# Patient Record
Sex: FEMALE | Race: OTHER | Employment: FULL TIME | URBAN - METROPOLITAN AREA
[De-identification: names, ages, dates, MRNs, and addresses within clinical notes are randomized per-mention and may not be internally consistent; named-entity substitution may affect disease eponyms.]

---

## 2017-02-03 RX ORDER — PAROXETINE 10 MG/1
TABLET, FILM COATED ORAL
Qty: 90 TABLET | Refills: 1 | Status: SHIPPED | OUTPATIENT
Start: 2017-02-03 | End: 2017-02-06 | Stop reason: SDUPTHER

## 2017-02-03 RX ORDER — TRAZODONE HYDROCHLORIDE 50 MG/1
TABLET ORAL
Qty: 90 TABLET | Refills: 1 | Status: SHIPPED | OUTPATIENT
Start: 2017-02-03 | End: 2017-02-06 | Stop reason: SDUPTHER

## 2017-02-06 ENCOUNTER — TELEPHONE (OUTPATIENT)
Dept: FAMILY MEDICINE CLINIC | Age: 52
End: 2017-02-06

## 2017-02-06 RX ORDER — TRAZODONE HYDROCHLORIDE 50 MG/1
TABLET ORAL
Qty: 90 TABLET | Refills: 1 | Status: SHIPPED | OUTPATIENT
Start: 2017-02-06 | End: 2017-05-04 | Stop reason: SDUPTHER

## 2017-02-06 RX ORDER — PAROXETINE 10 MG/1
TABLET, FILM COATED ORAL
Qty: 90 TABLET | Refills: 1 | Status: SHIPPED | OUTPATIENT
Start: 2017-02-06 | End: 2017-04-25 | Stop reason: ALTCHOICE

## 2017-03-13 ENCOUNTER — OFFICE VISIT (OUTPATIENT)
Dept: FAMILY MEDICINE CLINIC | Age: 52
End: 2017-03-13

## 2017-03-13 VITALS
RESPIRATION RATE: 16 BRPM | HEIGHT: 59 IN | DIASTOLIC BLOOD PRESSURE: 64 MMHG | HEART RATE: 61 BPM | WEIGHT: 140.2 LBS | BODY MASS INDEX: 28.26 KG/M2 | TEMPERATURE: 97.5 F | OXYGEN SATURATION: 98 % | SYSTOLIC BLOOD PRESSURE: 112 MMHG

## 2017-03-13 DIAGNOSIS — E04.9 GOITER: ICD-10-CM

## 2017-03-13 DIAGNOSIS — R53.83 FATIGUE, UNSPECIFIED TYPE: ICD-10-CM

## 2017-03-13 DIAGNOSIS — R53.83 FATIGUE, UNSPECIFIED TYPE: Primary | ICD-10-CM

## 2017-03-13 DIAGNOSIS — R06.02 SOB (SHORTNESS OF BREATH) ON EXERTION: ICD-10-CM

## 2017-03-13 LAB
ALBUMIN SERPL-MCNC: 4.2 G/DL (ref 3.9–4.9)
ALP BLD-CCNC: 74 U/L (ref 40–130)
ALT SERPL-CCNC: 13 U/L (ref 0–33)
ANION GAP SERPL CALCULATED.3IONS-SCNC: 9 MEQ/L (ref 7–13)
AST SERPL-CCNC: 16 U/L (ref 0–35)
ATYPICAL LYMPHOCYTE RELATIVE PERCENT: 9 %
BANDED NEUTROPHILS RELATIVE PERCENT: 3 % (ref 5–11)
BASOPHILS ABSOLUTE: 0 K/UL (ref 0–0.2)
BASOPHILS RELATIVE PERCENT: 0 %
BILIRUB SERPL-MCNC: 0.2 MG/DL (ref 0–1.2)
BUN BLDV-MCNC: 11 MG/DL (ref 6–20)
CALCIUM SERPL-MCNC: 9.3 MG/DL (ref 8.6–10.2)
CHLORIDE BLD-SCNC: 102 MEQ/L (ref 98–107)
CO2: 27 MEQ/L (ref 22–29)
CREAT SERPL-MCNC: 0.47 MG/DL (ref 0.5–0.9)
EOSINOPHILS ABSOLUTE: 0.1 K/UL (ref 0–0.7)
EOSINOPHILS RELATIVE PERCENT: 1 %
FERRITIN: 76.8 NG/ML (ref 13–150)
FOLATE: >20 NG/ML (ref 7.3–26.1)
GFR AFRICAN AMERICAN: >60
GFR NON-AFRICAN AMERICAN: >60
GLOBULIN: 2.1 G/DL (ref 2.3–3.5)
GLUCOSE BLD-MCNC: 80 MG/DL (ref 74–109)
HCT VFR BLD CALC: 40.7 % (ref 37–47)
HEMOGLOBIN: 13.4 G/DL (ref 12–16)
IRON SATURATION: 41 % (ref 11–46)
IRON: 114 UG/DL (ref 37–145)
LYMPHOCYTES ABSOLUTE: 1.8 K/UL (ref 1–4.8)
LYMPHOCYTES RELATIVE PERCENT: 13 %
MCH RBC QN AUTO: 29.3 PG (ref 27–31.3)
MCHC RBC AUTO-ENTMCNC: 32.8 % (ref 33–37)
MCV RBC AUTO: 89.2 FL (ref 82–100)
MONOCYTES ABSOLUTE: 0.8 K/UL (ref 0.2–0.8)
MONOCYTES RELATIVE PERCENT: 9 %
NEUTROPHILS ABSOLUTE: 5.7 K/UL (ref 1.4–6.5)
NEUTROPHILS RELATIVE PERCENT: 65 %
PDW BLD-RTO: 13.3 % (ref 11.5–14.5)
PLATELET # BLD: 185 K/UL (ref 130–400)
PLATELET SLIDE REVIEW: NORMAL
POTASSIUM SERPL-SCNC: 3.9 MEQ/L (ref 3.5–5.1)
RBC # BLD: 4.56 M/UL (ref 4.2–5.4)
RBC # BLD: NORMAL 10*6/UL
SMUDGE CELLS: 2.8
SODIUM BLD-SCNC: 138 MEQ/L (ref 132–144)
T3 FREE: 2.6 PG/ML (ref 2–4.4)
T4 FREE: 1.31 NG/DL (ref 0.93–1.7)
TOTAL IRON BINDING CAPACITY: 280 UG/DL (ref 178–450)
TOTAL PROTEIN: 6.3 G/DL (ref 6.4–8.1)
TSH SERPL DL<=0.05 MIU/L-ACNC: 1.81 UIU/ML (ref 0.27–4.2)
VITAMIN B-12: 462 PG/ML (ref 211–946)
WBC # BLD: 8.4 K/UL (ref 4.8–10.8)

## 2017-03-13 PROCEDURE — 99213 OFFICE O/P EST LOW 20 MIN: CPT | Performed by: NURSE PRACTITIONER

## 2017-03-13 ASSESSMENT — ENCOUNTER SYMPTOMS
SWOLLEN GLANDS: 0
COUGH: 0
ABDOMINAL PAIN: 0
VISUAL CHANGE: 0
NAUSEA: 0
CHANGE IN BOWEL HABIT: 0
VOMITING: 0
SORE THROAT: 0

## 2017-03-20 ENCOUNTER — HOSPITAL ENCOUNTER (OUTPATIENT)
Dept: ULTRASOUND IMAGING | Age: 52
Discharge: HOME | End: 2017-03-20
Payer: COMMERCIAL

## 2017-03-20 DIAGNOSIS — E04.9 GOITER: ICD-10-CM

## 2017-03-20 PROCEDURE — 76536 US EXAM OF HEAD AND NECK: CPT

## 2017-03-22 ENCOUNTER — TELEPHONE (OUTPATIENT)
Dept: FAMILY MEDICINE CLINIC | Age: 52
End: 2017-03-22

## 2017-03-29 ENCOUNTER — OFFICE VISIT (OUTPATIENT)
Dept: FAMILY MEDICINE CLINIC | Age: 52
End: 2017-03-29

## 2017-03-29 VITALS
WEIGHT: 138 LBS | BODY MASS INDEX: 27.82 KG/M2 | HEIGHT: 59 IN | SYSTOLIC BLOOD PRESSURE: 122 MMHG | HEART RATE: 78 BPM | TEMPERATURE: 97.6 F | DIASTOLIC BLOOD PRESSURE: 70 MMHG | RESPIRATION RATE: 16 BRPM

## 2017-03-29 DIAGNOSIS — R59.0 LYMPHADENOPATHY OF HEAD AND NECK REGION: ICD-10-CM

## 2017-03-29 DIAGNOSIS — R06.02 SOB (SHORTNESS OF BREATH) ON EXERTION: Primary | ICD-10-CM

## 2017-03-29 DIAGNOSIS — R53.83 OTHER FATIGUE: ICD-10-CM

## 2017-03-29 LAB — HETEROPHILE ANTIBODIES: NORMAL

## 2017-03-29 PROCEDURE — 99213 OFFICE O/P EST LOW 20 MIN: CPT | Performed by: NURSE PRACTITIONER

## 2017-03-29 PROCEDURE — 86308 HETEROPHILE ANTIBODY SCREEN: CPT | Performed by: NURSE PRACTITIONER

## 2017-03-29 RX ORDER — AZITHROMYCIN 250 MG/1
TABLET, FILM COATED ORAL
Qty: 1 PACKET | Refills: 0 | Status: SHIPPED | OUTPATIENT
Start: 2017-03-29 | End: 2017-04-08

## 2017-03-29 RX ORDER — METHYLPREDNISOLONE 4 MG/1
TABLET ORAL
Qty: 1 KIT | Refills: 0 | Status: SHIPPED | OUTPATIENT
Start: 2017-03-29 | End: 2017-04-04

## 2017-03-29 ASSESSMENT — PATIENT HEALTH QUESTIONNAIRE - PHQ9
1. LITTLE INTEREST OR PLEASURE IN DOING THINGS: 1
SUM OF ALL RESPONSES TO PHQ9 QUESTIONS 1 & 2: 1
SUM OF ALL RESPONSES TO PHQ QUESTIONS 1-9: 1
2. FEELING DOWN, DEPRESSED OR HOPELESS: 0

## 2017-03-29 ASSESSMENT — ENCOUNTER SYMPTOMS
SWOLLEN GLANDS: 0
VISUAL CHANGE: 0
COUGH: 0
VOMITING: 0
SORE THROAT: 0
NAUSEA: 0
ABDOMINAL PAIN: 0
CHANGE IN BOWEL HABIT: 0

## 2017-03-31 ENCOUNTER — TELEPHONE (OUTPATIENT)
Dept: FAMILY MEDICINE CLINIC | Age: 52
End: 2017-03-31

## 2017-03-31 DIAGNOSIS — R06.02 SOB (SHORTNESS OF BREATH) ON EXERTION: Primary | ICD-10-CM

## 2017-04-06 ENCOUNTER — HOSPITAL ENCOUNTER (OUTPATIENT)
Dept: CT IMAGING | Age: 52
Discharge: HOME | End: 2017-04-06
Payer: COMMERCIAL

## 2017-04-06 VITALS
HEART RATE: 64 BPM | DIASTOLIC BLOOD PRESSURE: 76 MMHG | WEIGHT: 138 LBS | SYSTOLIC BLOOD PRESSURE: 117 MMHG | HEIGHT: 59 IN | RESPIRATION RATE: 16 BRPM | BODY MASS INDEX: 27.82 KG/M2

## 2017-04-06 DIAGNOSIS — R59.0 LYMPHADENOPATHY OF HEAD AND NECK REGION: ICD-10-CM

## 2017-04-06 PROCEDURE — 70491 CT SOFT TISSUE NECK W/DYE: CPT

## 2017-04-06 PROCEDURE — 70470 CT HEAD/BRAIN W/O & W/DYE: CPT

## 2017-04-06 PROCEDURE — 6360000004 HC RX CONTRAST MEDICATION: Performed by: RADIOLOGY

## 2017-04-06 RX ORDER — SODIUM CHLORIDE 0.9 % (FLUSH) 0.9 %
10 SYRINGE (ML) INJECTION
Status: DISPENSED | OUTPATIENT
Start: 2017-04-06 | End: 2017-04-06

## 2017-04-06 RX ADMIN — IOPAMIDOL 75 ML: 755 INJECTION, SOLUTION INTRAVENOUS at 09:15

## 2017-04-10 RX ORDER — LEVOTHYROXINE SODIUM 0.05 MG/1
TABLET ORAL
Qty: 90 TABLET | Refills: 1 | Status: SHIPPED | OUTPATIENT
Start: 2017-04-10 | End: 2017-06-30 | Stop reason: SDUPTHER

## 2017-04-24 PROBLEM — R06.02 SOB (SHORTNESS OF BREATH): Status: ACTIVE | Noted: 2017-04-24

## 2017-04-25 ENCOUNTER — OFFICE VISIT (OUTPATIENT)
Dept: CARDIOLOGY | Age: 52
End: 2017-04-25

## 2017-04-25 VITALS
BODY MASS INDEX: 27.27 KG/M2 | HEART RATE: 68 BPM | OXYGEN SATURATION: 97 % | RESPIRATION RATE: 14 BRPM | WEIGHT: 135 LBS | SYSTOLIC BLOOD PRESSURE: 124 MMHG | DIASTOLIC BLOOD PRESSURE: 76 MMHG

## 2017-04-25 DIAGNOSIS — R53.1 WEAKNESS: ICD-10-CM

## 2017-04-25 DIAGNOSIS — R06.02 SOB (SHORTNESS OF BREATH): Primary | ICD-10-CM

## 2017-04-25 DIAGNOSIS — R42 DIZZINESS: ICD-10-CM

## 2017-04-25 DIAGNOSIS — R53.82 CHRONIC FATIGUE: ICD-10-CM

## 2017-04-25 DIAGNOSIS — R42 LIGHTHEADEDNESS: ICD-10-CM

## 2017-04-25 PROCEDURE — 99204 OFFICE O/P NEW MOD 45 MIN: CPT | Performed by: INTERNAL MEDICINE

## 2017-04-25 ASSESSMENT — ENCOUNTER SYMPTOMS
SHORTNESS OF BREATH: 1
CHEST TIGHTNESS: 0

## 2017-04-28 ENCOUNTER — OFFICE VISIT (OUTPATIENT)
Dept: FAMILY MEDICINE CLINIC | Age: 52
End: 2017-04-28

## 2017-04-28 VITALS
DIASTOLIC BLOOD PRESSURE: 70 MMHG | SYSTOLIC BLOOD PRESSURE: 104 MMHG | BODY MASS INDEX: 27.83 KG/M2 | HEART RATE: 63 BPM | RESPIRATION RATE: 15 BRPM | OXYGEN SATURATION: 99 % | TEMPERATURE: 97.9 F | WEIGHT: 137.8 LBS

## 2017-04-28 DIAGNOSIS — R06.09 DOE (DYSPNEA ON EXERTION): ICD-10-CM

## 2017-04-28 DIAGNOSIS — G47.26 SHIFT WORK SLEEP DISORDER: ICD-10-CM

## 2017-04-28 DIAGNOSIS — R53.83 OTHER FATIGUE: Primary | ICD-10-CM

## 2017-04-28 DIAGNOSIS — M79.7 FIBROMYALGIA: ICD-10-CM

## 2017-04-28 PROCEDURE — 99213 OFFICE O/P EST LOW 20 MIN: CPT | Performed by: NURSE PRACTITIONER

## 2017-04-28 RX ORDER — DULOXETIN HYDROCHLORIDE 30 MG/1
30 CAPSULE, DELAYED RELEASE ORAL NIGHTLY
Qty: 30 CAPSULE | Refills: 3 | Status: SHIPPED | OUTPATIENT
Start: 2017-04-28 | End: 2017-06-08 | Stop reason: SDUPTHER

## 2017-04-28 RX ORDER — MODAFINIL 100 MG/1
100 TABLET ORAL DAILY
Qty: 30 TABLET | Refills: 2 | Status: SHIPPED | OUTPATIENT
Start: 2017-04-28 | End: 2017-07-10

## 2017-04-28 ASSESSMENT — ENCOUNTER SYMPTOMS
CHANGE IN BOWEL HABIT: 0
COUGH: 0
VISUAL CHANGE: 0
VOMITING: 0
SWOLLEN GLANDS: 0
ABDOMINAL PAIN: 0
SORE THROAT: 0
NAUSEA: 0

## 2017-05-05 RX ORDER — TRAZODONE HYDROCHLORIDE 50 MG/1
TABLET ORAL
Qty: 90 TABLET | Refills: 1 | Status: SHIPPED | OUTPATIENT
Start: 2017-05-05 | End: 2017-05-09 | Stop reason: SDUPTHER

## 2017-05-09 ENCOUNTER — TELEPHONE (OUTPATIENT)
Dept: FAMILY MEDICINE CLINIC | Age: 52
End: 2017-05-09

## 2017-05-09 RX ORDER — TRAZODONE HYDROCHLORIDE 150 MG/1
TABLET ORAL
Qty: 30 TABLET | Refills: 2 | Status: SHIPPED | OUTPATIENT
Start: 2017-05-09 | End: 2017-06-08

## 2017-05-23 ENCOUNTER — HOSPITAL ENCOUNTER (OUTPATIENT)
Dept: NON INVASIVE DIAGNOSTICS | Age: 52
Discharge: HOME | End: 2017-05-23
Payer: COMMERCIAL

## 2017-05-23 LAB
LV EF: 62 %
LVEF MODALITY: NORMAL

## 2017-05-23 PROCEDURE — 93017 CV STRESS TEST TRACING ONLY: CPT

## 2017-05-23 PROCEDURE — 93306 TTE W/DOPPLER COMPLETE: CPT

## 2017-05-30 ENCOUNTER — OFFICE VISIT (OUTPATIENT)
Dept: CARDIOLOGY | Age: 52
End: 2017-05-30

## 2017-05-30 VITALS
HEIGHT: 59 IN | OXYGEN SATURATION: 94 % | SYSTOLIC BLOOD PRESSURE: 124 MMHG | WEIGHT: 138 LBS | DIASTOLIC BLOOD PRESSURE: 82 MMHG | HEART RATE: 88 BPM | RESPIRATION RATE: 12 BRPM | BODY MASS INDEX: 27.82 KG/M2

## 2017-05-30 DIAGNOSIS — R53.82 CHRONIC FATIGUE: Primary | ICD-10-CM

## 2017-05-30 DIAGNOSIS — R42 DIZZINESS: ICD-10-CM

## 2017-05-30 DIAGNOSIS — R06.02 SOB (SHORTNESS OF BREATH): ICD-10-CM

## 2017-05-30 PROCEDURE — 99213 OFFICE O/P EST LOW 20 MIN: CPT | Performed by: INTERNAL MEDICINE

## 2017-05-30 ASSESSMENT — ENCOUNTER SYMPTOMS
SHORTNESS OF BREATH: 1
COLOR CHANGE: 0
CHEST TIGHTNESS: 0
COUGH: 0
APNEA: 0

## 2017-06-08 ENCOUNTER — OFFICE VISIT (OUTPATIENT)
Dept: FAMILY MEDICINE CLINIC | Age: 52
End: 2017-06-08

## 2017-06-08 VITALS
HEART RATE: 77 BPM | RESPIRATION RATE: 18 BRPM | SYSTOLIC BLOOD PRESSURE: 124 MMHG | BODY MASS INDEX: 27.62 KG/M2 | DIASTOLIC BLOOD PRESSURE: 78 MMHG | OXYGEN SATURATION: 98 % | WEIGHT: 137 LBS | HEIGHT: 59 IN | TEMPERATURE: 98 F

## 2017-06-08 DIAGNOSIS — R53.83 FATIGUE, UNSPECIFIED TYPE: Primary | ICD-10-CM

## 2017-06-08 DIAGNOSIS — M79.7 FIBROMYALGIA: ICD-10-CM

## 2017-06-08 DIAGNOSIS — R53.83 FATIGUE, UNSPECIFIED TYPE: ICD-10-CM

## 2017-06-08 LAB
BILIRUBIN, POC: NORMAL
BLOOD URINE, POC: NORMAL
CLARITY, POC: NORMAL
COLOR, POC: NORMAL
GLUCOSE URINE, POC: NORMAL
KETONES, POC: NORMAL
LEUKOCYTE EST, POC: NORMAL
NITRITE, POC: NORMAL
PH, POC: 7
PROTEIN, POC: NORMAL
SPECIFIC GRAVITY, POC: 1.01
UROBILINOGEN, POC: 3.5

## 2017-06-08 PROCEDURE — 81003 URINALYSIS AUTO W/O SCOPE: CPT | Performed by: NURSE PRACTITIONER

## 2017-06-08 PROCEDURE — 99213 OFFICE O/P EST LOW 20 MIN: CPT | Performed by: NURSE PRACTITIONER

## 2017-06-08 PROCEDURE — 96372 THER/PROPH/DIAG INJ SC/IM: CPT | Performed by: NURSE PRACTITIONER

## 2017-06-08 RX ORDER — ALPRAZOLAM 0.5 MG/1
0.5 TABLET ORAL NIGHTLY PRN
Qty: 30 TABLET | Refills: 1 | Status: SHIPPED | OUTPATIENT
Start: 2017-06-08 | End: 2017-07-02

## 2017-06-08 RX ORDER — CYANOCOBALAMIN 1000 UG/ML
1000 INJECTION INTRAMUSCULAR; SUBCUTANEOUS ONCE
Status: COMPLETED | OUTPATIENT
Start: 2017-06-08 | End: 2017-06-08

## 2017-06-08 RX ORDER — SERTRALINE HYDROCHLORIDE 100 MG/1
100 TABLET, FILM COATED ORAL DAILY
Qty: 30 TABLET | Refills: 1 | Status: SHIPPED | OUTPATIENT
Start: 2017-06-08 | End: 2017-07-10 | Stop reason: SDUPTHER

## 2017-06-08 RX ORDER — DULOXETIN HYDROCHLORIDE 60 MG/1
60 CAPSULE, DELAYED RELEASE ORAL NIGHTLY
Qty: 30 CAPSULE | Refills: 1 | Status: SHIPPED | OUTPATIENT
Start: 2017-06-08 | End: 2017-07-10 | Stop reason: SDUPTHER

## 2017-06-08 RX ADMIN — CYANOCOBALAMIN 1000 MCG: 1000 INJECTION INTRAMUSCULAR; SUBCUTANEOUS at 10:39

## 2017-06-10 LAB — URINE CULTURE, ROUTINE: NORMAL

## 2017-06-20 ASSESSMENT — ENCOUNTER SYMPTOMS
CHANGE IN BOWEL HABIT: 0
VISUAL CHANGE: 0
ABDOMINAL PAIN: 0
VOMITING: 0
SWOLLEN GLANDS: 0
SORE THROAT: 0
COUGH: 0
NAUSEA: 0

## 2017-06-21 ENCOUNTER — HOSPITAL ENCOUNTER (OUTPATIENT)
Dept: SLEEP CENTER | Age: 52
Discharge: HOME | End: 2017-06-21
Payer: COMMERCIAL

## 2017-06-21 PROCEDURE — 95810 POLYSOM 6/> YRS 4/> PARAM: CPT

## 2017-06-30 ENCOUNTER — TELEPHONE (OUTPATIENT)
Dept: FAMILY MEDICINE CLINIC | Age: 52
End: 2017-06-30

## 2017-07-02 RX ORDER — DOXEPIN HYDROCHLORIDE 10 MG/1
10-20 CAPSULE ORAL NIGHTLY
Qty: 60 CAPSULE | Refills: 3 | Status: SHIPPED | OUTPATIENT
Start: 2017-07-02 | End: 2017-07-10

## 2017-07-02 RX ORDER — LEVOTHYROXINE SODIUM 0.05 MG/1
TABLET ORAL
Qty: 90 TABLET | Refills: 1 | Status: SHIPPED | OUTPATIENT
Start: 2017-07-02 | End: 2017-07-06 | Stop reason: SDUPTHER

## 2017-07-07 RX ORDER — LEVOTHYROXINE SODIUM 0.05 MG/1
TABLET ORAL
Qty: 90 TABLET | Refills: 0 | Status: SHIPPED | OUTPATIENT
Start: 2017-07-07 | End: 2017-07-10 | Stop reason: SDUPTHER

## 2017-07-10 ENCOUNTER — OFFICE VISIT (OUTPATIENT)
Dept: FAMILY MEDICINE CLINIC | Age: 52
End: 2017-07-10

## 2017-07-10 VITALS
TEMPERATURE: 97.5 F | RESPIRATION RATE: 15 BRPM | DIASTOLIC BLOOD PRESSURE: 82 MMHG | HEART RATE: 97 BPM | BODY MASS INDEX: 26.86 KG/M2 | SYSTOLIC BLOOD PRESSURE: 110 MMHG | OXYGEN SATURATION: 98 % | WEIGHT: 133 LBS

## 2017-07-10 DIAGNOSIS — G47.26 SHIFT WORK SLEEP DISORDER: ICD-10-CM

## 2017-07-10 DIAGNOSIS — Z76.0 MEDICATION REFILL: ICD-10-CM

## 2017-07-10 DIAGNOSIS — R53.83 FATIGUE, UNSPECIFIED TYPE: Primary | ICD-10-CM

## 2017-07-10 DIAGNOSIS — M79.7 FIBROMYALGIA: ICD-10-CM

## 2017-07-10 DIAGNOSIS — E03.9 ACQUIRED HYPOTHYROIDISM: ICD-10-CM

## 2017-07-10 PROCEDURE — 99213 OFFICE O/P EST LOW 20 MIN: CPT | Performed by: NURSE PRACTITIONER

## 2017-07-10 RX ORDER — LEVOTHYROXINE SODIUM 0.05 MG/1
TABLET ORAL
Qty: 90 TABLET | Refills: 2 | Status: SHIPPED | OUTPATIENT
Start: 2017-07-10 | End: 2017-10-04 | Stop reason: SDUPTHER

## 2017-07-10 RX ORDER — SERTRALINE HYDROCHLORIDE 100 MG/1
100 TABLET, FILM COATED ORAL DAILY
Qty: 90 TABLET | Refills: 1 | Status: SHIPPED | OUTPATIENT
Start: 2017-07-10 | End: 2017-08-17 | Stop reason: SDUPTHER

## 2017-07-10 RX ORDER — CLONIDINE HYDROCHLORIDE 0.1 MG/1
0.1 TABLET ORAL NIGHTLY
Qty: 90 TABLET | Refills: 1 | Status: SHIPPED | OUTPATIENT
Start: 2017-07-10 | End: 2017-09-11

## 2017-07-10 RX ORDER — DULOXETIN HYDROCHLORIDE 60 MG/1
60 CAPSULE, DELAYED RELEASE ORAL NIGHTLY
Qty: 90 CAPSULE | Refills: 1 | Status: SHIPPED | OUTPATIENT
Start: 2017-07-10 | End: 2017-08-17 | Stop reason: SDUPTHER

## 2017-07-10 RX ORDER — OMEPRAZOLE 20 MG/1
CAPSULE, DELAYED RELEASE ORAL
Qty: 90 CAPSULE | Refills: 1 | Status: SHIPPED | OUTPATIENT
Start: 2017-07-10 | End: 2018-03-12

## 2017-07-10 RX ORDER — LIOTHYRONINE SODIUM 5 UG/1
5 TABLET ORAL DAILY
Qty: 90 TABLET | Refills: 1 | Status: SHIPPED | OUTPATIENT
Start: 2017-07-10 | End: 2017-09-11 | Stop reason: SDUPTHER

## 2017-07-10 ASSESSMENT — ENCOUNTER SYMPTOMS
VOMITING: 0
NAUSEA: 0
ABDOMINAL PAIN: 0
COUGH: 0
SWOLLEN GLANDS: 0
SORE THROAT: 0
CHANGE IN BOWEL HABIT: 0
VISUAL CHANGE: 0

## 2017-08-03 RX ORDER — ALPRAZOLAM 0.5 MG/1
TABLET ORAL
Qty: 30 TABLET | Refills: 0 | Status: SHIPPED | OUTPATIENT
Start: 2017-08-03 | End: 2017-08-07 | Stop reason: SDUPTHER

## 2017-08-07 RX ORDER — ALPRAZOLAM 1 MG/1
TABLET ORAL
Qty: 30 TABLET | Refills: 1 | Status: SHIPPED | OUTPATIENT
Start: 2017-08-07 | End: 2017-09-06 | Stop reason: SDUPTHER

## 2017-08-17 DIAGNOSIS — Z76.0 MEDICATION REFILL: ICD-10-CM

## 2017-08-17 DIAGNOSIS — M79.7 FIBROMYALGIA: ICD-10-CM

## 2017-08-17 RX ORDER — DULOXETIN HYDROCHLORIDE 60 MG/1
60 CAPSULE, DELAYED RELEASE ORAL NIGHTLY
Qty: 30 CAPSULE | Refills: 0 | Status: SHIPPED | OUTPATIENT
Start: 2017-08-17 | End: 2018-03-12

## 2017-08-17 RX ORDER — SERTRALINE HYDROCHLORIDE 100 MG/1
100 TABLET, FILM COATED ORAL DAILY
Qty: 30 TABLET | Refills: 0 | Status: SHIPPED | OUTPATIENT
Start: 2017-08-17 | End: 2018-03-12

## 2017-08-22 DIAGNOSIS — E03.9 ACQUIRED HYPOTHYROIDISM: ICD-10-CM

## 2017-08-22 LAB
T3 TOTAL: 0.97 NG/ML (ref 0.8–2)
T4 TOTAL: 6 UG/DL (ref 4.5–11.7)
TSH SERPL DL<=0.05 MIU/L-ACNC: 0.92 UIU/ML (ref 0.27–4.2)

## 2017-09-07 RX ORDER — ALPRAZOLAM 1 MG/1
TABLET ORAL
Qty: 30 TABLET | Refills: 1 | Status: SHIPPED | OUTPATIENT
Start: 2017-09-07 | End: 2017-10-04 | Stop reason: SDUPTHER

## 2017-09-11 ENCOUNTER — OFFICE VISIT (OUTPATIENT)
Dept: FAMILY MEDICINE CLINIC | Age: 52
End: 2017-09-11

## 2017-09-11 VITALS
HEIGHT: 59 IN | OXYGEN SATURATION: 98 % | HEART RATE: 87 BPM | SYSTOLIC BLOOD PRESSURE: 108 MMHG | RESPIRATION RATE: 16 BRPM | DIASTOLIC BLOOD PRESSURE: 80 MMHG | WEIGHT: 125 LBS | TEMPERATURE: 97.8 F | BODY MASS INDEX: 25.2 KG/M2

## 2017-09-11 DIAGNOSIS — E03.9 ACQUIRED HYPOTHYROIDISM: ICD-10-CM

## 2017-09-11 DIAGNOSIS — G47.9 SLEEP DIFFICULTIES: ICD-10-CM

## 2017-09-11 DIAGNOSIS — R53.83 FATIGUE, UNSPECIFIED TYPE: ICD-10-CM

## 2017-09-11 DIAGNOSIS — M79.7 FIBROMYALGIA: Primary | ICD-10-CM

## 2017-09-11 PROCEDURE — 99214 OFFICE O/P EST MOD 30 MIN: CPT | Performed by: NURSE PRACTITIONER

## 2017-09-11 RX ORDER — LIOTHYRONINE SODIUM 5 UG/1
5 TABLET ORAL DAILY
Qty: 30 TABLET | Refills: 2 | Status: SHIPPED | OUTPATIENT
Start: 2017-09-11 | End: 2018-01-02 | Stop reason: SDUPTHER

## 2017-09-11 RX ORDER — MIRTAZAPINE 15 MG/1
15 TABLET, FILM COATED ORAL NIGHTLY
Qty: 30 TABLET | Refills: 3 | Status: SHIPPED | OUTPATIENT
Start: 2017-09-11 | End: 2018-03-12

## 2017-09-11 ASSESSMENT — ENCOUNTER SYMPTOMS
NAUSEA: 0
COUGH: 0
SWOLLEN GLANDS: 0
VOMITING: 0
ABDOMINAL PAIN: 0
SORE THROAT: 0
VISUAL CHANGE: 0
CHANGE IN BOWEL HABIT: 0

## 2017-10-04 DIAGNOSIS — E03.9 ACQUIRED HYPOTHYROIDISM: ICD-10-CM

## 2017-10-04 DIAGNOSIS — M79.7 FIBROMYALGIA: ICD-10-CM

## 2017-10-04 RX ORDER — LEVOTHYROXINE SODIUM 0.05 MG/1
TABLET ORAL
Qty: 90 TABLET | Refills: 2 | Status: SHIPPED | OUTPATIENT
Start: 2017-10-04 | End: 2018-01-05

## 2017-10-04 RX ORDER — ALPRAZOLAM 1 MG/1
TABLET ORAL
Qty: 30 TABLET | Refills: 1 | Status: SHIPPED | OUTPATIENT
Start: 2017-10-04 | End: 2017-12-11 | Stop reason: SDUPTHER

## 2017-10-27 LAB
ALBUMIN SERPL-MCNC: 4.1 G/DL (ref 3.9–4.9)
ALP BLD-CCNC: 91 U/L (ref 40–130)
ALT SERPL-CCNC: 16 U/L (ref 0–33)
ANION GAP SERPL CALCULATED.3IONS-SCNC: 11 MEQ/L (ref 7–13)
AST SERPL-CCNC: 18 U/L (ref 0–35)
BILIRUB SERPL-MCNC: 0.2 MG/DL (ref 0–1.2)
BUN BLDV-MCNC: 17 MG/DL (ref 6–20)
CALCIUM SERPL-MCNC: 9.4 MG/DL (ref 8.6–10.2)
CHLORIDE BLD-SCNC: 102 MEQ/L (ref 98–107)
CO2: 28 MEQ/L (ref 22–29)
CREAT SERPL-MCNC: 0.51 MG/DL (ref 0.5–0.9)
FOLATE: >20 NG/ML (ref 7.3–26.1)
GFR AFRICAN AMERICAN: >60
GFR NON-AFRICAN AMERICAN: >60
GLOBULIN: 2.7 G/DL (ref 2.3–3.5)
GLUCOSE BLD-MCNC: 86 MG/DL (ref 74–109)
HBA1C MFR BLD: 5.3 % (ref 4.8–5.9)
HCT VFR BLD CALC: 39.8 % (ref 37–47)
HEMOGLOBIN: 12.9 G/DL (ref 12–16)
MCH RBC QN AUTO: 28.3 PG (ref 27–31.3)
MCHC RBC AUTO-ENTMCNC: 32.3 % (ref 33–37)
MCV RBC AUTO: 87.7 FL (ref 82–100)
PDW BLD-RTO: 13.8 % (ref 11.5–14.5)
PLATELET # BLD: 226 K/UL (ref 130–400)
POTASSIUM SERPL-SCNC: 4.9 MEQ/L (ref 3.5–5.1)
RBC # BLD: 4.54 M/UL (ref 4.2–5.4)
SODIUM BLD-SCNC: 141 MEQ/L (ref 132–144)
TOTAL CK: 61 U/L (ref 0–170)
TOTAL PROTEIN: 6.8 G/DL (ref 6.4–8.1)
TSH SERPL DL<=0.05 MIU/L-ACNC: 1.11 UIU/ML (ref 0.27–4.2)
VITAMIN B-12: 454 PG/ML (ref 211–946)
VITAMIN D 25-HYDROXY: 38.6 NG/ML (ref 30–100)
WBC # BLD: 8 K/UL (ref 4.8–10.8)

## 2017-10-30 LAB
ANA INTERPRETATION: ABNORMAL
ANA PATTERN: ABNORMAL
ANA TITER: ABNORMAL
ANTI-NUCLEAR ANTIBODY (ANA): POSITIVE

## 2017-12-11 ENCOUNTER — OFFICE VISIT (OUTPATIENT)
Dept: FAMILY MEDICINE CLINIC | Age: 52
End: 2017-12-11

## 2017-12-11 VITALS
DIASTOLIC BLOOD PRESSURE: 78 MMHG | OXYGEN SATURATION: 99 % | HEIGHT: 59 IN | TEMPERATURE: 97.7 F | WEIGHT: 135 LBS | RESPIRATION RATE: 16 BRPM | SYSTOLIC BLOOD PRESSURE: 120 MMHG | HEART RATE: 74 BPM | BODY MASS INDEX: 27.21 KG/M2

## 2017-12-11 DIAGNOSIS — R53.83 FATIGUE, UNSPECIFIED TYPE: ICD-10-CM

## 2017-12-11 DIAGNOSIS — E03.9 ACQUIRED HYPOTHYROIDISM: ICD-10-CM

## 2017-12-11 DIAGNOSIS — G25.81 RESTLESS LEGS SYNDROME (RLS): ICD-10-CM

## 2017-12-11 DIAGNOSIS — M79.7 FIBROMYALGIA: Primary | ICD-10-CM

## 2017-12-11 DIAGNOSIS — G47.9 SLEEP DIFFICULTIES: ICD-10-CM

## 2017-12-11 LAB
ALBUMIN SERPL-MCNC: 4.1 G/DL (ref 3.9–4.9)
ALP BLD-CCNC: 98 U/L (ref 40–130)
ALT SERPL-CCNC: 16 U/L (ref 0–33)
ANION GAP SERPL CALCULATED.3IONS-SCNC: 14 MEQ/L (ref 9–17)
AST SERPL-CCNC: 18 U/L (ref 0–35)
BILIRUB SERPL-MCNC: 0.2 MG/DL (ref 0–1.2)
BUN BLDV-MCNC: 13 MG/DL (ref 6–20)
CALCIUM SERPL-MCNC: 9.7 MG/DL (ref 8.6–10.2)
CHLORIDE BLD-SCNC: 101 MEQ/L (ref 97–107)
CHOLESTEROL, TOTAL: 224 MG/DL (ref 0–199)
CO2: 26 MEQ/L (ref 17–24)
CREAT SERPL-MCNC: 0.5 MG/DL (ref 0.5–0.9)
GFR AFRICAN AMERICAN: >60
GFR NON-AFRICAN AMERICAN: >60
GLOBULIN: 2.5 G/DL (ref 2.3–3.5)
GLUCOSE BLD-MCNC: 83 MG/DL (ref 74–109)
HDLC SERPL-MCNC: 63 MG/DL (ref 40–59)
LDL CHOLESTEROL CALCULATED: 134 MG/DL (ref 0–129)
POTASSIUM SERPL-SCNC: 4.8 MEQ/L (ref 3.2–4.4)
SODIUM BLD-SCNC: 141 MEQ/L (ref 132–138)
T3 TOTAL: 1.43 NG/ML (ref 0.8–2)
T4 TOTAL: 7.7 UG/DL (ref 4.5–11.7)
TOTAL PROTEIN: 6.6 G/DL (ref 6.4–8.1)
TRIGL SERPL-MCNC: 136 MG/DL (ref 0–200)
TSH SERPL DL<=0.05 MIU/L-ACNC: 0.32 UIU/ML (ref 0.27–4.2)

## 2017-12-11 PROCEDURE — 99214 OFFICE O/P EST MOD 30 MIN: CPT | Performed by: NURSE PRACTITIONER

## 2017-12-11 RX ORDER — ALPRAZOLAM 1 MG/1
TABLET ORAL
Qty: 30 TABLET | Refills: 2 | Status: SHIPPED | OUTPATIENT
Start: 2017-12-11 | End: 2018-03-12 | Stop reason: SDUPTHER

## 2017-12-11 RX ORDER — DOXEPIN HYDROCHLORIDE 10 MG/1
CAPSULE ORAL
Refills: 2 | COMMUNITY
Start: 2017-11-20 | End: 2018-03-12

## 2017-12-11 RX ORDER — MELOXICAM 15 MG/1
TABLET ORAL
Refills: 5 | COMMUNITY
Start: 2017-11-22 | End: 2018-03-12

## 2017-12-15 LAB — RHEUMATOID FACTOR: <10 IU/ML (ref 0–14)

## 2017-12-23 ASSESSMENT — ENCOUNTER SYMPTOMS
GLOBUS SENSATION: 0
WATER BRASH: 0
WHEEZING: 0
COUGH: 0
CHANGE IN BOWEL HABIT: 0
STRIDOR: 0
BELCHING: 1
ABDOMINAL PAIN: 0
VISUAL CHANGE: 0
CHOKING: 0
NAUSEA: 0
HEARTBURN: 1
VOMITING: 0
SWOLLEN GLANDS: 0
SORE THROAT: 0
HOARSE VOICE: 0

## 2017-12-23 NOTE — PROGRESS NOTES
Subjective  Rina Santo, 50 y.o. female presents today with:  Chief Complaint   Patient presents with    3 Month Follow-Up     CSM xanax pt also needs a lipis bw done for a paper for work        RLS:  Treating RLS with Gabapentin. Started 4 weeks ago, has had significant improvement with some side effects of tiredness, but describes it as minimal.  Would like to consider current med maybe increase a little fore effect. Hypothyroidism: Patient presents for evaluation of thyroid function. Symptoms consist of denies fatigue, weight changes, heat/cold intolerance, bowel/skin changes or CVS symptoms. The symptoms are none. The problem has been controlled. Previous thyroid studies include TSH. The hypothyroidism is due to hypothyroidism and Hashimoto's disease      Fatigue   This is a new problem. The current episode started more than 1 month ago (2 months). The problem occurs daily. The problem has been gradually improving. Associated symptoms include fatigue. Pertinent negatives include no abdominal pain, anorexia, arthralgias, change in bowel habit, chest pain, chills, congestion, coughing, diaphoresis, fever, headaches, joint swelling, myalgias, nausea, neck pain, numbness, rash, sore throat, swollen glands, urinary symptoms, vertigo, visual change, vomiting or weakness. Associated symptoms comments: Shift worker. . Nothing aggravates the symptoms. She has tried nothing for the symptoms. The treatment provided no relief. Gastroesophageal Reflux   She complains of belching and heartburn. She reports no abdominal pain, no chest pain, no choking, no coughing, no dysphagia, no early satiety, no globus sensation, no hoarse voice, no nausea, no sore throat, no stridor, no tooth decay, no water brash or no wheezing. This is a new (over the past 3 months) problem. The current episode started more than 1 month ago. The problem occurs constantly (worse at night). The problem has been unchanged.  The heartburn duration is several minutes. The heartburn is located in the abdomen and substernum. The heartburn is of moderate intensity. The heartburn wakes her from sleep. The heartburn does not limit her activity. The heartburn changes (worse with laying laying flat ) with position. The symptoms are aggravated by lying down, caffeine and certain foods. Associated symptoms include fatigue. Pertinent negatives include no anemia, melena, muscle weakness, orthopnea or weight loss. There are no known risk factors. She has tried nothing for the symptoms. Past procedures do not include an abdominal ultrasound, an EGD, esophageal manometry, esophageal pH monitoring, H. pylori antibody titer or a UGI. Past invasive treatments do not include gastroplasty, gastroplication or reflux surgery. Review of Systems   Constitutional: Positive for fatigue. Negative for chills, diaphoresis, fever and weight loss. HENT: Negative for congestion, hoarse voice and sore throat. Respiratory: Negative for cough, choking and wheezing. Cardiovascular: Negative for chest pain. Gastrointestinal: Positive for heartburn. Negative for abdominal pain, anorexia, change in bowel habit, dysphagia, melena, nausea and vomiting. Musculoskeletal: Negative for arthralgias, joint swelling, myalgias, muscle weakness and neck pain. Skin: Negative for rash. Neurological: Negative for vertigo, weakness, numbness and headaches. Past Medical History:   Diagnosis Date    Chronic fatigue 4/25/2017    Dizziness 4/25/2017    HTN (hypertension)     Hypothyroidism     Lightheadedness 4/25/2017    SOB (shortness of breath) 4/24/2017    Weakness 4/25/2017     Past Surgical History:   Procedure Laterality Date    PARTIAL HYSTERECTOMY  2009     Social History     Social History    Marital status: Single     Spouse name: N/A    Number of children: N/A    Years of education: N/A     Occupational History    Not on file.      Social History Main nerve deficit or sensory deficit. She exhibits normal muscle tone. She displays a negative Romberg sign. She displays no seizure activity. Coordination and gait normal. GCS eye subscore is 4. GCS verbal subscore is 5. GCS motor subscore is 6. Skin: Skin is warm, dry and intact. She is not diaphoretic. No pallor. Psychiatric: She has a normal mood and affect. Her speech is normal.     Assessment & Plan   Lydia Almanza was seen today for 3 month follow-up. Diagnoses and all orders for this visit:    Fibromyalgia    Acquired hypothyroidism  -     Lipid Panel; Future  -     Comprehensive Metabolic Panel; Future  -     TSH without Reflex; Future  -     T3; Future  -     T4; Future    Fatigue, unspecified type    Restless legs syndrome (RLS)    Sleep difficulties  -     ALPRAZolam (XANAX) 1 MG tablet; TAKE 1 TABLET BY MOUTH EVERY NIGHT AT BEDTIME AS NEEDED FOR SLEEP OR ANXIETY. Orders Placed This Encounter   Procedures    Lipid Panel     Standing Status:   Future     Number of Occurrences:   1     Standing Expiration Date:   12/11/2018     Order Specific Question:   Is Patient Fasting?/# of Hours     Answer:   y    Comprehensive Metabolic Panel     Standing Status:   Future     Number of Occurrences:   1     Standing Expiration Date:   12/11/2018    TSH without Reflex     Standing Status:   Future     Number of Occurrences:   1     Standing Expiration Date:   12/11/2018    T3     Standing Status:   Future     Number of Occurrences:   1     Standing Expiration Date:   12/11/2018    T4     Standing Status:   Future     Number of Occurrences:   1     Standing Expiration Date:   12/11/2018     Orders Placed This Encounter   Medications    ALPRAZolam (XANAX) 1 MG tablet     Sig: TAKE 1 TABLET BY MOUTH EVERY NIGHT AT BEDTIME AS NEEDED FOR SLEEP OR ANXIETY.      Dispense:  30 tablet     Refill:  2     Medications Discontinued During This Encounter   Medication Reason    ALPRAZolam (XANAX) 1 MG tablet Reorder

## 2018-01-02 DIAGNOSIS — E03.9 ACQUIRED HYPOTHYROIDISM: ICD-10-CM

## 2018-01-02 RX ORDER — LIOTHYRONINE SODIUM 5 UG/1
5 TABLET ORAL DAILY
Qty: 90 TABLET | Refills: 0 | Status: SHIPPED | OUTPATIENT
Start: 2018-01-02 | End: 2018-03-12

## 2018-01-05 ENCOUNTER — OFFICE VISIT (OUTPATIENT)
Dept: FAMILY MEDICINE CLINIC | Age: 53
End: 2018-01-05

## 2018-01-05 VITALS
OXYGEN SATURATION: 98 % | WEIGHT: 135 LBS | SYSTOLIC BLOOD PRESSURE: 116 MMHG | HEART RATE: 72 BPM | TEMPERATURE: 98.4 F | DIASTOLIC BLOOD PRESSURE: 78 MMHG | HEIGHT: 59 IN | BODY MASS INDEX: 27.21 KG/M2 | RESPIRATION RATE: 14 BRPM

## 2018-01-05 DIAGNOSIS — R09.89 CHEST CONGESTION: ICD-10-CM

## 2018-01-05 DIAGNOSIS — J10.1 INFLUENZA A: Primary | ICD-10-CM

## 2018-01-05 LAB
INFLUENZA A ANTIBODY: ABNORMAL
INFLUENZA B ANTIBODY: ABNORMAL

## 2018-01-05 PROCEDURE — 87804 INFLUENZA ASSAY W/OPTIC: CPT | Performed by: NURSE PRACTITIONER

## 2018-01-05 PROCEDURE — 99213 OFFICE O/P EST LOW 20 MIN: CPT | Performed by: NURSE PRACTITIONER

## 2018-01-05 RX ORDER — BENZONATATE 200 MG/1
CAPSULE ORAL
COMMUNITY
Start: 2018-01-04 | End: 2018-03-12

## 2018-01-05 RX ORDER — ALBUTEROL SULFATE 90 UG/1
2 AEROSOL, METERED RESPIRATORY (INHALATION)
COMMUNITY
Start: 2018-01-04 | End: 2018-02-04

## 2018-01-05 RX ORDER — LEVOTHYROXINE SODIUM 0.05 MG/1
50 TABLET ORAL
COMMUNITY
End: 2018-01-26 | Stop reason: SDUPTHER

## 2018-01-05 RX ORDER — BENZONATATE 200 MG/1
200 CAPSULE ORAL
COMMUNITY
Start: 2018-01-04 | End: 2018-01-05 | Stop reason: DRUGHIGH

## 2018-01-05 RX ORDER — OSELTAMIVIR PHOSPHATE 75 MG/1
75 CAPSULE ORAL 2 TIMES DAILY
Qty: 10 CAPSULE | Refills: 0 | Status: SHIPPED | OUTPATIENT
Start: 2018-01-05 | End: 2018-01-10

## 2018-01-05 RX ORDER — ALPRAZOLAM 1 MG/1
1 TABLET ORAL
COMMUNITY
End: 2018-03-12 | Stop reason: SDUPTHER

## 2018-01-05 RX ORDER — DOXYCYCLINE HYCLATE 100 MG
100 TABLET ORAL
COMMUNITY
Start: 2018-01-04 | End: 2018-01-05 | Stop reason: DRUGHIGH

## 2018-01-05 RX ORDER — DOXYCYCLINE HYCLATE 100 MG
TABLET ORAL
COMMUNITY
Start: 2018-01-04 | End: 2018-01-05

## 2018-01-05 RX ORDER — DULOXETIN HYDROCHLORIDE 60 MG/1
60 CAPSULE, DELAYED RELEASE ORAL
COMMUNITY
End: 2018-01-05

## 2018-01-11 ASSESSMENT — ENCOUNTER SYMPTOMS
RHINORRHEA: 0
WHEEZING: 0
HEMOPTYSIS: 0
SHORTNESS OF BREATH: 0
SORE THROAT: 1
HEARTBURN: 0
COUGH: 1

## 2018-01-11 NOTE — PROGRESS NOTES
well-developed and well-nourished. She appears ill. No distress. HENT:   Head: Normocephalic and atraumatic. Right Ear: External ear normal.   Left Ear: External ear normal.   Nose: Nose normal.   Mouth/Throat: Oropharynx is clear and moist.   Eyes: Conjunctivae are normal. Pupils are equal, round, and reactive to light. Neck: Neck supple. No JVD present. Cardiovascular: Normal rate, regular rhythm, normal heart sounds and intact distal pulses. No murmur heard. Pulmonary/Chest: Effort normal and breath sounds normal. No respiratory distress. She has no wheezes. She has no rales. Abdominal: Soft. Bowel sounds are normal. She exhibits no distension and no mass. There is no tenderness. Neurological: She is alert and oriented to person, place, and time. Skin: Skin is warm and dry. Results for POC orders placed in visit on 01/05/18   POCT Influenza A/B   Result Value Ref Range    Influenza A Ab pos     Influenza B Ab neg          Assessment & Plan   Nilesh Calderon was seen today for pharyngitis. Diagnoses and all orders for this visit:    Influenza A  -     oseltamivir (TAMIFLU) 75 MG capsule; Take 1 capsule by mouth 2 times daily for 5 days    Chest congestion  -     POCT Influenza A/B      Orders Placed This Encounter   Procedures    POCT Influenza A/B     Orders Placed This Encounter   Medications    oseltamivir (TAMIFLU) 75 MG capsule     Sig: Take 1 capsule by mouth 2 times daily for 5 days     Dispense:  10 capsule     Refill:  0     Medications Discontinued During This Encounter   Medication Reason    PROAIR  (90 Base) MCG/ACT inhaler Dose adjustment    benzonatate (TESSALON) 200 MG capsule Dose adjustment    doxycycline hyclate (VIBRA-TABS) 100 MG tablet Dose adjustment    DULoxetine (CYMBALTA) 60 MG extended release capsule     levothyroxine (SYNTHROID) 50 MCG tablet     doxycycline hyclate (VIBRA-TABS) 100 MG tablet      No Follow-up on file.       Reviewed with the patient: current clinical status, medications, activities and diet. Side effects, adverse effects of the medication prescribed today, as well as treatment plan/ rationale and result expectations have been discussed with the patient who expresses understanding and desires to proceed. Close follow up to evaluate treatment results and for coordination of care. I have reviewed the patient's medical history in detail and updated the computerized patient record.     Bonny Salmon, NP

## 2018-01-29 RX ORDER — LEVOTHYROXINE SODIUM 0.05 MG/1
50 TABLET ORAL DAILY
Qty: 90 TABLET | Refills: 1 | Status: SHIPPED | OUTPATIENT
Start: 2018-01-29 | End: 2018-02-01 | Stop reason: SDUPTHER

## 2018-02-02 RX ORDER — LEVOTHYROXINE SODIUM 0.05 MG/1
50 TABLET ORAL DAILY
Qty: 90 TABLET | Refills: 1 | Status: SHIPPED | OUTPATIENT
Start: 2018-02-02 | End: 2018-07-09

## 2018-02-02 RX ORDER — LEVOTHYROXINE SODIUM 0.05 MG/1
50 TABLET ORAL DAILY
Qty: 90 TABLET | Refills: 1 | Status: SHIPPED | OUTPATIENT
Start: 2018-02-02 | End: 2018-02-02 | Stop reason: SDUPTHER

## 2018-03-12 ENCOUNTER — OFFICE VISIT (OUTPATIENT)
Dept: FAMILY MEDICINE CLINIC | Age: 53
End: 2018-03-12
Payer: COMMERCIAL

## 2018-03-12 VITALS
HEART RATE: 80 BPM | RESPIRATION RATE: 14 BRPM | OXYGEN SATURATION: 99 % | TEMPERATURE: 98.4 F | SYSTOLIC BLOOD PRESSURE: 104 MMHG | BODY MASS INDEX: 26.41 KG/M2 | HEIGHT: 59 IN | WEIGHT: 131 LBS | DIASTOLIC BLOOD PRESSURE: 76 MMHG

## 2018-03-12 DIAGNOSIS — G25.81 RESTLESS LEGS SYNDROME (RLS): ICD-10-CM

## 2018-03-12 DIAGNOSIS — M79.7 FIBROMYALGIA: ICD-10-CM

## 2018-03-12 DIAGNOSIS — K21.9 GASTROESOPHAGEAL REFLUX DISEASE, ESOPHAGITIS PRESENCE NOT SPECIFIED: ICD-10-CM

## 2018-03-12 DIAGNOSIS — E03.9 ACQUIRED HYPOTHYROIDISM: Primary | ICD-10-CM

## 2018-03-12 DIAGNOSIS — F41.9 ANXIETY: ICD-10-CM

## 2018-03-12 DIAGNOSIS — G47.9 SLEEP DIFFICULTIES: ICD-10-CM

## 2018-03-12 DIAGNOSIS — M25.551 ACUTE RIGHT HIP PAIN: ICD-10-CM

## 2018-03-12 DIAGNOSIS — E03.9 ACQUIRED HYPOTHYROIDISM: ICD-10-CM

## 2018-03-12 PROBLEM — Z76.0 MEDICATION REFILL: Status: ACTIVE | Noted: 2018-03-12

## 2018-03-12 LAB
T3 FREE: 2.8 PG/ML (ref 2–4.4)
T4 FREE: 0.94 NG/DL (ref 0.93–1.7)
TSH SERPL DL<=0.05 MIU/L-ACNC: 4.72 UIU/ML (ref 0.27–4.2)

## 2018-03-12 PROCEDURE — 99215 OFFICE O/P EST HI 40 MIN: CPT | Performed by: NURSE PRACTITIONER

## 2018-03-12 RX ORDER — MELOXICAM 15 MG/1
TABLET ORAL
Qty: 90 TABLET | Refills: 1 | Status: SHIPPED | OUTPATIENT
Start: 2018-03-12 | End: 2018-03-12 | Stop reason: SDUPTHER

## 2018-03-12 RX ORDER — OMEPRAZOLE 20 MG/1
CAPSULE, DELAYED RELEASE ORAL
Qty: 90 CAPSULE | Refills: 1 | Status: SHIPPED | OUTPATIENT
Start: 2018-03-12 | End: 2018-12-03 | Stop reason: SDUPTHER

## 2018-03-12 RX ORDER — ALBUTEROL SULFATE 90 UG/1
2 AEROSOL, METERED RESPIRATORY (INHALATION)
COMMUNITY
Start: 2018-01-04 | End: 2019-10-31 | Stop reason: CLARIF

## 2018-03-12 RX ORDER — OMEPRAZOLE 20 MG/1
CAPSULE, DELAYED RELEASE ORAL
Qty: 90 CAPSULE | Refills: 1 | Status: SHIPPED | OUTPATIENT
Start: 2018-03-12 | End: 2018-03-12 | Stop reason: SDUPTHER

## 2018-03-12 RX ORDER — MELOXICAM 15 MG/1
TABLET ORAL
Qty: 90 TABLET | Refills: 1 | Status: SHIPPED | OUTPATIENT
Start: 2018-03-12 | End: 2019-04-06

## 2018-03-12 RX ORDER — ALPRAZOLAM 1 MG/1
TABLET ORAL
Qty: 90 TABLET | Refills: 0 | Status: SHIPPED | OUTPATIENT
Start: 2018-03-12 | End: 2018-03-24 | Stop reason: SDUPTHER

## 2018-03-12 RX ORDER — ALPRAZOLAM 1 MG/1
TABLET ORAL
Qty: 90 TABLET | Refills: 0 | Status: SHIPPED | OUTPATIENT
Start: 2018-03-12 | End: 2018-03-12 | Stop reason: SDUPTHER

## 2018-03-12 ASSESSMENT — ENCOUNTER SYMPTOMS
NAUSEA: 0
CHANGE IN BOWEL HABIT: 0
SORE THROAT: 0
HEARTBURN: 1
CHOKING: 0
BELCHING: 1
WHEEZING: 0
HOARSE VOICE: 0
WATER BRASH: 0
ABDOMINAL PAIN: 0
GLOBUS SENSATION: 0
SWOLLEN GLANDS: 0
COUGH: 0
VOMITING: 0
STRIDOR: 0
VISUAL CHANGE: 0

## 2018-03-12 NOTE — PROGRESS NOTES
Subjective  Brant Kayser, 48 y.o. female presents today with:  Chief Complaint   Patient presents with    Medication Check     CSM xanax    Check-Up     Fibromyalgia, thyroid       RLS:  controlled    Hypothyroidism: Patient presents for evaluation of thyroid function. Symptoms consist of denies fatigue, weight changes, heat/cold intolerance, bowel/skin changes or CVS symptoms. The symptoms are none. The problem has been controlled. Previous thyroid studies include TSH. The hypothyroidism is due to hypothyroidism and Hashimoto's disease      Gastroesophageal Reflux   She complains of belching and heartburn. She reports no abdominal pain, no chest pain, no choking, no coughing, no dysphagia, no early satiety, no globus sensation, no hoarse voice, no nausea, no sore throat, no stridor, no tooth decay, no water brash or no wheezing. This is a new (over the past 3 months) problem. The current episode started more than 1 month ago. The problem occurs constantly (worse at night). The problem has been unchanged. The heartburn duration is several minutes. The heartburn is located in the abdomen and substernum. The heartburn is of moderate intensity. The heartburn wakes her from sleep. The heartburn does not limit her activity. The heartburn changes (worse with laying laying flat ) with position. The symptoms are aggravated by lying down, caffeine and certain foods. Associated symptoms include fatigue. Pertinent negatives include no anemia, melena, muscle weakness, orthopnea or weight loss. There are no known risk factors. She has tried nothing for the symptoms. Past procedures do not include an abdominal ultrasound, an EGD, esophageal manometry, esophageal pH monitoring, H. pylori antibody titer or a UGI. Past invasive treatments do not include gastroplasty, gastroplication or reflux surgery. Hip Pain    The incident occurred more than 1 week ago. There was no injury mechanism. The pain is present in the right hip. Onset    Hypertension Mother     Thyroid Disease Mother     Diabetes Maternal Grandmother      Allergies   Allergen Reactions    Iv Contrast [Iodides] Hives     After injection of isovue 370 75 ml patient developed hives. Two noted total on forehead and right temporal area.  Pcn [Penicillins] Other (See Comments)     syncope  Syncope     Menthol-Methyl Salicylate Rash     Current Outpatient Prescriptions   Medication Sig Dispense Refill    albuterol sulfate  (90 Base) MCG/ACT inhaler Inhale 2 puffs into the lungs      omeprazole (PRILOSEC) 20 MG delayed release capsule TAKE 1 CAPSULE BY MOUTH DAILY 90 capsule 1    meloxicam (MOBIC) 15 MG tablet TK 1 T PO QD PC TILL RELIEF 90 tablet 1    ALPRAZolam (XANAX) 1 MG tablet TAKE 1 TABLET BY MOUTH EVERY NIGHT AT BEDTIME AS NEEDED FOR SLEEP OR ANXIETY. 90 tablet 0    levothyroxine (SYNTHROID) 50 MCG tablet Take 1 tablet by mouth daily 90 tablet 1     No current facility-administered medications for this visit. PMH, Surgical Hx, Family Hx, and Social Hx reviewed and updated. Health Maintenance reviewed. Objective    Vitals:    03/12/18 1013   BP: 104/76   Pulse: 80   Resp: 14   Temp: 98.4 °F (36.9 °C)   TempSrc: Temporal   SpO2: 99%   Weight: 131 lb (59.4 kg)   Height: 4' 11\" (1.499 m)       Physical Exam   Constitutional: She is oriented to person, place, and time. Vital signs are normal. She appears well-developed and well-nourished. She is cooperative. Non-toxic appearance. She does not have a sickly appearance. She does not appear ill. No distress. HENT:   Head: Normocephalic and atraumatic. Right Ear: Tympanic membrane, external ear and ear canal normal.   Left Ear: Tympanic membrane, external ear and ear canal normal.   Nose: Nose normal.   Mouth/Throat: Oropharynx is clear and moist.   Eyes: Conjunctivae, EOM and lids are normal. Pupils are equal, round, and reactive to light. Right eye exhibits no nystagmus.  Left eye exhibits no nystagmus. Neck: Trachea normal and normal range of motion. Neck supple. No JVD present. Carotid bruit is not present. No tracheal deviation present. No thyroid mass and no thyromegaly present. Cardiovascular: Normal rate, regular rhythm, S1 normal, S2 normal, normal heart sounds and intact distal pulses. Exam reveals no gallop. No murmur heard. Pulmonary/Chest: Effort normal and breath sounds normal. No accessory muscle usage. No respiratory distress. She has no decreased breath sounds. She has no wheezes. She has no rhonchi. She has no rales. Abdominal: Soft. Bowel sounds are normal. She exhibits no distension, no ascites and no mass. There is no splenomegaly or hepatomegaly. There is no tenderness. There is no CVA tenderness. No hernia. Musculoskeletal: She exhibits no edema. Lymphadenopathy:        Head (right side): No submandibular, no tonsillar, no preauricular and no posterior auricular adenopathy present. Head (left side): No submandibular, no tonsillar, no preauricular and no posterior auricular adenopathy present. She has no cervical adenopathy. She has no axillary adenopathy. Neurological: She is alert and oriented to person, place, and time. She has normal strength and normal reflexes. She displays no atrophy and no tremor. No cranial nerve deficit or sensory deficit. She exhibits normal muscle tone. She displays a negative Romberg sign. She displays no seizure activity. Coordination and gait normal. GCS eye subscore is 4. GCS verbal subscore is 5. GCS motor subscore is 6. Skin: Skin is warm, dry and intact. She is not diaphoretic. No pallor. Psychiatric: She has a normal mood and affect. Her speech is normal.     Assessment & Plan   Juan Pablo Ruth was seen today for medication check and check-up. Diagnoses and all orders for this visit:    Acquired hypothyroidism  -     T3, Free; Future  -     T4, Free; Future  -     TSH without Reflex;  Future    Sleep difficulties  -

## 2018-03-24 DIAGNOSIS — F41.9 ANXIETY: ICD-10-CM

## 2018-03-24 DIAGNOSIS — G47.9 SLEEP DIFFICULTIES: ICD-10-CM

## 2018-03-27 RX ORDER — ALPRAZOLAM 1 MG/1
TABLET ORAL
Qty: 30 TABLET | Refills: 0 | Status: SHIPPED | OUTPATIENT
Start: 2018-03-27 | End: 2018-04-26

## 2018-05-03 RX ORDER — DULOXETIN HYDROCHLORIDE 60 MG/1
60 CAPSULE, DELAYED RELEASE ORAL DAILY
Qty: 90 CAPSULE | Refills: 1 | Status: SHIPPED | OUTPATIENT
Start: 2018-05-03 | End: 2018-10-01 | Stop reason: SDUPTHER

## 2018-07-09 ENCOUNTER — OFFICE VISIT (OUTPATIENT)
Dept: FAMILY MEDICINE CLINIC | Age: 53
End: 2018-07-09
Payer: COMMERCIAL

## 2018-07-09 VITALS
RESPIRATION RATE: 16 BRPM | HEIGHT: 59 IN | BODY MASS INDEX: 25.6 KG/M2 | WEIGHT: 127 LBS | TEMPERATURE: 97.2 F | DIASTOLIC BLOOD PRESSURE: 70 MMHG | SYSTOLIC BLOOD PRESSURE: 116 MMHG | OXYGEN SATURATION: 99 % | HEART RATE: 73 BPM

## 2018-07-09 DIAGNOSIS — G25.81 RESTLESS LEGS SYNDROME (RLS): ICD-10-CM

## 2018-07-09 DIAGNOSIS — E03.9 ACQUIRED HYPOTHYROIDISM: ICD-10-CM

## 2018-07-09 DIAGNOSIS — Z12.11 SCREEN FOR COLON CANCER: ICD-10-CM

## 2018-07-09 DIAGNOSIS — K21.9 GASTROESOPHAGEAL REFLUX DISEASE, ESOPHAGITIS PRESENCE NOT SPECIFIED: ICD-10-CM

## 2018-07-09 DIAGNOSIS — M79.7 FIBROMYALGIA: ICD-10-CM

## 2018-07-09 DIAGNOSIS — F41.9 ANXIETY: Primary | ICD-10-CM

## 2018-07-09 LAB
T4 TOTAL: 6.6 UG/DL (ref 4.5–11.7)
TSH SERPL DL<=0.05 MIU/L-ACNC: 4.54 UIU/ML (ref 0.27–4.2)

## 2018-07-09 PROCEDURE — G8427 DOCREV CUR MEDS BY ELIG CLIN: HCPCS | Performed by: NURSE PRACTITIONER

## 2018-07-09 PROCEDURE — 99214 OFFICE O/P EST MOD 30 MIN: CPT | Performed by: NURSE PRACTITIONER

## 2018-07-09 PROCEDURE — 3017F COLORECTAL CA SCREEN DOC REV: CPT | Performed by: NURSE PRACTITIONER

## 2018-07-09 PROCEDURE — 1036F TOBACCO NON-USER: CPT | Performed by: NURSE PRACTITIONER

## 2018-07-09 PROCEDURE — G8417 CALC BMI ABV UP PARAM F/U: HCPCS | Performed by: NURSE PRACTITIONER

## 2018-07-09 RX ORDER — ALPRAZOLAM 1 MG/1
TABLET ORAL
Qty: 30 TABLET | Refills: 2 | Status: SHIPPED | OUTPATIENT
Start: 2018-07-09 | End: 2018-10-01 | Stop reason: SDUPTHER

## 2018-07-09 RX ORDER — ALPRAZOLAM 1 MG/1
TABLET ORAL
Refills: 0 | COMMUNITY
Start: 2018-05-24 | End: 2018-07-09 | Stop reason: SDUPTHER

## 2018-07-09 ASSESSMENT — PATIENT HEALTH QUESTIONNAIRE - PHQ9
SUM OF ALL RESPONSES TO PHQ QUESTIONS 1-9: 0
1. LITTLE INTEREST OR PLEASURE IN DOING THINGS: 0
2. FEELING DOWN, DEPRESSED OR HOPELESS: 0
SUM OF ALL RESPONSES TO PHQ9 QUESTIONS 1 & 2: 0

## 2018-07-09 ASSESSMENT — ENCOUNTER SYMPTOMS
WATER BRASH: 0
SORE THROAT: 0
ABDOMINAL PAIN: 0
BELCHING: 0
WHEEZING: 0
CHOKING: 0
HOARSE VOICE: 0
NAUSEA: 0
GLOBUS SENSATION: 0
VOMITING: 0
HEARTBURN: 0
COUGH: 0
STRIDOR: 0

## 2018-07-09 NOTE — PROGRESS NOTES
capsule 1    albuterol sulfate  (90 Base) MCG/ACT inhaler Inhale 2 puffs into the lungs      omeprazole (PRILOSEC) 20 MG delayed release capsule TAKE 1 CAPSULE BY MOUTH DAILY 90 capsule 1    meloxicam (MOBIC) 15 MG tablet TK 1 T PO QD PC TILL RELIEF 90 tablet 1     No current facility-administered medications for this visit. PMH, Surgical Hx, Family Hx, and Social Hx reviewed and updated. Health Maintenance reviewed. Objective    Vitals:    07/09/18 1056   BP: 116/70   Pulse: 73   Resp: 16   Temp: 97.2 °F (36.2 °C)   SpO2: 99%   Weight: 127 lb (57.6 kg)   Height: 4' 11\" (1.499 m)       Physical Exam   Constitutional: She is oriented to person, place, and time. Vital signs are normal. She appears well-developed and well-nourished. She is cooperative. Non-toxic appearance. She does not have a sickly appearance. She does not appear ill. No distress. HENT:   Head: Normocephalic and atraumatic. Right Ear: Tympanic membrane, external ear and ear canal normal.   Left Ear: Tympanic membrane, external ear and ear canal normal.   Nose: Nose normal.   Mouth/Throat: Oropharynx is clear and moist.   Eyes: Conjunctivae, EOM and lids are normal. Pupils are equal, round, and reactive to light. Right eye exhibits no nystagmus. Left eye exhibits no nystagmus. Neck: Trachea normal and normal range of motion. Neck supple. No JVD present. Carotid bruit is not present. No tracheal deviation present. No thyroid mass and no thyromegaly present. Cardiovascular: Normal rate, regular rhythm, S1 normal, S2 normal, normal heart sounds and intact distal pulses. Exam reveals no gallop. No murmur heard. Pulmonary/Chest: Effort normal and breath sounds normal. No accessory muscle usage. No respiratory distress. She has no decreased breath sounds. She has no wheezes. She has no rhonchi. She has no rales. Abdominal: Soft. Bowel sounds are normal. She exhibits no distension, no ascites and no mass.  There is no

## 2018-07-11 ENCOUNTER — TELEPHONE (OUTPATIENT)
Dept: FAMILY MEDICINE CLINIC | Age: 53
End: 2018-07-11

## 2018-08-08 ENCOUNTER — OFFICE VISIT (OUTPATIENT)
Dept: FAMILY MEDICINE CLINIC | Age: 53
End: 2018-08-08
Payer: COMMERCIAL

## 2018-08-08 ENCOUNTER — TELEPHONE (OUTPATIENT)
Dept: FAMILY MEDICINE CLINIC | Age: 53
End: 2018-08-08

## 2018-08-08 VITALS
RESPIRATION RATE: 14 BRPM | WEIGHT: 129.4 LBS | HEART RATE: 75 BPM | OXYGEN SATURATION: 99 % | TEMPERATURE: 97.4 F | HEIGHT: 59 IN | DIASTOLIC BLOOD PRESSURE: 74 MMHG | SYSTOLIC BLOOD PRESSURE: 120 MMHG | BODY MASS INDEX: 26.08 KG/M2

## 2018-08-08 DIAGNOSIS — Z12.39 SCREENING FOR BREAST CANCER: ICD-10-CM

## 2018-08-08 DIAGNOSIS — Z01.419 PAP SMEAR, AS PART OF ROUTINE GYNECOLOGICAL EXAMINATION: ICD-10-CM

## 2018-08-08 DIAGNOSIS — Z01.419 PAP SMEAR, AS PART OF ROUTINE GYNECOLOGICAL EXAMINATION: Primary | ICD-10-CM

## 2018-08-08 PROCEDURE — 99396 PREV VISIT EST AGE 40-64: CPT | Performed by: NURSE PRACTITIONER

## 2018-08-08 ASSESSMENT — PATIENT HEALTH QUESTIONNAIRE - PHQ9
SUM OF ALL RESPONSES TO PHQ9 QUESTIONS 1 & 2: 0
1. LITTLE INTEREST OR PLEASURE IN DOING THINGS: 0
SUM OF ALL RESPONSES TO PHQ QUESTIONS 1-9: 0
SUM OF ALL RESPONSES TO PHQ QUESTIONS 1-9: 0
2. FEELING DOWN, DEPRESSED OR HOPELESS: 0

## 2018-08-08 NOTE — TELEPHONE ENCOUNTER
Everett Goltz called states she was suppose to have a medication called to pharmacy this a.m.  But I never reached pharmacy please advise she's been there twice

## 2018-08-09 RX ORDER — CYCLOBENZAPRINE HCL 5 MG
5 TABLET ORAL 3 TIMES DAILY PRN
Qty: 90 TABLET | Refills: 2 | Status: SHIPPED | OUTPATIENT
Start: 2018-08-09 | End: 2018-09-08

## 2018-08-09 NOTE — TELEPHONE ENCOUNTER
She doent need any med based on her pap unless something come back later. . Unless she means a refill on xanax? ??

## 2018-08-24 ENCOUNTER — HOSPITAL ENCOUNTER (OUTPATIENT)
Dept: WOMENS IMAGING | Age: 53
Discharge: HOME | End: 2018-08-26
Payer: COMMERCIAL

## 2018-08-24 DIAGNOSIS — Z12.39 SCREENING FOR BREAST CANCER: ICD-10-CM

## 2018-08-24 PROCEDURE — 77067 SCR MAMMO BI INCL CAD: CPT

## 2018-08-24 NOTE — LETTER
[facility]  Shellie Gallardo [facility] [place] [address]  Phone: [phone]  Fax: [phone]        August 29, 2018      Gabriela Garcia  [address] [facility] [address]  [address]      Dear Gabriela Garcia    According to the radiologists interpretation, your mammogram did not show any significant findings. Please remember that some cancers (about 10-15%) cannot be found by mammography alone, and that early detection requires a combination of monthly self-examination, yearly physical examination, and periodic mammography. The American Cancer Society Guidelines recommend screening mammograms and physical breast examinations every year beginning at the age of 36. Please continue regular breast self-examinations and report any changes that concern you, even before your next appointment. If you have any further questions, please don't hesitate to call the office.     Sincerely,     Feli Gandara

## 2018-08-28 NOTE — PROGRESS NOTES
tenderness, discharge or bleeding. No labial fusion. There is no rash, tenderness, lesion or injury on the right labia. There is no rash, tenderness, lesion or injury on the left labia. Uterus is not deviated, not enlarged, not fixed and not tender. Cervix exhibits no motion tenderness, no discharge and no friability. Right adnexum displays no mass, no tenderness and no fullness. Left adnexum displays no mass, no tenderness and no fullness. No erythema, tenderness or bleeding in the vagina. No foreign body in the vagina. No signs of injury around the vagina. No vaginal discharge found. Lymphadenopathy:        Right: No inguinal adenopathy present. Left: No inguinal adenopathy present. Neurological: She is alert and oriented to person, place, and time. Skin: Skin is warm and dry. Psychiatric: She has a normal mood and affect. Thought content normal.       No flowsheet data found.     Lab Results   Component Value Date    CHOL 224 12/11/2017    CHOL 185 11/30/2016    CHOL 169 02/19/2016    TRIG 136 12/11/2017    TRIG 103 11/30/2016    TRIG 136 02/19/2016    HDL 63 12/11/2017    HDL 59 11/30/2016    HDL 49 02/19/2016    LDLCALC 134 12/11/2017    LDLCALC 105 11/30/2016    LDLCALC 93 02/19/2016    GLUCOSE 83 12/11/2017    LABA1C 5.3 10/27/2017       The ASCVD Risk score (Lidia Edmonds, et al., 2013) failed to calculate for the following reasons:    Unable to determine if patient is Non-     Immunization History   Administered Date(s) Administered    Influenza Vaccine, unspecified formulation 09/13/2016    Influenza Virus Vaccine 10/01/2017       Health Maintenance   Topic Date Due    HIV screen  03/03/1982    DTaP/Tdap/Td vaccine (1 - Tdap) 03/03/1986    Shingles Vaccine (1 of 2 - 2 Dose Series) 03/12/2019 (Originally 3/3/2017)    Colon cancer screen colonoscopy  08/08/2019 (Originally 3/3/2017)    Flu vaccine (1) 09/01/2018    Breast cancer screen  08/24/2020    Cervical

## 2018-10-01 ENCOUNTER — OFFICE VISIT (OUTPATIENT)
Dept: FAMILY MEDICINE CLINIC | Age: 53
End: 2018-10-01
Payer: COMMERCIAL

## 2018-10-01 VITALS
HEART RATE: 78 BPM | HEIGHT: 59 IN | TEMPERATURE: 97.6 F | SYSTOLIC BLOOD PRESSURE: 124 MMHG | WEIGHT: 129 LBS | DIASTOLIC BLOOD PRESSURE: 70 MMHG | RESPIRATION RATE: 15 BRPM | BODY MASS INDEX: 26 KG/M2

## 2018-10-01 DIAGNOSIS — G25.81 RESTLESS LEGS SYNDROME (RLS): ICD-10-CM

## 2018-10-01 DIAGNOSIS — M25.522 LEFT ELBOW PAIN: ICD-10-CM

## 2018-10-01 DIAGNOSIS — E03.9 ACQUIRED HYPOTHYROIDISM: ICD-10-CM

## 2018-10-01 DIAGNOSIS — K21.9 GASTROESOPHAGEAL REFLUX DISEASE, ESOPHAGITIS PRESENCE NOT SPECIFIED: ICD-10-CM

## 2018-10-01 DIAGNOSIS — F41.9 ANXIETY: Primary | ICD-10-CM

## 2018-10-01 LAB
ALBUMIN SERPL-MCNC: 4 G/DL (ref 3.9–4.9)
ALP BLD-CCNC: 122 U/L (ref 40–130)
ALT SERPL-CCNC: 48 U/L (ref 0–33)
ANION GAP SERPL CALCULATED.3IONS-SCNC: 10 MEQ/L (ref 7–13)
AST SERPL-CCNC: 42 U/L (ref 0–35)
BASOPHILS ABSOLUTE: 0 K/UL (ref 0–0.2)
BASOPHILS RELATIVE PERCENT: 0.6 %
BILIRUB SERPL-MCNC: <0.2 MG/DL (ref 0–1.2)
BUN BLDV-MCNC: 13 MG/DL (ref 6–20)
CALCIUM SERPL-MCNC: 9.5 MG/DL (ref 8.6–10.2)
CHLORIDE BLD-SCNC: 102 MEQ/L (ref 98–107)
CHOLESTEROL, TOTAL: 197 MG/DL (ref 0–199)
CO2: 29 MEQ/L (ref 22–29)
CREAT SERPL-MCNC: 0.6 MG/DL (ref 0.5–0.9)
EOSINOPHILS ABSOLUTE: 0.5 K/UL (ref 0–0.7)
EOSINOPHILS RELATIVE PERCENT: 7.5 %
GFR AFRICAN AMERICAN: >60
GFR NON-AFRICAN AMERICAN: >60
GLOBULIN: 2.7 G/DL (ref 2.3–3.5)
GLUCOSE BLD-MCNC: 82 MG/DL (ref 74–109)
HCT VFR BLD CALC: 40.8 % (ref 37–47)
HDLC SERPL-MCNC: 60 MG/DL (ref 40–59)
HEMOGLOBIN: 13.7 G/DL (ref 12–16)
LDL CHOLESTEROL CALCULATED: 116 MG/DL (ref 0–129)
LYMPHOCYTES ABSOLUTE: 1.4 K/UL (ref 1–4.8)
LYMPHOCYTES RELATIVE PERCENT: 18.7 %
MCH RBC QN AUTO: 30.1 PG (ref 27–31.3)
MCHC RBC AUTO-ENTMCNC: 33.7 % (ref 33–37)
MCV RBC AUTO: 89.5 FL (ref 82–100)
MONOCYTES ABSOLUTE: 0.8 K/UL (ref 0.2–0.8)
MONOCYTES RELATIVE PERCENT: 11.4 %
NEUTROPHILS ABSOLUTE: 4.5 K/UL (ref 1.4–6.5)
NEUTROPHILS RELATIVE PERCENT: 61.8 %
PDW BLD-RTO: 13.6 % (ref 11.5–14.5)
PLATELET # BLD: 230 K/UL (ref 130–400)
POTASSIUM SERPL-SCNC: 4.5 MEQ/L (ref 3.5–5.1)
RBC # BLD: 4.56 M/UL (ref 4.2–5.4)
SODIUM BLD-SCNC: 141 MEQ/L (ref 132–144)
T4 FREE: 1.03 NG/DL (ref 0.93–1.7)
TOTAL PROTEIN: 6.7 G/DL (ref 6.4–8.1)
TRIGL SERPL-MCNC: 105 MG/DL (ref 0–200)
TSH SERPL DL<=0.05 MIU/L-ACNC: 2.48 UIU/ML (ref 0.27–4.2)
WBC # BLD: 7.2 K/UL (ref 4.8–10.8)

## 2018-10-01 PROCEDURE — 99213 OFFICE O/P EST LOW 20 MIN: CPT | Performed by: NURSE PRACTITIONER

## 2018-10-01 RX ORDER — ALPRAZOLAM 1 MG/1
TABLET ORAL
Qty: 30 TABLET | Refills: 2 | Status: SHIPPED | OUTPATIENT
Start: 2018-10-01 | End: 2018-12-17 | Stop reason: SDUPTHER

## 2018-10-01 RX ORDER — DULOXETIN HYDROCHLORIDE 60 MG/1
60 CAPSULE, DELAYED RELEASE ORAL DAILY
Qty: 90 CAPSULE | Refills: 1 | Status: SHIPPED | OUTPATIENT
Start: 2018-10-01 | End: 2019-03-11 | Stop reason: SDUPTHER

## 2018-10-01 ASSESSMENT — ENCOUNTER SYMPTOMS
STRIDOR: 0
HOARSE VOICE: 0
WHEEZING: 0
BELCHING: 0
SORE THROAT: 0
NAUSEA: 0
COUGH: 0
GLOBUS SENSATION: 0
WATER BRASH: 0
ABDOMINAL PAIN: 0
HEARTBURN: 0
CHOKING: 0

## 2018-10-01 NOTE — PROGRESS NOTES
wheezing. Cardiovascular: Negative for chest pain. Gastrointestinal: Negative for abdominal pain, dysphagia, heartburn, melena and nausea. Musculoskeletal: Negative for muscle weakness. Prior to Visit Medications    Medication Sig Taking? Authorizing Provider   DULoxetine (CYMBALTA) 60 MG extended release capsule Take 1 capsule by mouth daily Yes VALENTIN Cantor CNP   ALPRAZolam (XANAX) 1 MG tablet TK 1 T PO HS PRA OR SLP. Yes VALENTIN Cantor CNP   albuterol sulfate  (90 Base) MCG/ACT inhaler Inhale 2 puffs into the lungs  Historical Provider, MD   omeprazole (PRILOSEC) 20 MG delayed release capsule TAKE 1 CAPSULE BY MOUTH DAILY  VALENTIN Cantor CNP   meloxicam (MOBIC) 15 MG tablet TK 1 T PO QD PC TILL RELIEF  VALENTIN Cantor CNP        Social History   Substance Use Topics    Smoking status: Former Smoker     Packs/day: 0.10     Years: 13.00     Types: Cigarettes     Quit date: 10/14/2013    Smokeless tobacco: Never Used    Alcohol use No        Vitals:    10/01/18 1024   BP: 124/70   Pulse: 78   Resp: 15   Temp: 97.6 °F (36.4 °C)   TempSrc: Tympanic   Weight: 129 lb (58.5 kg)   Height: 4' 11\" (1.499 m)     Estimated body mass index is 26.05 kg/m² as calculated from the following:    Height as of this encounter: 4' 11\" (1.499 m). Weight as of this encounter: 129 lb (58.5 kg). Physical Exam   Constitutional: She is oriented to person, place, and time. Vital signs are normal. She appears well-developed and well-nourished. She is cooperative. Non-toxic appearance. She does not have a sickly appearance. She does not appear ill. No distress. HENT:   Head: Normocephalic and atraumatic. Right Ear: Tympanic membrane, external ear and ear canal normal.   Left Ear: Tympanic membrane, external ear and ear canal normal.   Nose: Nose normal.   Mouth/Throat: Oropharynx is clear and moist.   Eyes: Pupils are equal, round, and reactive to light.

## 2018-10-18 ENCOUNTER — HOSPITAL ENCOUNTER (OUTPATIENT)
Dept: ORTHOPEDIC SURGERY | Age: 53
Discharge: HOME | End: 2018-10-20
Payer: COMMERCIAL

## 2018-10-18 DIAGNOSIS — M25.529 ARTHRALGIA OF UPPER ARM, UNSPECIFIED LATERALITY: ICD-10-CM

## 2018-10-18 PROCEDURE — 73080 X-RAY EXAM OF ELBOW: CPT

## 2018-12-03 DIAGNOSIS — K21.9 GASTROESOPHAGEAL REFLUX DISEASE, ESOPHAGITIS PRESENCE NOT SPECIFIED: ICD-10-CM

## 2018-12-04 RX ORDER — OMEPRAZOLE 20 MG/1
CAPSULE, DELAYED RELEASE ORAL
Qty: 30 CAPSULE | Refills: 3 | Status: SHIPPED | OUTPATIENT
Start: 2018-12-04 | End: 2019-01-11 | Stop reason: SDUPTHER

## 2018-12-17 DIAGNOSIS — F41.9 ANXIETY: ICD-10-CM

## 2018-12-20 RX ORDER — ALPRAZOLAM 1 MG/1
TABLET ORAL
Qty: 30 TABLET | Refills: 0 | Status: SHIPPED | OUTPATIENT
Start: 2018-12-20 | End: 2019-01-11 | Stop reason: SDUPTHER

## 2018-12-26 ENCOUNTER — OFFICE VISIT (OUTPATIENT)
Dept: FAMILY MEDICINE CLINIC | Age: 53
End: 2018-12-26
Payer: COMMERCIAL

## 2018-12-26 VITALS
HEIGHT: 59 IN | SYSTOLIC BLOOD PRESSURE: 130 MMHG | HEART RATE: 68 BPM | DIASTOLIC BLOOD PRESSURE: 82 MMHG | TEMPERATURE: 97.3 F | BODY MASS INDEX: 26.21 KG/M2 | OXYGEN SATURATION: 99 % | WEIGHT: 130 LBS

## 2018-12-26 DIAGNOSIS — J02.9 SORE THROAT: ICD-10-CM

## 2018-12-26 DIAGNOSIS — J06.9 ACUTE UPPER RESPIRATORY INFECTION: Primary | ICD-10-CM

## 2018-12-26 DIAGNOSIS — R52 BODY ACHES: ICD-10-CM

## 2018-12-26 LAB
INFLUENZA A ANTIBODY: NEGATIVE
INFLUENZA B ANTIBODY: NEGATIVE
S PYO AG THROAT QL: NORMAL

## 2018-12-26 PROCEDURE — 87804 INFLUENZA ASSAY W/OPTIC: CPT | Performed by: NURSE PRACTITIONER

## 2018-12-26 PROCEDURE — 87880 STREP A ASSAY W/OPTIC: CPT | Performed by: NURSE PRACTITIONER

## 2018-12-26 PROCEDURE — 99213 OFFICE O/P EST LOW 20 MIN: CPT | Performed by: NURSE PRACTITIONER

## 2018-12-26 ASSESSMENT — ENCOUNTER SYMPTOMS
VOMITING: 0
WHEEZING: 0
ABDOMINAL PAIN: 0
RHINORRHEA: 1
SWOLLEN GLANDS: 1
COUGH: 1
SHORTNESS OF BREATH: 1
SORE THROAT: 1
SINUS PAIN: 1

## 2018-12-26 NOTE — PROGRESS NOTES
CAPSULE BY MOUTH DAILY 30 capsule 3    DULoxetine (CYMBALTA) 60 MG extended release capsule Take 1 capsule by mouth daily 90 capsule 1    meloxicam (MOBIC) 15 MG tablet TK 1 T PO QD PC TILL RELIEF 90 tablet 1    albuterol sulfate  (90 Base) MCG/ACT inhaler Inhale 2 puffs into the lungs       No current facility-administered medications on file prior to visit. Allergies: Iv contrast [iodides]; Pcn [penicillins]; and Menthol-methyl salicylate    Review of Systems   Constitutional: Negative for chills and fever. HENT: Positive for congestion, ear pain, rhinorrhea, sinus pain, sneezing and sore throat. Respiratory: Positive for cough and shortness of breath. Negative for wheezing. Cardiovascular: Negative for chest pain. Gastrointestinal: Negative for abdominal pain and vomiting. Neurological: Positive for headaches. Objective:   /82   Pulse 68   Temp 97.3 °F (36.3 °C) (Tympanic)   Ht 4' 11\" (1.499 m)   Wt 130 lb (59 kg)   SpO2 99%   BMI 26.26 kg/m²     Physical Exam   Constitutional: She appears well-developed and well-nourished. No distress. HENT:   Head: Normocephalic and atraumatic. Right Ear: Tympanic membrane is not erythematous and not bulging. Left Ear: Tympanic membrane is not erythematous and not bulging. Mouth/Throat: No oropharyngeal exudate. Eyes: Conjunctivae are normal. Right eye exhibits no discharge. Left eye exhibits no discharge. No scleral icterus. Cardiovascular: Normal rate, regular rhythm and normal heart sounds. Exam reveals no gallop and no friction rub. No murmur heard. Pulmonary/Chest: Effort normal and breath sounds normal. No stridor. No respiratory distress. She has no wheezes. She has no rales. She exhibits no tenderness. Lymphadenopathy:     She has no cervical adenopathy. Skin: Skin is warm and dry. No rash noted. She is not diaphoretic. No erythema. Vitals reviewed. Assessment:       Diagnosis Orders   1.  Acute

## 2018-12-29 LAB — THROAT CULTURE: NORMAL

## 2019-01-11 ENCOUNTER — OFFICE VISIT (OUTPATIENT)
Dept: FAMILY MEDICINE CLINIC | Age: 54
End: 2019-01-11
Payer: COMMERCIAL

## 2019-01-11 VITALS
BODY MASS INDEX: 26.61 KG/M2 | HEIGHT: 59 IN | SYSTOLIC BLOOD PRESSURE: 114 MMHG | TEMPERATURE: 97.3 F | RESPIRATION RATE: 14 BRPM | HEART RATE: 77 BPM | DIASTOLIC BLOOD PRESSURE: 70 MMHG | WEIGHT: 132 LBS

## 2019-01-11 DIAGNOSIS — G47.9 SLEEP DIFFICULTIES: ICD-10-CM

## 2019-01-11 DIAGNOSIS — E03.9 ACQUIRED HYPOTHYROIDISM: ICD-10-CM

## 2019-01-11 DIAGNOSIS — J01.90 ACUTE BACTERIAL SINUSITIS: Primary | ICD-10-CM

## 2019-01-11 DIAGNOSIS — B96.89 ACUTE BACTERIAL SINUSITIS: Primary | ICD-10-CM

## 2019-01-11 DIAGNOSIS — F41.9 ANXIETY: ICD-10-CM

## 2019-01-11 DIAGNOSIS — K21.9 GASTROESOPHAGEAL REFLUX DISEASE, ESOPHAGITIS PRESENCE NOT SPECIFIED: ICD-10-CM

## 2019-01-11 LAB
ALBUMIN SERPL-MCNC: 3.9 G/DL (ref 3.9–4.9)
ALP BLD-CCNC: 108 U/L (ref 40–130)
ALT SERPL-CCNC: 26 U/L (ref 0–33)
ANION GAP SERPL CALCULATED.3IONS-SCNC: 13 MEQ/L (ref 7–13)
AST SERPL-CCNC: 27 U/L (ref 0–35)
BASOPHILS ABSOLUTE: 0.1 K/UL (ref 0–0.2)
BASOPHILS RELATIVE PERCENT: 0.8 %
BILIRUB SERPL-MCNC: 0.3 MG/DL (ref 0–1.2)
BUN BLDV-MCNC: 17 MG/DL (ref 6–20)
CALCIUM SERPL-MCNC: 9.2 MG/DL (ref 8.6–10.2)
CHLORIDE BLD-SCNC: 105 MEQ/L (ref 98–107)
CHOLESTEROL, TOTAL: 194 MG/DL (ref 0–199)
CO2: 27 MEQ/L (ref 22–29)
CREAT SERPL-MCNC: 0.6 MG/DL (ref 0.5–0.9)
EOSINOPHILS ABSOLUTE: 0.5 K/UL (ref 0–0.7)
EOSINOPHILS RELATIVE PERCENT: 6.2 %
GFR AFRICAN AMERICAN: >60
GFR NON-AFRICAN AMERICAN: >60
GLOBULIN: 2.9 G/DL (ref 2.3–3.5)
GLUCOSE BLD-MCNC: 81 MG/DL (ref 74–109)
HCT VFR BLD CALC: 40.4 % (ref 37–47)
HDLC SERPL-MCNC: 59 MG/DL (ref 40–59)
HEMOGLOBIN: 13.6 G/DL (ref 12–16)
LDL CHOLESTEROL CALCULATED: 112 MG/DL (ref 0–129)
LYMPHOCYTES ABSOLUTE: 1.8 K/UL (ref 1–4.8)
LYMPHOCYTES RELATIVE PERCENT: 23.3 %
MCH RBC QN AUTO: 30.1 PG (ref 27–31.3)
MCHC RBC AUTO-ENTMCNC: 33.6 % (ref 33–37)
MCV RBC AUTO: 89.6 FL (ref 82–100)
MONOCYTES ABSOLUTE: 0.9 K/UL (ref 0.2–0.8)
MONOCYTES RELATIVE PERCENT: 10.9 %
NEUTROPHILS ABSOLUTE: 4.6 K/UL (ref 1.4–6.5)
NEUTROPHILS RELATIVE PERCENT: 58.8 %
PDW BLD-RTO: 13.7 % (ref 11.5–14.5)
PLATELET # BLD: 203 K/UL (ref 130–400)
POTASSIUM SERPL-SCNC: 4.7 MEQ/L (ref 3.5–5.1)
RBC # BLD: 4.51 M/UL (ref 4.2–5.4)
SODIUM BLD-SCNC: 145 MEQ/L (ref 132–144)
T4 FREE: 0.93 NG/DL (ref 0.93–1.7)
TOTAL PROTEIN: 6.8 G/DL (ref 6.4–8.1)
TRIGL SERPL-MCNC: 117 MG/DL (ref 0–200)
TSH SERPL DL<=0.05 MIU/L-ACNC: 2.84 UIU/ML (ref 0.27–4.2)
WBC # BLD: 7.8 K/UL (ref 4.8–10.8)

## 2019-01-11 PROCEDURE — G8427 DOCREV CUR MEDS BY ELIG CLIN: HCPCS | Performed by: NURSE PRACTITIONER

## 2019-01-11 PROCEDURE — 3017F COLORECTAL CA SCREEN DOC REV: CPT | Performed by: NURSE PRACTITIONER

## 2019-01-11 PROCEDURE — 99214 OFFICE O/P EST MOD 30 MIN: CPT | Performed by: NURSE PRACTITIONER

## 2019-01-11 PROCEDURE — G8417 CALC BMI ABV UP PARAM F/U: HCPCS | Performed by: NURSE PRACTITIONER

## 2019-01-11 PROCEDURE — 1036F TOBACCO NON-USER: CPT | Performed by: NURSE PRACTITIONER

## 2019-01-11 PROCEDURE — G8484 FLU IMMUNIZE NO ADMIN: HCPCS | Performed by: NURSE PRACTITIONER

## 2019-01-11 RX ORDER — CLARITHROMYCIN 500 MG/1
500 TABLET, COATED ORAL 2 TIMES DAILY
Qty: 20 TABLET | Refills: 0 | Status: SHIPPED | OUTPATIENT
Start: 2019-01-11 | End: 2019-01-21

## 2019-01-11 RX ORDER — ALPRAZOLAM 1 MG/1
TABLET ORAL
Qty: 30 TABLET | Refills: 2 | Status: SHIPPED | OUTPATIENT
Start: 2019-01-11 | End: 2019-01-25

## 2019-01-11 RX ORDER — OMEPRAZOLE 20 MG/1
CAPSULE, DELAYED RELEASE ORAL
Qty: 30 CAPSULE | Refills: 3 | Status: SHIPPED | OUTPATIENT
Start: 2019-01-11 | End: 2019-07-20 | Stop reason: SDUPTHER

## 2019-01-20 ASSESSMENT — ENCOUNTER SYMPTOMS
RHINORRHEA: 1
SWOLLEN GLANDS: 1
COUGH: 1
HOARSE VOICE: 0
VOMITING: 0
GLOBUS SENSATION: 0
NAUSEA: 0
SINUS PAIN: 1
SORE THROAT: 1
STRIDOR: 0
HEARTBURN: 0
ABDOMINAL PAIN: 0
WATER BRASH: 0
CHOKING: 0
BELCHING: 0
WHEEZING: 0

## 2019-01-24 ENCOUNTER — TELEPHONE (OUTPATIENT)
Dept: FAMILY MEDICINE CLINIC | Age: 54
End: 2019-01-24

## 2019-01-24 DIAGNOSIS — F41.9 ANXIETY: Primary | ICD-10-CM

## 2019-01-24 DIAGNOSIS — G47.9 SLEEP DIFFICULTIES: ICD-10-CM

## 2019-01-25 RX ORDER — ALPRAZOLAM 0.5 MG/1
1 TABLET ORAL NIGHTLY PRN
Qty: 60 TABLET | Refills: 1 | Status: SHIPPED | OUTPATIENT
Start: 2019-01-25 | End: 2019-02-26 | Stop reason: SDUPTHER

## 2019-01-28 ENCOUNTER — TELEPHONE (OUTPATIENT)
Dept: FAMILY MEDICINE CLINIC | Age: 54
End: 2019-01-28

## 2019-01-28 DIAGNOSIS — G47.9 SLEEP DIFFICULTIES: ICD-10-CM

## 2019-01-28 DIAGNOSIS — F41.9 ANXIETY: ICD-10-CM

## 2019-02-05 ENCOUNTER — OFFICE VISIT (OUTPATIENT)
Dept: FAMILY MEDICINE CLINIC | Age: 54
End: 2019-02-05
Payer: COMMERCIAL

## 2019-02-05 VITALS
BODY MASS INDEX: 26 KG/M2 | RESPIRATION RATE: 14 BRPM | SYSTOLIC BLOOD PRESSURE: 122 MMHG | HEIGHT: 59 IN | HEART RATE: 80 BPM | DIASTOLIC BLOOD PRESSURE: 70 MMHG | WEIGHT: 129 LBS | TEMPERATURE: 96.9 F

## 2019-02-05 DIAGNOSIS — R50.9 FEVER, UNSPECIFIED FEVER CAUSE: ICD-10-CM

## 2019-02-05 DIAGNOSIS — J02.9 SORE THROAT: ICD-10-CM

## 2019-02-05 DIAGNOSIS — J02.0 STREP THROAT: Primary | ICD-10-CM

## 2019-02-05 LAB
INFLUENZA A ANTIBODY: NORMAL
INFLUENZA B ANTIBODY: NORMAL
S PYO AG THROAT QL: NORMAL

## 2019-02-05 PROCEDURE — 99213 OFFICE O/P EST LOW 20 MIN: CPT | Performed by: NURSE PRACTITIONER

## 2019-02-05 PROCEDURE — 87804 INFLUENZA ASSAY W/OPTIC: CPT | Performed by: NURSE PRACTITIONER

## 2019-02-05 PROCEDURE — 87880 STREP A ASSAY W/OPTIC: CPT | Performed by: NURSE PRACTITIONER

## 2019-02-05 RX ORDER — CLINDAMYCIN HYDROCHLORIDE 300 MG/1
300 CAPSULE ORAL 3 TIMES DAILY
Qty: 30 CAPSULE | Refills: 0 | Status: SHIPPED | OUTPATIENT
Start: 2019-02-05 | End: 2019-02-15

## 2019-02-08 LAB — THROAT CULTURE: NORMAL

## 2019-02-18 ASSESSMENT — ENCOUNTER SYMPTOMS
NAUSEA: 1
ABDOMINAL PAIN: 0
WHEEZING: 0
VOMITING: 0
COUGH: 0
DIARRHEA: 0
SORE THROAT: 1

## 2019-02-26 DIAGNOSIS — F41.9 ANXIETY: ICD-10-CM

## 2019-02-26 DIAGNOSIS — G47.9 SLEEP DIFFICULTIES: ICD-10-CM

## 2019-03-01 RX ORDER — ALPRAZOLAM 0.5 MG/1
1 TABLET ORAL NIGHTLY PRN
Qty: 60 TABLET | Refills: 1 | Status: SHIPPED | OUTPATIENT
Start: 2019-03-01 | End: 2019-04-15 | Stop reason: SDUPTHER

## 2019-03-08 DIAGNOSIS — F41.9 ANXIETY: ICD-10-CM

## 2019-03-11 RX ORDER — DULOXETIN HYDROCHLORIDE 60 MG/1
60 CAPSULE, DELAYED RELEASE ORAL DAILY
Qty: 90 CAPSULE | Refills: 1 | Status: SHIPPED | OUTPATIENT
Start: 2019-03-11 | End: 2019-03-13 | Stop reason: SDUPTHER

## 2019-03-13 DIAGNOSIS — F41.9 ANXIETY: ICD-10-CM

## 2019-03-19 RX ORDER — DULOXETIN HYDROCHLORIDE 60 MG/1
CAPSULE, DELAYED RELEASE ORAL
Qty: 90 CAPSULE | Refills: 0 | Status: SHIPPED | OUTPATIENT
Start: 2019-03-19 | End: 2019-08-30 | Stop reason: SDUPTHER

## 2019-04-06 RX ORDER — MELOXICAM 15 MG/1
TABLET ORAL
Qty: 90 TABLET | Refills: 0 | Status: SHIPPED | OUTPATIENT
Start: 2019-04-06 | End: 2019-10-31 | Stop reason: CLARIF

## 2019-04-15 ENCOUNTER — OFFICE VISIT (OUTPATIENT)
Dept: FAMILY MEDICINE CLINIC | Age: 54
End: 2019-04-15
Payer: COMMERCIAL

## 2019-04-15 VITALS
WEIGHT: 131 LBS | TEMPERATURE: 96.4 F | HEART RATE: 70 BPM | DIASTOLIC BLOOD PRESSURE: 70 MMHG | HEIGHT: 59 IN | RESPIRATION RATE: 14 BRPM | BODY MASS INDEX: 26.41 KG/M2 | SYSTOLIC BLOOD PRESSURE: 110 MMHG

## 2019-04-15 DIAGNOSIS — G47.9 SLEEP DIFFICULTIES: ICD-10-CM

## 2019-04-15 DIAGNOSIS — R52 BODY ACHES: ICD-10-CM

## 2019-04-15 DIAGNOSIS — E03.9 ACQUIRED HYPOTHYROIDISM: Primary | ICD-10-CM

## 2019-04-15 DIAGNOSIS — F41.9 ANXIETY: ICD-10-CM

## 2019-04-15 DIAGNOSIS — E28.39 OVARIAN FAILURE: ICD-10-CM

## 2019-04-15 DIAGNOSIS — N95.1 MENOPAUSAL VASOMOTOR SYNDROME: ICD-10-CM

## 2019-04-15 PROCEDURE — G8427 DOCREV CUR MEDS BY ELIG CLIN: HCPCS | Performed by: NURSE PRACTITIONER

## 2019-04-15 PROCEDURE — G8417 CALC BMI ABV UP PARAM F/U: HCPCS | Performed by: NURSE PRACTITIONER

## 2019-04-15 PROCEDURE — 3017F COLORECTAL CA SCREEN DOC REV: CPT | Performed by: NURSE PRACTITIONER

## 2019-04-15 PROCEDURE — 99214 OFFICE O/P EST MOD 30 MIN: CPT | Performed by: NURSE PRACTITIONER

## 2019-04-15 PROCEDURE — 1036F TOBACCO NON-USER: CPT | Performed by: NURSE PRACTITIONER

## 2019-04-15 RX ORDER — VENLAFAXINE HYDROCHLORIDE 37.5 MG/1
37.5 CAPSULE, EXTENDED RELEASE ORAL DAILY
Qty: 30 CAPSULE | Refills: 3 | Status: SHIPPED | OUTPATIENT
Start: 2019-04-15 | End: 2019-10-31 | Stop reason: CLARIF

## 2019-04-15 RX ORDER — ALPRAZOLAM 1 MG/1
1 TABLET ORAL NIGHTLY PRN
Qty: 30 TABLET | Refills: 1 | Status: SHIPPED | OUTPATIENT
Start: 2019-04-15 | End: 2019-07-05 | Stop reason: SDUPTHER

## 2019-04-15 ASSESSMENT — PATIENT HEALTH QUESTIONNAIRE - PHQ9
1. LITTLE INTEREST OR PLEASURE IN DOING THINGS: 0
SUM OF ALL RESPONSES TO PHQ9 QUESTIONS 1 & 2: 0
2. FEELING DOWN, DEPRESSED OR HOPELESS: 0
SUM OF ALL RESPONSES TO PHQ QUESTIONS 1-9: 0
SUM OF ALL RESPONSES TO PHQ QUESTIONS 1-9: 0

## 2019-04-15 ASSESSMENT — ENCOUNTER SYMPTOMS
CHOKING: 0
SORE THROAT: 0
WHEEZING: 0
HEARTBURN: 0
WATER BRASH: 0
HOARSE VOICE: 0
NAUSEA: 0
COUGH: 0
GLOBUS SENSATION: 0
ABDOMINAL PAIN: 0
STRIDOR: 0
BELCHING: 0

## 2019-04-16 DIAGNOSIS — E03.9 ACQUIRED HYPOTHYROIDISM: ICD-10-CM

## 2019-04-16 LAB
ALBUMIN SERPL-MCNC: 3.8 G/DL (ref 3.5–4.6)
ALP BLD-CCNC: 86 U/L (ref 40–130)
ALT SERPL-CCNC: 18 U/L (ref 0–33)
ANION GAP SERPL CALCULATED.3IONS-SCNC: 11 MEQ/L (ref 9–15)
AST SERPL-CCNC: 20 U/L (ref 0–35)
BILIRUB SERPL-MCNC: <0.2 MG/DL (ref 0.2–0.7)
BUN BLDV-MCNC: 15 MG/DL (ref 6–20)
CALCIUM SERPL-MCNC: 9.1 MG/DL (ref 8.5–9.9)
CHLORIDE BLD-SCNC: 100 MEQ/L (ref 95–107)
CO2: 29 MEQ/L (ref 20–31)
CREAT SERPL-MCNC: 0.63 MG/DL (ref 0.5–0.9)
GFR AFRICAN AMERICAN: >60
GFR NON-AFRICAN AMERICAN: >60
GLOBULIN: 2.7 G/DL (ref 2.3–3.5)
GLUCOSE BLD-MCNC: 85 MG/DL (ref 70–99)
HCT VFR BLD CALC: 37.6 % (ref 37–47)
HEMOGLOBIN: 12.6 G/DL (ref 12–16)
MCH RBC QN AUTO: 30 PG (ref 27–31.3)
MCHC RBC AUTO-ENTMCNC: 33.5 % (ref 33–37)
MCV RBC AUTO: 89.7 FL (ref 82–100)
PDW BLD-RTO: 14 % (ref 11.5–14.5)
PLATELET # BLD: 213 K/UL (ref 130–400)
POTASSIUM SERPL-SCNC: 4.4 MEQ/L (ref 3.4–4.9)
RBC # BLD: 4.19 M/UL (ref 4.2–5.4)
SODIUM BLD-SCNC: 140 MEQ/L (ref 135–144)
T4 FREE: 0.98 NG/DL (ref 0.84–1.68)
TOTAL PROTEIN: 6.5 G/DL (ref 6.3–8)
TSH SERPL DL<=0.05 MIU/L-ACNC: 3.33 UIU/ML (ref 0.44–3.86)
WBC # BLD: 7.5 K/UL (ref 4.8–10.8)

## 2019-04-16 NOTE — PROGRESS NOTES
4/15/2019     Marcie Winters (:  1965) is a 48 y.o. female, here for evaluation of the following medical concerns:    RLS:  controlled    Hypothyroidism: Patient presents for evaluation of thyroid function. Symptoms consist of denies fatigue, weight changes, heat/cold intolerance, bowel/skin changes or CVS symptoms. The symptoms are none. The problem has been controlled. Previous thyroid studies include TSH. The hypothyroidism is due to hypothyroidism and Hashimoto's disease    Gastroesophageal Reflux   She reports no abdominal pain, no belching, no chest pain, no choking, no coughing, no dysphagia, no early satiety, no globus sensation, no heartburn, no hoarse voice, no nausea, no sore throat, no stridor, no tooth decay, no water brash or no wheezing. This is a new (over the past 3 months) problem. The current episode started more than 1 month ago. The problem occurs constantly (worse at night). The problem has been unchanged. The heartburn duration is several minutes. The heartburn is located in the abdomen and substernum. The heartburn is of moderate intensity. The heartburn wakes her from sleep. The heartburn does not limit her activity. The heartburn changes (worse with laying laying flat ) with position. The symptoms are aggravated by lying down, caffeine and certain foods. Associated symptoms include fatigue. Pertinent negatives include no anemia, melena, muscle weakness, orthopnea or weight loss. There are no known risk factors. She has tried nothing for the symptoms. Past procedures do not include an abdominal ultrasound, an EGD, esophageal manometry, esophageal pH monitoring, H. pylori antibody titer or a UGI. Past invasive treatments do not include gastroplasty, gastroplication or reflux surgery. Review of Systems   Constitutional: Positive for fatigue. Negative for weight loss. HENT: Negative for hoarse voice and sore throat.     Respiratory: Negative for cough, choking and Normocephalic and atraumatic. Right Ear: Tympanic membrane, external ear and ear canal normal.   Left Ear: Tympanic membrane, external ear and ear canal normal.   Nose: Nose normal.   Mouth/Throat: Oropharynx is clear and moist.   Eyes: Pupils are equal, round, and reactive to light. Conjunctivae, EOM and lids are normal. Right eye exhibits no nystagmus. Left eye exhibits no nystagmus. Neck: Trachea normal and normal range of motion. Neck supple. No JVD present. Carotid bruit is not present. No tracheal deviation present. No thyroid mass and no thyromegaly present. Cardiovascular: Normal rate, regular rhythm, S1 normal, S2 normal, normal heart sounds and intact distal pulses. Exam reveals no gallop. No murmur heard. Pulmonary/Chest: Effort normal and breath sounds normal. No accessory muscle usage. No respiratory distress. She has no decreased breath sounds. She has no wheezes. She has no rhonchi. She has no rales. Abdominal: Soft. Bowel sounds are normal. She exhibits no distension, no ascites and no mass. There is no splenomegaly or hepatomegaly. There is no tenderness. There is no CVA tenderness. No hernia. Musculoskeletal: She exhibits no edema. Lymphadenopathy:        Head (right side): No submandibular, no tonsillar, no preauricular and no posterior auricular adenopathy present. Head (left side): No submandibular, no tonsillar, no preauricular and no posterior auricular adenopathy present. She has no cervical adenopathy. She has no axillary adenopathy. Neurological: She is alert and oriented to person, place, and time. She has normal strength and normal reflexes. She displays no atrophy and no tremor. No cranial nerve deficit or sensory deficit. She exhibits normal muscle tone. She displays a negative Romberg sign. She displays no seizure activity. Coordination and gait normal. GCS eye subscore is 4. GCS verbal subscore is 5. GCS motor subscore is 6.    Skin: Skin is warm, dry effects of the medication prescribed today, as well as treatment plan/ rationale and result expectations have been discussed with the patient who expresses understanding and desires to proceed. Close follow up to evaluate treatment results and for coordination of care. I have reviewed the patient's medical history in detail and updated the computerized patient record. Return in about 3 months (around 7/15/2019). An electronic signature was used to authenticate this note.     --Chasity Beard, VALENTIN - CNP on 4/15/2019 at 9:23 PM

## 2019-04-27 ENCOUNTER — EMPLOYEE WELLNESS (OUTPATIENT)
Dept: OTHER | Age: 54
End: 2019-04-27

## 2019-04-27 LAB
CHOLESTEROL, TOTAL: 176 MG/DL (ref 0–199)
GLUCOSE BLD-MCNC: 89 MG/DL (ref 70–99)
HDLC SERPL-MCNC: 55 MG/DL (ref 40–59)
LDL CHOLESTEROL CALCULATED: 106 MG/DL (ref 0–129)
TRIGL SERPL-MCNC: 73 MG/DL (ref 0–150)

## 2019-07-15 ENCOUNTER — OFFICE VISIT (OUTPATIENT)
Dept: FAMILY MEDICINE CLINIC | Age: 54
End: 2019-07-15
Payer: COMMERCIAL

## 2019-07-15 VITALS
DIASTOLIC BLOOD PRESSURE: 72 MMHG | TEMPERATURE: 97.2 F | BODY MASS INDEX: 27.29 KG/M2 | HEART RATE: 70 BPM | WEIGHT: 130 LBS | SYSTOLIC BLOOD PRESSURE: 124 MMHG | RESPIRATION RATE: 15 BRPM | HEIGHT: 58 IN

## 2019-07-15 DIAGNOSIS — Z12.11 COLON CANCER SCREENING: Primary | ICD-10-CM

## 2019-07-15 DIAGNOSIS — F41.9 ANXIETY: ICD-10-CM

## 2019-07-15 DIAGNOSIS — G50.0 TRIGEMINAL NEURALGIA OF RIGHT SIDE OF FACE: ICD-10-CM

## 2019-07-15 DIAGNOSIS — E03.9 ACQUIRED HYPOTHYROIDISM: ICD-10-CM

## 2019-07-15 LAB
C-REACTIVE PROTEIN: 1.3 MG/L (ref 0–5)
HCT VFR BLD CALC: 39.4 % (ref 37–47)
HEMOGLOBIN: 13.3 G/DL (ref 12–16)
MCH RBC QN AUTO: 30.6 PG (ref 27–31.3)
MCHC RBC AUTO-ENTMCNC: 33.8 % (ref 33–37)
MCV RBC AUTO: 90.7 FL (ref 82–100)
PDW BLD-RTO: 14 % (ref 11.5–14.5)
PLATELET # BLD: 236 K/UL (ref 130–400)
RBC # BLD: 4.34 M/UL (ref 4.2–5.4)
SEDIMENTATION RATE, ERYTHROCYTE: 8 MM (ref 0–30)
URIC ACID, SERUM: 2.4 MG/DL (ref 2.4–5.7)
WBC # BLD: 8.4 K/UL (ref 4.8–10.8)

## 2019-07-15 PROCEDURE — 99214 OFFICE O/P EST MOD 30 MIN: CPT | Performed by: NURSE PRACTITIONER

## 2019-07-15 RX ORDER — CARBAMAZEPINE 100 MG/1
100 TABLET, EXTENDED RELEASE ORAL 2 TIMES DAILY
Qty: 60 TABLET | Refills: 3 | Status: SHIPPED | OUTPATIENT
Start: 2019-07-15 | End: 2019-10-31 | Stop reason: CLARIF

## 2019-07-20 DIAGNOSIS — K21.9 GASTROESOPHAGEAL REFLUX DISEASE, ESOPHAGITIS PRESENCE NOT SPECIFIED: ICD-10-CM

## 2019-07-22 DIAGNOSIS — K21.9 GASTROESOPHAGEAL REFLUX DISEASE, ESOPHAGITIS PRESENCE NOT SPECIFIED: ICD-10-CM

## 2019-07-22 RX ORDER — OMEPRAZOLE 20 MG/1
CAPSULE, DELAYED RELEASE ORAL
Qty: 30 CAPSULE | Refills: 2 | Status: SHIPPED | OUTPATIENT
Start: 2019-07-22 | End: 2020-01-24

## 2019-07-22 RX ORDER — OMEPRAZOLE 20 MG/1
CAPSULE, DELAYED RELEASE ORAL
Qty: 30 CAPSULE | Refills: 2 | Status: SHIPPED | OUTPATIENT
Start: 2019-07-22 | End: 2019-07-22 | Stop reason: SDUPTHER

## 2019-07-24 ENCOUNTER — TELEPHONE (OUTPATIENT)
Dept: FAMILY MEDICINE CLINIC | Age: 54
End: 2019-07-24

## 2019-07-24 DIAGNOSIS — G50.0 TRIGEMINAL NEURALGIA: Primary | ICD-10-CM

## 2019-07-26 ENCOUNTER — HOSPITAL ENCOUNTER (EMERGENCY)
Age: 54
Discharge: HOME | End: 2019-07-26
Attending: EMERGENCY MEDICINE
Payer: COMMERCIAL

## 2019-07-26 VITALS
HEIGHT: 59 IN | BODY MASS INDEX: 26.21 KG/M2 | WEIGHT: 130 LBS | TEMPERATURE: 98.1 F | SYSTOLIC BLOOD PRESSURE: 146 MMHG | OXYGEN SATURATION: 99 % | HEART RATE: 76 BPM | DIASTOLIC BLOOD PRESSURE: 93 MMHG | RESPIRATION RATE: 14 BRPM

## 2019-07-26 DIAGNOSIS — M54.2 NECK PAIN: Primary | ICD-10-CM

## 2019-07-26 PROCEDURE — 96372 THER/PROPH/DIAG INJ SC/IM: CPT

## 2019-07-26 PROCEDURE — 6360000002 HC RX W HCPCS: Performed by: NURSE PRACTITIONER

## 2019-07-26 PROCEDURE — 99282 EMERGENCY DEPT VISIT SF MDM: CPT

## 2019-07-26 RX ORDER — KETOROLAC TROMETHAMINE 30 MG/ML
30 INJECTION, SOLUTION INTRAMUSCULAR; INTRAVENOUS ONCE
Status: COMPLETED | OUTPATIENT
Start: 2019-07-26 | End: 2019-07-26

## 2019-07-26 RX ORDER — METHYLPREDNISOLONE 4 MG/1
TABLET ORAL
Qty: 1 KIT | Refills: 0 | Status: SHIPPED | OUTPATIENT
Start: 2019-07-26 | End: 2019-10-31 | Stop reason: CLARIF

## 2019-07-26 RX ADMIN — KETOROLAC TROMETHAMINE 30 MG: 30 INJECTION, SOLUTION INTRAMUSCULAR at 11:18

## 2019-07-26 ASSESSMENT — ENCOUNTER SYMPTOMS
COUGH: 0
SHORTNESS OF BREATH: 0
ABDOMINAL PAIN: 0
SINUS PAIN: 0
TROUBLE SWALLOWING: 0
EYE REDNESS: 0
EYE PAIN: 0
NAUSEA: 0
SORE THROAT: 0
VOMITING: 0
BACK PAIN: 0

## 2019-07-26 ASSESSMENT — PAIN DESCRIPTION - LOCATION: LOCATION: NECK

## 2019-07-26 ASSESSMENT — PAIN DESCRIPTION - PAIN TYPE: TYPE: ACUTE PAIN

## 2019-07-26 ASSESSMENT — PAIN DESCRIPTION - ONSET: ONSET: ON-GOING

## 2019-07-26 ASSESSMENT — PAIN DESCRIPTION - DIRECTION: RADIATING_TOWARDS: RIGHT HAND

## 2019-07-26 ASSESSMENT — PAIN DESCRIPTION - PROGRESSION: CLINICAL_PROGRESSION: GRADUALLY WORSENING

## 2019-07-26 ASSESSMENT — PAIN SCALES - GENERAL
PAINLEVEL_OUTOF10: 10
PAINLEVEL_OUTOF10: 10

## 2019-07-26 ASSESSMENT — PAIN DESCRIPTION - FREQUENCY: FREQUENCY: CONTINUOUS

## 2019-07-26 ASSESSMENT — PAIN DESCRIPTION - DESCRIPTORS: DESCRIPTORS: ACHING;BURNING;SHARP

## 2019-07-26 ASSESSMENT — PAIN DESCRIPTION - ORIENTATION: ORIENTATION: RIGHT

## 2019-07-26 NOTE — PROGRESS NOTES
CLINICAL PHARMACY NOTE: MEDS TO [address] Select Patient?: Yes  Total # of Prescriptions Filled: 1   The following medications were delivered to the patient:  · Medrol dosepak tablet  Total # of Interventions Completed: 0  Time Spent (min): 30    Additional Documentation:

## 2019-07-28 ASSESSMENT — ENCOUNTER SYMPTOMS
WATER BRASH: 0
BELCHING: 0
WHEEZING: 0
STRIDOR: 0
GLOBUS SENSATION: 0
HEARTBURN: 0
COUGH: 0
SORE THROAT: 0
HOARSE VOICE: 0
ABDOMINAL PAIN: 0
NAUSEA: 0
CHOKING: 0

## 2019-07-29 NOTE — PROGRESS NOTES
and sore throat. Respiratory: Negative for cough, choking and wheezing. Cardiovascular: Negative for chest pain. Gastrointestinal: Negative for abdominal pain, dysphagia, heartburn, melena and nausea. Musculoskeletal: Positive for neck pain. Negative for muscle weakness. Prior to Visit Medications    Medication Sig Taking? Authorizing Provider   carBAMazepine (TEGRETOL-XR) 100 MG extended release tablet Take 1 tablet by mouth 2 times daily Yes VALENTIN Cantor CNP   methylPREDNISolone (MEDROL, CAMERON,) 4 MG tablet Take by mouth. VALENTIN Mullins CNP   omeprazole (PRILOSEC) 20 MG delayed release capsule TAKE 1 CAPSULE BY MOUTH ONE TIME A DAY  VALENTIN Cantor CNP   ALPRAZolam (XANAX) 1 MG tablet TAKE 1 TABLET BY MOUTH EVERY NIGHT AT BEDTIME AS NEEDED FOR SLEEP  VALENTIN Cantor CNP   venlafaxine (EFFEXOR XR) 37.5 MG extended release capsule Take 1 capsule by mouth daily  VALENTIN Cantor CNP   meloxicam (MOBIC) 15 MG tablet TAKE 1 TABLET BY MOUTH EVERY DAY AFTER A MEAL UNTIL RELIEF  VALENTIN Cantor CNP   DULoxetine (CYMBALTA) 60 MG extended release capsule TAKE ONE CAPSULE BY MOUTH ONE TIME DAILY  VALENTIN Cantor CNP   albuterol sulfate  (90 Base) MCG/ACT inhaler Inhale 2 puffs into the lungs  Historical Provider, MD        Social History     Tobacco Use    Smoking status: Former Smoker     Packs/day: 0.10     Years: 13.00     Pack years: 1.30     Types: Cigarettes     Last attempt to quit: 10/14/2013     Years since quittin.7    Smokeless tobacco: Never Used   Substance Use Topics    Alcohol use: No        Vitals:    07/15/19 1304   BP: 124/72   Pulse: 70   Resp: 15   Temp: 97.2 °F (36.2 °C)   TempSrc: Oral   Weight: 130 lb (59 kg)   Height: 4' 10\" (1.473 m)     Estimated body mass index is 27.17 kg/m² as calculated from the following:    Height as of this encounter: 4' 10\" (1.473 m).     Weight as of this encounter: 130 lb (59 are no discontinued medications. No follow-ups on file. Reviewed with the patient: current clinical status, medications, activities and diet. Side effects, adverse effects of the medication prescribed today, as well as treatment plan/ rationale and result expectations have been discussed with the patient who expresses understanding and desires to proceed. Close follow up to evaluate treatment results and for coordination of care. I have reviewed the patient's medical history in detail and updated the computerized patient record. Return in about 3 months (around 7/15/2019). An electronic signature was used to authenticate this note.     --VALENTIN Petit - CNP on 7/28/2019 at 11:15 PM

## 2019-08-30 DIAGNOSIS — F41.9 ANXIETY: ICD-10-CM

## 2019-09-02 RX ORDER — DULOXETIN HYDROCHLORIDE 60 MG/1
CAPSULE, DELAYED RELEASE ORAL
Qty: 90 CAPSULE | Refills: 0 | Status: SHIPPED | OUTPATIENT
Start: 2019-09-02 | End: 2019-12-27

## 2019-09-25 ENCOUNTER — HOSPITAL ENCOUNTER (OUTPATIENT)
Dept: GENERAL RADIOLOGY | Age: 54
Discharge: HOME | End: 2019-09-27
Payer: COMMERCIAL

## 2019-09-25 DIAGNOSIS — M54.2 CERVICALGIA: ICD-10-CM

## 2019-09-25 PROCEDURE — 72050 X-RAY EXAM NECK SPINE 4/5VWS: CPT

## 2019-10-01 ENCOUNTER — HOSPITAL ENCOUNTER (OUTPATIENT)
Dept: GENERAL RADIOLOGY | Age: 54
Discharge: HOME | End: 2019-10-03
Payer: COMMERCIAL

## 2019-10-01 ENCOUNTER — OFFICE VISIT (OUTPATIENT)
Dept: PULMONOLOGY | Age: 54
End: 2019-10-01
Payer: COMMERCIAL

## 2019-10-01 VITALS
RESPIRATION RATE: 16 BRPM | HEART RATE: 78 BPM | SYSTOLIC BLOOD PRESSURE: 132 MMHG | HEIGHT: 59 IN | OXYGEN SATURATION: 98 % | BODY MASS INDEX: 26.29 KG/M2 | WEIGHT: 130.4 LBS | TEMPERATURE: 96.8 F | DIASTOLIC BLOOD PRESSURE: 74 MMHG

## 2019-10-01 DIAGNOSIS — R06.09 DOE (DYSPNEA ON EXERTION): ICD-10-CM

## 2019-10-01 DIAGNOSIS — R06.09 DOE (DYSPNEA ON EXERTION): Primary | ICD-10-CM

## 2019-10-01 PROCEDURE — 71046 X-RAY EXAM CHEST 2 VIEWS: CPT

## 2019-10-01 PROCEDURE — 99204 OFFICE O/P NEW MOD 45 MIN: CPT | Performed by: INTERNAL MEDICINE

## 2019-10-01 RX ORDER — ALPRAZOLAM 1 MG/1
TABLET ORAL
Refills: 2 | COMMUNITY
Start: 2019-09-06 | End: 2019-10-08

## 2019-10-01 RX ORDER — FLUTICASONE FUROATE AND VILANTEROL 200; 25 UG/1; UG/1
1 POWDER RESPIRATORY (INHALATION) DAILY
Qty: 1 EACH | Refills: 3 | COMMUNITY
Start: 2019-10-01 | End: 2019-11-12 | Stop reason: ALTCHOICE

## 2019-10-04 LAB
ALLERGEN ASPERGILLUS FUMIGATUS IGE: <0.1 KU/L
ALLERGEN BERMUDA GRASS IGE: <0.1 KU/L
ALLERGEN BIRCH IGE: <0.1 KU/L
ALLERGEN CAT DANDER IGE: <0.1 KU/L
ALLERGEN COMMON SHORT RAGWEED IGE: <0.1 KU/L
ALLERGEN COTTONWOOD: <0.1 KU/L
ALLERGEN DOG DANDER IGE: <0.1 KU/L
ALLERGEN ELM IGE: <0.1 KU/L
ALLERGEN FUNGI/MOLD M.RACEMOSUS IGE: <0.1 KU/L
ALLERGEN GERMAN COCKROACH IGE: <0.1 KU/L
ALLERGEN HORMODENDRUM HORDEI IGE: <0.1 KU/L
ALLERGEN MAPLE/BOX ELDER IGE: <0.1 KU/L
ALLERGEN MITE DUST FARINAE IGE: <0.1 KU/L
ALLERGEN MITE DUST PTERONYSSINUS IGE: <0.1 KU/L
ALLERGEN MOUNTAIN CEDAR: <0.1 KU/L
ALLERGEN MOUSE EPITHELIA IGE: <0.1 KU/L
ALLERGEN OAK TREE IGE: <0.1 KU/L
ALLERGEN PECAN TREE IGE: <0.1 KU/L
ALLERGEN PENICILLIUM NOTATUM: <0.1 KU/L
ALLERGEN ROUGH PIGWEED (W14) IGE: <0.1 KU/L
ALLERGEN RUSSIAN THISTLE IGE: <0.1 KU/L
ALLERGEN SEE NOTE: NORMAL
ALLERGEN SHEEP SORREL (W18) IGE: <0.1 KU/L
ALLERGEN TIMOTHY GRASS: <0.1 KU/L
ALLERGEN TREE SYCAMORE: <0.1 KU/L
ALLERGEN WALNUT TREE IGE: <0.1 KU/L
ALLERGEN WHITE MULBERRY TREE, IGE: <0.1 KU/L
ALLERGEN, TREE, WHITE ASH IGE: <0.1 KU/L
IGE: 74 KU/L

## 2019-10-04 RX ORDER — ALPRAZOLAM 1 MG/1
TABLET ORAL
Qty: 30 TABLET | Refills: 2 | OUTPATIENT
Start: 2019-10-04

## 2019-10-06 DIAGNOSIS — F41.9 ANXIETY: ICD-10-CM

## 2019-10-06 DIAGNOSIS — G47.9 SLEEP DIFFICULTIES: ICD-10-CM

## 2019-10-08 DIAGNOSIS — F41.9 ANXIETY: Primary | ICD-10-CM

## 2019-10-08 RX ORDER — ALPRAZOLAM 1 MG/1
1 TABLET ORAL NIGHTLY PRN
Qty: 90 TABLET | Refills: 0 | Status: SHIPPED | OUTPATIENT
Start: 2019-10-08 | End: 2019-10-31

## 2019-10-08 RX ORDER — ALPRAZOLAM 1 MG/1
TABLET ORAL
Qty: 30 TABLET | Refills: 0 | Status: SHIPPED | OUTPATIENT
Start: 2019-10-08 | End: 2019-10-31 | Stop reason: CLARIF

## 2019-10-24 ENCOUNTER — OFFICE VISIT (OUTPATIENT)
Dept: PULMONOLOGY | Age: 54
End: 2019-10-24
Payer: COMMERCIAL

## 2019-10-24 VITALS
OXYGEN SATURATION: 98 % | RESPIRATION RATE: 16 BRPM | BODY MASS INDEX: 26.45 KG/M2 | DIASTOLIC BLOOD PRESSURE: 80 MMHG | WEIGHT: 131.2 LBS | TEMPERATURE: 96.8 F | HEART RATE: 88 BPM | SYSTOLIC BLOOD PRESSURE: 128 MMHG | HEIGHT: 59 IN

## 2019-10-24 DIAGNOSIS — R06.09 DOE (DYSPNEA ON EXERTION): Primary | ICD-10-CM

## 2019-10-24 PROCEDURE — 99214 OFFICE O/P EST MOD 30 MIN: CPT | Performed by: INTERNAL MEDICINE

## 2019-10-31 ENCOUNTER — OFFICE VISIT (OUTPATIENT)
Dept: FAMILY MEDICINE CLINIC | Age: 54
End: 2019-10-31
Payer: COMMERCIAL

## 2019-10-31 VITALS
SYSTOLIC BLOOD PRESSURE: 130 MMHG | WEIGHT: 131 LBS | RESPIRATION RATE: 12 BRPM | TEMPERATURE: 98.4 F | OXYGEN SATURATION: 98 % | HEIGHT: 59 IN | HEART RATE: 86 BPM | DIASTOLIC BLOOD PRESSURE: 84 MMHG | BODY MASS INDEX: 26.41 KG/M2

## 2019-10-31 DIAGNOSIS — Z12.11 COLON CANCER SCREENING: ICD-10-CM

## 2019-10-31 DIAGNOSIS — R53.82 CHRONIC FATIGUE: Primary | ICD-10-CM

## 2019-10-31 DIAGNOSIS — E03.9 ACQUIRED HYPOTHYROIDISM: ICD-10-CM

## 2019-10-31 DIAGNOSIS — Z12.31 ENCOUNTER FOR SCREENING MAMMOGRAM FOR MALIGNANT NEOPLASM OF BREAST: ICD-10-CM

## 2019-10-31 DIAGNOSIS — G47.9 SLEEP DIFFICULTIES: ICD-10-CM

## 2019-10-31 PROCEDURE — 99214 OFFICE O/P EST MOD 30 MIN: CPT | Performed by: NURSE PRACTITIONER

## 2019-10-31 RX ORDER — ALPRAZOLAM 1 MG/1
1 TABLET ORAL NIGHTLY PRN
Qty: 90 TABLET | Refills: 0 | Status: SHIPPED | OUTPATIENT
Start: 2019-10-31 | End: 2020-01-24

## 2019-10-31 RX ORDER — LEVOTHYROXINE AND LIOTHYRONINE 19; 4.5 UG/1; UG/1
30 TABLET ORAL DAILY
Qty: 90 TABLET | Refills: 1 | Status: SHIPPED | OUTPATIENT
Start: 2019-10-31 | End: 2020-02-10

## 2019-11-05 ENCOUNTER — HOSPITAL ENCOUNTER (OUTPATIENT)
Dept: PULMONOLOGY | Age: 54
Discharge: HOME | End: 2019-11-05
Payer: COMMERCIAL

## 2019-11-05 DIAGNOSIS — R06.09 DOE (DYSPNEA ON EXERTION): ICD-10-CM

## 2019-11-05 PROCEDURE — 94010 BREATHING CAPACITY TEST: CPT

## 2019-11-05 PROCEDURE — 94729 DIFFUSING CAPACITY: CPT | Performed by: INTERNAL MEDICINE

## 2019-11-05 PROCEDURE — 94726 PLETHYSMOGRAPHY LUNG VOLUMES: CPT | Performed by: INTERNAL MEDICINE

## 2019-11-05 PROCEDURE — 94010 BREATHING CAPACITY TEST: CPT | Performed by: INTERNAL MEDICINE

## 2019-11-05 PROCEDURE — 94729 DIFFUSING CAPACITY: CPT

## 2019-11-05 PROCEDURE — 94726 PLETHYSMOGRAPHY LUNG VOLUMES: CPT

## 2019-11-11 ENCOUNTER — HOSPITAL ENCOUNTER (OUTPATIENT)
Dept: WOMENS IMAGING | Age: 54
Discharge: HOME | End: 2019-11-13
Payer: COMMERCIAL

## 2019-11-11 DIAGNOSIS — Z12.31 ENCOUNTER FOR SCREENING MAMMOGRAM FOR MALIGNANT NEOPLASM OF BREAST: ICD-10-CM

## 2019-11-11 PROCEDURE — 77063 BREAST TOMOSYNTHESIS BI: CPT

## 2019-11-12 ENCOUNTER — OFFICE VISIT (OUTPATIENT)
Dept: PULMONOLOGY | Age: 54
End: 2019-11-12
Payer: COMMERCIAL

## 2019-11-12 VITALS
RESPIRATION RATE: 16 BRPM | SYSTOLIC BLOOD PRESSURE: 128 MMHG | HEIGHT: 59 IN | TEMPERATURE: 97 F | DIASTOLIC BLOOD PRESSURE: 76 MMHG | WEIGHT: 133.2 LBS | BODY MASS INDEX: 26.85 KG/M2 | OXYGEN SATURATION: 98 % | HEART RATE: 73 BPM

## 2019-11-12 DIAGNOSIS — J45.50 SEVERE PERSISTENT ASTHMA WITHOUT COMPLICATION: ICD-10-CM

## 2019-11-12 DIAGNOSIS — R09.81 NASAL CONGESTION: ICD-10-CM

## 2019-11-12 DIAGNOSIS — R06.09 DOE (DYSPNEA ON EXERTION): Primary | ICD-10-CM

## 2019-11-12 PROCEDURE — 99213 OFFICE O/P EST LOW 20 MIN: CPT | Performed by: INTERNAL MEDICINE

## 2019-11-12 RX ORDER — FLUTICASONE PROPIONATE 50 MCG
1 SPRAY, SUSPENSION (ML) NASAL DAILY
Qty: 1 BOTTLE | Refills: 3 | Status: SHIPPED | OUTPATIENT
Start: 2019-11-12 | End: 2021-04-13 | Stop reason: CLARIF

## 2019-11-12 RX ORDER — MONTELUKAST SODIUM 10 MG/1
10 TABLET ORAL DAILY
Qty: 30 TABLET | Refills: 3 | Status: SHIPPED | OUTPATIENT
Start: 2019-11-12 | End: 2021-01-13 | Stop reason: SDUPTHER

## 2019-11-12 ASSESSMENT — ENCOUNTER SYMPTOMS
BELCHING: 0
STRIDOR: 0
WHEEZING: 0
HEARTBURN: 0
ABDOMINAL PAIN: 0
HOARSE VOICE: 0
NAUSEA: 0
SORE THROAT: 0
GLOBUS SENSATION: 0
WATER BRASH: 0
CHOKING: 0
COUGH: 0

## 2019-11-25 ENCOUNTER — OFFICE VISIT (OUTPATIENT)
Dept: FAMILY MEDICINE CLINIC | Age: 54
End: 2019-11-25
Payer: COMMERCIAL

## 2019-11-25 VITALS
HEART RATE: 69 BPM | TEMPERATURE: 98.7 F | WEIGHT: 131 LBS | SYSTOLIC BLOOD PRESSURE: 124 MMHG | HEIGHT: 59 IN | DIASTOLIC BLOOD PRESSURE: 76 MMHG | BODY MASS INDEX: 26.41 KG/M2 | OXYGEN SATURATION: 99 %

## 2019-11-25 DIAGNOSIS — N89.8 VAGINAL ODOR: ICD-10-CM

## 2019-11-25 DIAGNOSIS — N76.0 ACUTE VAGINITIS: Primary | ICD-10-CM

## 2019-11-25 DIAGNOSIS — N76.0 ACUTE VAGINITIS: ICD-10-CM

## 2019-11-25 PROCEDURE — 99213 OFFICE O/P EST LOW 20 MIN: CPT | Performed by: NURSE PRACTITIONER

## 2019-11-25 RX ORDER — METRONIDAZOLE 500 MG/1
500 TABLET ORAL 2 TIMES DAILY
Qty: 14 TABLET | Refills: 0 | Status: SHIPPED | OUTPATIENT
Start: 2019-11-25 | End: 2019-12-02

## 2019-11-25 ASSESSMENT — ENCOUNTER SYMPTOMS
BACK PAIN: 0
CONSTIPATION: 0
SORE THROAT: 0

## 2019-11-26 LAB
CLUE CELLS: NORMAL
TRICHOMONAS PREP: NORMAL
TRICHOMONAS VAGINALIS SCREEN: NEGATIVE
YEAST WET PREP: NORMAL

## 2019-12-06 ENCOUNTER — HOSPITAL ENCOUNTER (OUTPATIENT)
Dept: CT IMAGING | Age: 54
Discharge: HOME | End: 2019-12-08
Payer: COMMERCIAL

## 2019-12-06 VITALS
BODY MASS INDEX: 26.41 KG/M2 | WEIGHT: 131 LBS | HEIGHT: 59 IN | HEART RATE: 71 BPM | RESPIRATION RATE: 16 BRPM | SYSTOLIC BLOOD PRESSURE: 108 MMHG | DIASTOLIC BLOOD PRESSURE: 87 MMHG

## 2019-12-06 DIAGNOSIS — R06.09 DOE (DYSPNEA ON EXERTION): ICD-10-CM

## 2019-12-06 PROCEDURE — 71250 CT THORAX DX C-: CPT

## 2019-12-16 ENCOUNTER — OFFICE VISIT (OUTPATIENT)
Dept: FAMILY MEDICINE CLINIC | Age: 54
End: 2019-12-16
Payer: COMMERCIAL

## 2019-12-16 VITALS
OXYGEN SATURATION: 98 % | DIASTOLIC BLOOD PRESSURE: 84 MMHG | WEIGHT: 131.2 LBS | BODY MASS INDEX: 26.5 KG/M2 | HEART RATE: 90 BPM | TEMPERATURE: 97.9 F | SYSTOLIC BLOOD PRESSURE: 130 MMHG

## 2019-12-16 DIAGNOSIS — J40 BRONCHITIS: ICD-10-CM

## 2019-12-16 DIAGNOSIS — B96.89 ACUTE BACTERIAL SINUSITIS: ICD-10-CM

## 2019-12-16 DIAGNOSIS — R05.9 COUGH: Primary | ICD-10-CM

## 2019-12-16 DIAGNOSIS — J01.90 ACUTE BACTERIAL SINUSITIS: ICD-10-CM

## 2019-12-16 LAB
INFLUENZA A ANTIBODY: NORMAL
INFLUENZA B ANTIBODY: NORMAL

## 2019-12-16 PROCEDURE — 87804 INFLUENZA ASSAY W/OPTIC: CPT | Performed by: NURSE PRACTITIONER

## 2019-12-16 PROCEDURE — 99213 OFFICE O/P EST LOW 20 MIN: CPT | Performed by: NURSE PRACTITIONER

## 2019-12-16 RX ORDER — PREDNISONE 10 MG/1
TABLET ORAL
Qty: 18 TABLET | Refills: 0 | Status: SHIPPED | OUTPATIENT
Start: 2019-12-16 | End: 2019-12-25

## 2019-12-16 RX ORDER — AZITHROMYCIN 250 MG/1
TABLET, FILM COATED ORAL
Qty: 1 PACKET | Refills: 0 | Status: SHIPPED | OUTPATIENT
Start: 2019-12-16 | End: 2020-01-20 | Stop reason: ALTCHOICE

## 2019-12-16 ASSESSMENT — ENCOUNTER SYMPTOMS
SORE THROAT: 0
SINUS PRESSURE: 1
COUGH: 1
SHORTNESS OF BREATH: 1
WHEEZING: 1
RHINORRHEA: 1

## 2019-12-27 DIAGNOSIS — F41.9 ANXIETY: ICD-10-CM

## 2019-12-31 RX ORDER — DULOXETIN HYDROCHLORIDE 60 MG/1
CAPSULE, DELAYED RELEASE ORAL
Qty: 90 CAPSULE | Refills: 0 | Status: SHIPPED | OUTPATIENT
Start: 2019-12-31 | End: 2019-12-31

## 2020-01-04 ENCOUNTER — HOSPITAL ENCOUNTER (OUTPATIENT)
Dept: MRI IMAGING | Age: 55
Discharge: HOME | End: 2020-01-06
Payer: COMMERCIAL

## 2020-01-04 PROCEDURE — 72141 MRI NECK SPINE W/O DYE: CPT

## 2020-01-20 ENCOUNTER — OFFICE VISIT (OUTPATIENT)
Dept: PULMONOLOGY | Age: 55
End: 2020-01-20
Payer: COMMERCIAL

## 2020-01-20 VITALS
OXYGEN SATURATION: 97 % | HEART RATE: 73 BPM | WEIGHT: 135.4 LBS | HEIGHT: 59 IN | TEMPERATURE: 97.4 F | DIASTOLIC BLOOD PRESSURE: 72 MMHG | RESPIRATION RATE: 15 BRPM | SYSTOLIC BLOOD PRESSURE: 110 MMHG | BODY MASS INDEX: 27.3 KG/M2

## 2020-01-20 PROCEDURE — 99214 OFFICE O/P EST MOD 30 MIN: CPT | Performed by: INTERNAL MEDICINE

## 2020-01-20 RX ORDER — VENLAFAXINE HYDROCHLORIDE 37.5 MG/1
CAPSULE, EXTENDED RELEASE ORAL
COMMUNITY
Start: 2019-12-23 | End: 2020-02-05

## 2020-01-20 NOTE — PROGRESS NOTES
Subjective:     Primitivo Kilpatrick is a 48 y.o. female who complains today of:     Chief Complaint   Patient presents with    Follow-up     BAIRD       HPI  Patient presents for asthma Follow-up,     she still has dyspnea with exertion, limiting her daily activities at work, she has cough nonproductive through the day and she still gets episodes of coughing at night on average once a week, no chest pain, no fever, no nasal congestion or postnasal drip, no nasal polyps, she has history of heartburn controlled on Prilosec, no lower extremity edema, weight is stable. Allergies:   Iv contrast [iodides]; Pcn [penicillins]; and Menthol-methyl salicylate  Past Medical History:   Diagnosis Date    Chronic fatigue 2017    Dizziness 2017    HTN (hypertension)     Hypothyroidism     Lightheadedness 2017    SOB (shortness of breath) 2017    Weakness 2017     Past Surgical History:   Procedure Laterality Date    PARTIAL HYSTERECTOMY       Family History   Problem Relation Age of Onset    Hypertension Mother     Thyroid Disease Mother     Diabetes Maternal Grandmother      Social History     Socioeconomic History    Marital status: Single     Spouse name: Not on file    Number of children: Not on file    Years of education: Not on file    Highest education level: Not on file   Occupational History    Not on file   Social Needs    Financial resource strain: Not on file    Food insecurity:     Worry: Not on file     Inability: Not on file    Transportation needs:     Medical: Not on file     Non-medical: Not on file   Tobacco Use    Smoking status: Former Smoker     Packs/day: 0.10     Years: 13.00     Pack years: 1.30     Types: Cigarettes     Last attempt to quit: 10/14/2013     Years since quittin.2    Smokeless tobacco: Never Used   Substance and Sexual Activity    Alcohol use: No    Drug use: No    Sexual activity: Yes   Lifestyle    Physical activity:     Days per week: Not on file     Minutes per session: Not on file    Stress: Not on file   Relationships    Social connections:     Talks on phone: Not on file     Gets together: Not on file     Attends Jain service: Not on file     Active member of club or organization: Not on file     Attends meetings of clubs or organizations: Not on file     Relationship status: Not on file    Intimate partner violence:     Fear of current or ex partner: Not on file     Emotionally abused: Not on file     Physically abused: Not on file     Forced sexual activity: Not on file   Other Topics Concern    Not on file   Social History Narrative    Not on file         Review of Systems      ROS: 10 organs review of system is done including general, psychological, ENT, hematological, endocrine, respiratory, cardiovascular, gastrointestinal,musculoskeletal, neurological,  allergy and Immunology is done and is otherwise negative. Current Outpatient Medications   Medication Sig Dispense Refill    venlafaxine (EFFEXOR XR) 37.5 MG extended release capsule       mepolizumab (NUCALA) 100 MG SOLR injection Inject 1 mL into the skin once for 1 dose 1 mL 5    DULoxetine (CYMBALTA) 60 MG extended release capsule Take 1 capsule by mouth daily 90 capsule 3    fluticasone-umeclidin-vilant (TRELEGY ELLIPTA) 100-62.5-25 MCG/INH AEPB Inhale 1 puff into the lungs daily 1 each 3    montelukast (SINGULAIR) 10 MG tablet Take 1 tablet by mouth daily 30 tablet 3    fluticasone (FLONASE) 50 MCG/ACT nasal spray 1 spray by Each Nostril route daily 1 Bottle 3    ALPRAZolam (XANAX) 1 MG tablet Take 1 tablet by mouth nightly as needed for Sleep for up to 90 days.  90 tablet 0    thyroid (ARMOUR) 30 MG tablet Take 1 tablet by mouth daily 90 tablet 1    fluticasone (ARNUITY ELLIPTA) 100 MCG/ACT AEPB Inhale 1 puff into the lungs daily 1 each 3    omeprazole (PRILOSEC) 20 MG delayed release capsule TAKE 1 CAPSULE BY MOUTH ONE TIME A DAY 30 capsule 2 No current facility-administered medications for this visit. Objective:     Vitals:    01/20/20 1404   BP: 110/72   Site: Right Upper Arm   Position: Sitting   Pulse: 73   Resp: 15   Temp: 97.4 °F (36.3 °C)   TempSrc: Tympanic   SpO2: 97%   Weight: 135 lb 6.4 oz (61.4 kg)   Height: 4' 11\" (1.499 m)         Physical Exam  Constitutional:       General: She is not in acute distress. Appearance: She is well-developed. She is not diaphoretic. HENT:      Head: Normocephalic and atraumatic. Eyes:      Conjunctiva/sclera: Conjunctivae normal.      Pupils: Pupils are equal, round, and reactive to light. Neck:      Musculoskeletal: Normal range of motion and neck supple. Cardiovascular:      Rate and Rhythm: Normal rate and regular rhythm. Heart sounds: No murmur. No friction rub. No gallop. Pulmonary:      Effort: Pulmonary effort is normal. No respiratory distress. Breath sounds: Normal breath sounds. No wheezing or rales. Chest:      Chest wall: No tenderness. Abdominal:      General: There is no distension. Palpations: Abdomen is soft. Tenderness: There is no tenderness. There is no rebound. Musculoskeletal:         General: No tenderness. Right lower leg: No edema. Left lower leg: No edema. Lymphadenopathy:      Cervical: No cervical adenopathy. Skin:     General: Skin is warm and dry. Findings: No erythema. Neurological:      Mental Status: She is alert and oriented to person, place, and time. Psychiatric:         Judgment: Judgment normal.       Imaging studies reviewed by me high-resolution CT scan no interstitial lung disease  Lab results reviewed in chart  PFT essentially normal FEV1 85%       Assessment and Plan       Diagnosis Orders   1. Severe persistent asthma without complication  mepolizumab (NUCALA) 100 MG SOLR injection   2. Nasal congestion     3.  Gastroesophageal reflux disease without esophagitis       · Patient symptoms are not controlled despite maximal treatment will treat with no Nucala  · Continue  · Continue PPI  · Yearly flu shot      No orders of the defined types were placed in this encounter. Orders Placed This Encounter   Medications    mepolizumab (NUCALA) 100 MG SOLR injection     Sig: Inject 1 mL into the skin once for 1 dose     Dispense:  1 mL     Refill:  5     Discussed with patient the importance of exercise and weight control and  overall health and well-being.     Reviewed with the patient: current clinical status, medications, activities and diet.      Side effects, adverse effects of the medication prescribed today, as well as treatment plan and result expectations have been discussed with the patient who expresses understanding and desires to proceed.           Return in about 12 weeks (around 4/13/2020).       Bentley Kumar MD

## 2020-01-21 ENCOUNTER — TELEPHONE (OUTPATIENT)
Dept: PULMONOLOGY | Age: 55
End: 2020-01-21

## 2020-01-23 DIAGNOSIS — J45.50 SEVERE PERSISTENT ASTHMA WITHOUT COMPLICATION: ICD-10-CM

## 2020-01-23 LAB
BASOPHILS ABSOLUTE: 0 K/UL (ref 0–0.2)
BASOPHILS RELATIVE PERCENT: 0.6 %
EOSINOPHILS ABSOLUTE: 0.1 K/UL (ref 0–0.7)
EOSINOPHILS RELATIVE PERCENT: 1.5 %
HCT VFR BLD CALC: 40.7 % (ref 37–47)
HEMOGLOBIN: 13.5 G/DL (ref 12–16)
LYMPHOCYTES ABSOLUTE: 2.1 K/UL (ref 1–4.8)
LYMPHOCYTES RELATIVE PERCENT: 26.4 %
MCH RBC QN AUTO: 29.9 PG (ref 27–31.3)
MCHC RBC AUTO-ENTMCNC: 33.2 % (ref 33–37)
MCV RBC AUTO: 89.9 FL (ref 82–100)
MONOCYTES ABSOLUTE: 0.8 K/UL (ref 0.2–0.8)
MONOCYTES RELATIVE PERCENT: 9.7 %
NEUTROPHILS ABSOLUTE: 4.9 K/UL (ref 1.4–6.5)
NEUTROPHILS RELATIVE PERCENT: 61.8 %
PDW BLD-RTO: 13.5 % (ref 11.5–14.5)
PLATELET # BLD: 259 K/UL (ref 130–400)
RBC # BLD: 4.53 M/UL (ref 4.2–5.4)
WBC # BLD: 8 K/UL (ref 4.8–10.8)

## 2020-01-24 RX ORDER — ALPRAZOLAM 1 MG/1
TABLET ORAL
Qty: 90 TABLET | Refills: 0 | Status: SHIPPED | OUTPATIENT
Start: 2020-01-24 | End: 2020-01-31 | Stop reason: SDUPTHER

## 2020-01-24 RX ORDER — OMEPRAZOLE 20 MG/1
CAPSULE, DELAYED RELEASE ORAL
Qty: 30 CAPSULE | Refills: 2 | Status: SHIPPED | OUTPATIENT
Start: 2020-01-24 | End: 2020-08-31 | Stop reason: SDUPTHER

## 2020-01-24 RX ORDER — OMEPRAZOLE 20 MG/1
CAPSULE, DELAYED RELEASE ORAL
Qty: 30 CAPSULE | Refills: 2 | OUTPATIENT
Start: 2020-01-24

## 2020-01-24 RX ORDER — ALPRAZOLAM 1 MG/1
TABLET ORAL
Qty: 90 TABLET | Refills: 0 | OUTPATIENT
Start: 2020-01-24 | End: 2020-02-23

## 2020-01-31 ENCOUNTER — OFFICE VISIT (OUTPATIENT)
Dept: FAMILY MEDICINE CLINIC | Age: 55
End: 2020-01-31
Payer: COMMERCIAL

## 2020-01-31 VITALS
HEIGHT: 59 IN | HEART RATE: 78 BPM | SYSTOLIC BLOOD PRESSURE: 124 MMHG | DIASTOLIC BLOOD PRESSURE: 70 MMHG | RESPIRATION RATE: 14 BRPM | BODY MASS INDEX: 27.42 KG/M2 | WEIGHT: 136 LBS

## 2020-01-31 DIAGNOSIS — F41.9 ANXIETY: ICD-10-CM

## 2020-01-31 DIAGNOSIS — M79.10 MYALGIA: ICD-10-CM

## 2020-01-31 DIAGNOSIS — M19.90 ARTHRITIS: ICD-10-CM

## 2020-01-31 DIAGNOSIS — E03.9 ACQUIRED HYPOTHYROIDISM: ICD-10-CM

## 2020-01-31 PROBLEM — R42 DIZZINESS: Status: RESOLVED | Noted: 2017-04-25 | Resolved: 2020-01-31

## 2020-01-31 PROBLEM — R53.1 WEAKNESS: Status: RESOLVED | Noted: 2017-04-25 | Resolved: 2020-01-31

## 2020-01-31 LAB
ALBUMIN SERPL-MCNC: 4.1 G/DL (ref 3.5–4.6)
ALP BLD-CCNC: 94 U/L (ref 40–130)
ALT SERPL-CCNC: 14 U/L (ref 0–33)
ANION GAP SERPL CALCULATED.3IONS-SCNC: 13 MEQ/L (ref 9–15)
AST SERPL-CCNC: 20 U/L (ref 0–35)
BASOPHILS ABSOLUTE: 0 K/UL (ref 0–0.2)
BASOPHILS RELATIVE PERCENT: 0.5 %
BILIRUB SERPL-MCNC: 0.3 MG/DL (ref 0.2–0.7)
BUN BLDV-MCNC: 15 MG/DL (ref 6–20)
CALCIUM SERPL-MCNC: 9.3 MG/DL (ref 8.5–9.9)
CHLORIDE BLD-SCNC: 101 MEQ/L (ref 95–107)
CO2: 25 MEQ/L (ref 20–31)
CREAT SERPL-MCNC: 0.59 MG/DL (ref 0.5–0.9)
EOSINOPHILS ABSOLUTE: 0.1 K/UL (ref 0–0.7)
EOSINOPHILS RELATIVE PERCENT: 1.4 %
GFR AFRICAN AMERICAN: >60
GFR NON-AFRICAN AMERICAN: >60
GLOBULIN: 2.7 G/DL (ref 2.3–3.5)
GLUCOSE BLD-MCNC: 78 MG/DL (ref 70–99)
HCT VFR BLD CALC: 40.3 % (ref 37–47)
HEMOGLOBIN: 13.3 G/DL (ref 12–16)
LYMPHOCYTES ABSOLUTE: 1.4 K/UL (ref 1–4.8)
LYMPHOCYTES RELATIVE PERCENT: 24.8 %
MCH RBC QN AUTO: 29.5 PG (ref 27–31.3)
MCHC RBC AUTO-ENTMCNC: 33.1 % (ref 33–37)
MCV RBC AUTO: 89.1 FL (ref 82–100)
MONOCYTES ABSOLUTE: 0.5 K/UL (ref 0.2–0.8)
MONOCYTES RELATIVE PERCENT: 9 %
NEUTROPHILS ABSOLUTE: 3.7 K/UL (ref 1.4–6.5)
NEUTROPHILS RELATIVE PERCENT: 64.3 %
PDW BLD-RTO: 13.4 % (ref 11.5–14.5)
PLATELET # BLD: 230 K/UL (ref 130–400)
POTASSIUM SERPL-SCNC: 4.2 MEQ/L (ref 3.4–4.9)
RBC # BLD: 4.52 M/UL (ref 4.2–5.4)
RHEUMATOID FACTOR: <10 IU/ML (ref 0–14)
SODIUM BLD-SCNC: 139 MEQ/L (ref 135–144)
T4 FREE: 0.88 NG/DL (ref 0.84–1.68)
TOTAL PROTEIN: 6.8 G/DL (ref 6.3–8)
TSH SERPL DL<=0.05 MIU/L-ACNC: 3.99 UIU/ML (ref 0.44–3.86)
URIC ACID, SERUM: 2.9 MG/DL (ref 2.4–5.7)
WBC # BLD: 5.7 K/UL (ref 4.8–10.8)

## 2020-01-31 PROCEDURE — 99214 OFFICE O/P EST MOD 30 MIN: CPT | Performed by: NURSE PRACTITIONER

## 2020-01-31 RX ORDER — ALPRAZOLAM 1 MG/1
TABLET ORAL
Qty: 90 TABLET | Refills: 0 | Status: SHIPPED | OUTPATIENT
Start: 2020-01-31 | End: 2020-04-21

## 2020-01-31 ASSESSMENT — ENCOUNTER SYMPTOMS
WHEEZING: 0
COUGH: 0
STRIDOR: 0
HEARTBURN: 0
CHOKING: 0
WATER BRASH: 0
ABDOMINAL PAIN: 0
GLOBUS SENSATION: 0
HOARSE VOICE: 0
SORE THROAT: 0
BELCHING: 0
NAUSEA: 0

## 2020-01-31 NOTE — PROGRESS NOTES
Artur Clements MD        Social History     Tobacco Use    Smoking status: Former Smoker     Packs/day: 0.10     Years: 13.00     Pack years: 1.30     Types: Cigarettes     Last attempt to quit: 10/14/2013     Years since quittin.3    Smokeless tobacco: Never Used   Substance Use Topics    Alcohol use: No        Vitals:    20 0828   BP: 124/70   Pulse: 78   Resp: 14   Weight: 136 lb (61.7 kg)   Height: 4' 11\" (1.499 m)     Estimated body mass index is 27.47 kg/m² as calculated from the following:    Height as of this encounter: 4' 11\" (1.499 m). Weight as of this encounter: 136 lb (61.7 kg). Physical Exam   Constitutional: She is oriented to person, place, and time. Vital signs are normal. She appears well-developed and well-nourished. She is cooperative. Non-toxic appearance. She does not have a sickly appearance. She does not appear ill. No distress. HENT:   Head: Normocephalic and atraumatic. Right Ear: Tympanic membrane, external ear and ear canal normal.   Left Ear: Tympanic membrane, external ear and ear canal normal.   Nose: Nose normal.   Mouth/Throat: Oropharynx is clear and moist.   Eyes: Pupils are equal, round, and reactive to light. Conjunctivae, EOM and lids are normal. Right eye exhibits no nystagmus. Left eye exhibits no nystagmus. Neck: Trachea normal and normal range of motion. Neck supple. No JVD present. Carotid bruit is not present. No tracheal deviation present. No thyroid mass and no thyromegaly present. Cardiovascular: Normal rate, regular rhythm, S1 normal, S2 normal, normal heart sounds and intact distal pulses. Exam reveals no gallop. No murmur heard. Pulmonary/Chest: Effort normal and breath sounds normal. No accessory muscle usage. No respiratory distress. She has no decreased breath sounds. She has no wheezes. She has no rhonchi. She has no rales. Abdominal: Soft. Bowel sounds are normal. She exhibits no distension, no ascites and no mass.  There is no prescribed today, as well as treatment plan/ rationale and result expectations have been discussed with the patient who expresses understanding and desires to proceed. Close follow up to evaluate treatment results and for coordination of care. I have reviewed the patient's medical history in detail and updated the computerized patient record. Return in about 3 months (around 7/15/2019). An electronic signature was used to authenticate this note.     --AVLENTIN Davis - CNP on 1/31/2020 at 9:09 AM

## 2020-02-02 LAB
ANA IGG, ELISA: NORMAL
C3 COMPLEMENT: 116 MG/DL (ref 88–201)
C4 COMPLEMENT: 32 MG/DL (ref 10–40)
CCP IGG ANTIBODIES: 8 UNITS (ref 0–19)
RHEUMATOID FACTOR: <10 IU/ML (ref 0–14)

## 2020-02-04 LAB
ANTINUCLEAR AB INTERPRETIVE COMMENT: ABNORMAL
ANTINUCLEAR ANTIBODY, HEP-2, IGG: DETECTED
THYROID PEROXIDASE (TPO) ABS: 152.7 IU/ML (ref 0–9)

## 2020-02-05 LAB
DOUBLE STRANDED DNA AB, IGG: NORMAL
ENA TO RNP ANTIBODY: 0 AU/ML (ref 0–40)
ENA TO SMITH (SM) ANTIBODY: 0 AU/ML (ref 0–40)
ENA TO SSB (LA) ANTIBODY: 0 AU/ML (ref 0–40)
SCLERODERMA (SCL-70) AB: 2 AU/ML (ref 0–40)
SSA 52 (RO) (ENA) AB, IGG: 2 AU/ML (ref 0–40)
SSA 60 (RO) (ENA) AB, IGG: 0 AU/ML (ref 0–40)

## 2020-02-05 RX ORDER — VENLAFAXINE HYDROCHLORIDE 37.5 MG/1
CAPSULE, EXTENDED RELEASE ORAL
Qty: 30 CAPSULE | Refills: 2 | Status: SHIPPED | OUTPATIENT
Start: 2020-02-05 | End: 2021-02-03 | Stop reason: ALTCHOICE

## 2020-02-10 RX ORDER — LEVOTHYROXINE AND LIOTHYRONINE 19; 4.5 UG/1; UG/1
30 TABLET ORAL DAILY
Qty: 30 TABLET | Refills: 3 | Status: SHIPPED | OUTPATIENT
Start: 2020-02-10 | End: 2020-04-21

## 2020-03-22 RX ORDER — AZITHROMYCIN 250 MG/1
250 TABLET, FILM COATED ORAL SEE ADMIN INSTRUCTIONS
Qty: 6 TABLET | Refills: 0 | Status: SHIPPED | OUTPATIENT
Start: 2020-03-22 | End: 2020-03-27

## 2020-03-27 ENCOUNTER — HOSPITAL ENCOUNTER (OUTPATIENT)
Dept: GENERAL RADIOLOGY | Age: 55
Discharge: HOME | End: 2020-03-29
Payer: COMMERCIAL

## 2020-03-27 PROCEDURE — 71046 X-RAY EXAM CHEST 2 VIEWS: CPT

## 2020-04-17 ENCOUNTER — APPOINTMENT (OUTPATIENT)
Dept: GENERAL RADIOLOGY | Age: 55
End: 2020-04-17
Payer: COMMERCIAL

## 2020-04-17 ENCOUNTER — HOSPITAL ENCOUNTER (EMERGENCY)
Age: 55
Discharge: HOME | End: 2020-04-17
Attending: EMERGENCY MEDICINE
Payer: COMMERCIAL

## 2020-04-17 VITALS
TEMPERATURE: 98.8 F | BODY MASS INDEX: 26.21 KG/M2 | WEIGHT: 130 LBS | HEIGHT: 59 IN | RESPIRATION RATE: 19 BRPM | OXYGEN SATURATION: 98 % | SYSTOLIC BLOOD PRESSURE: 139 MMHG | HEART RATE: 96 BPM | DIASTOLIC BLOOD PRESSURE: 93 MMHG

## 2020-04-17 LAB — SARS-COV-2, NAAT: NOT DETECTED

## 2020-04-17 PROCEDURE — 99285 EMERGENCY DEPT VISIT HI MDM: CPT

## 2020-04-17 PROCEDURE — 71045 X-RAY EXAM CHEST 1 VIEW: CPT

## 2020-04-17 PROCEDURE — U0002 COVID-19 LAB TEST NON-CDC: HCPCS

## 2020-04-17 RX ORDER — AZITHROMYCIN 250 MG/1
TABLET, FILM COATED ORAL
Qty: 1 PACKET | Refills: 0 | Status: SHIPPED | OUTPATIENT
Start: 2020-04-17 | End: 2020-04-21

## 2020-04-17 ASSESSMENT — ENCOUNTER SYMPTOMS
VOMITING: 0
DIARRHEA: 0
SORE THROAT: 0
COUGH: 1
ABDOMINAL PAIN: 0
EYE PAIN: 0
SHORTNESS OF BREATH: 1
CHEST TIGHTNESS: 0
NAUSEA: 0

## 2020-04-17 NOTE — ED PROVIDER NOTES
 Lightheadedness 2017    SOB (shortness of breath) 2017    Weakness 2017         SURGICALHISTORY       Past Surgical History:   Procedure Laterality Date    PARTIAL HYSTERECTOMY           CURRENT MEDICATIONS       Previous Medications    ALPRAZOLAM (XANAX) 1 MG TABLET    TAKE ONE TABLET BY MOUTH NIGHTLY AS NEEDED FOR SLEEP FOR UP TO 90 DAYS    DULOXETINE (CYMBALTA) 60 MG EXTENDED RELEASE CAPSULE    Take 1 capsule by mouth daily    FLUTICASONE (ARNUITY ELLIPTA) 100 MCG/ACT AEPB    Inhale 1 puff into the lungs daily    FLUTICASONE (FLONASE) 50 MCG/ACT NASAL SPRAY    1 spray by Each Nostril route daily    FLUTICASONE-UMECLIDIN-VILANT (TRELEGY ELLIPTA) 100-62.5-25 MCG/INH AEPB    Inhale 1 puff into the lungs daily    MONTELUKAST (SINGULAIR) 10 MG TABLET    Take 1 tablet by mouth daily    OMEPRAZOLE (PRILOSEC) 20 MG DELAYED RELEASE CAPSULE    TAKE 1 CAPSULE BY MOUTH ONE TIME A DAY    THYROID (ARMOUR) 30 MG TABLET    Take 1 tablet by mouth daily    VENLAFAXINE (EFFEXOR XR) 37.5 MG EXTENDED RELEASE CAPSULE    TAKE 1 CAPSULE BY MOUTH ONE TIME A DAY       ALLERGIES     Iv contrast [iodides]; Pcn [penicillins]; and Menthol-methyl salicylate    FAMILY HISTORY       Family History   Problem Relation Age of Onset    Hypertension Mother     Thyroid Disease Mother     Diabetes Maternal Grandmother           SOCIAL HISTORY       Social History     Socioeconomic History    Marital status: Single     Spouse name: None    Number of children: None    Years of education: None    Highest education level: None   Occupational History    None   Social Needs    Financial resource strain: None    Food insecurity     Worry: None     Inability: None    Transportation needs     Medical: None     Non-medical: None   Tobacco Use    Smoking status: Former Smoker     Packs/day: 0.10     Years: 13.00     Pack years: 1.30     Types: Cigarettes     Last attempt to quit: 10/14/2013     Years since quittin.5    Smokeless tobacco: Never Used   Substance and Sexual Activity    Alcohol use: No    Drug use: No    Sexual activity: Yes   Lifestyle    Physical activity     Days per week: None     Minutes per session: None    Stress: None   Relationships    Social connections     Talks on phone: None     Gets together: None     Attends Islam service: None     Active member of club or organization: None     Attends meetings of clubs or organizations: None     Relationship status: None    Intimate partner violence     Fear of current or ex partner: None     Emotionally abused: None     Physically abused: None     Forced sexual activity: None   Other Topics Concern    None   Social History Narrative    None       SCREENINGS              PHYSICAL EXAM    (up to 7 for level 4, 8 or more for level 5)     ED Triage Vitals [04/17/20 1000]   BP Temp Temp Source Pulse Resp SpO2 Height Weight   (!) 139/93 98.8 °F (37.1 °C) Oral 96 19 98 % 4' 11\" (1.499 m) 130 lb (59 kg)       Physical Exam  Vitals signs and nursing note reviewed. Constitutional:       General: She is not in acute distress. Appearance: She is well-developed. She is not ill-appearing, toxic-appearing or diaphoretic. HENT:      Head: Normocephalic and atraumatic. Right Ear: External ear normal.      Left Ear: External ear normal.      Mouth/Throat:      Pharynx: No oropharyngeal exudate. Eyes:      Conjunctiva/sclera: Conjunctivae normal.      Pupils: Pupils are equal, round, and reactive to light. Neck:      Musculoskeletal: Normal range of motion and neck supple. Thyroid: No thyromegaly. Vascular: No JVD. Trachea: No tracheal deviation. Cardiovascular:      Rate and Rhythm: Normal rate. Heart sounds: Normal heart sounds. No murmur. Pulmonary:      Effort: Pulmonary effort is normal. No respiratory distress. Breath sounds: Normal breath sounds. No wheezing, rhonchi or rales.    Abdominal:      General: Bowel sounds are

## 2020-04-17 NOTE — ED NOTES
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

## 2020-04-17 NOTE — ED TRIAGE NOTES
Pt presents to ED from home with c/o SOB. During triage pt also admits to dry cough, fever, chills, weakness, h/a, sore throat, nausea, and body aches x2 days. 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 Upon assessment, pt is A/Ox4, skin p/w/d, resp even and unlabored, msp's intact.    Pt denies v/d.

## 2020-04-18 ENCOUNTER — CARE COORDINATION (OUTPATIENT)
Dept: CARE COORDINATION | Age: 55
End: 2020-04-18

## 2020-04-21 ENCOUNTER — CARE COORDINATION (OUTPATIENT)
Dept: CASE MANAGEMENT | Age: 55
End: 2020-04-21

## 2020-04-21 ENCOUNTER — VIRTUAL VISIT (OUTPATIENT)
Dept: FAMILY MEDICINE CLINIC | Age: 55
End: 2020-04-21
Payer: COMMERCIAL

## 2020-04-21 PROCEDURE — 99214 OFFICE O/P EST MOD 30 MIN: CPT | Performed by: NURSE PRACTITIONER

## 2020-04-21 RX ORDER — PREDNISONE 50 MG/1
50 TABLET ORAL DAILY
Qty: 5 TABLET | Refills: 0 | Status: SHIPPED | OUTPATIENT
Start: 2020-04-21 | End: 2020-04-26

## 2020-04-21 RX ORDER — ALPRAZOLAM 1 MG/1
1 TABLET ORAL NIGHTLY PRN
Qty: 30 TABLET | Refills: 2 | Status: SHIPPED | OUTPATIENT
Start: 2020-04-21 | End: 2020-05-21

## 2020-04-21 RX ORDER — LEVOTHYROXINE SODIUM 0.03 MG/1
25 TABLET ORAL DAILY
Qty: 30 TABLET | Refills: 11 | Status: SHIPPED | OUTPATIENT
Start: 2020-04-21 | End: 2021-05-03 | Stop reason: SDUPTHER

## 2020-04-21 RX ORDER — BUSPIRONE HYDROCHLORIDE 7.5 MG/1
7.5 TABLET ORAL 3 TIMES DAILY
Qty: 90 TABLET | Refills: 11 | Status: SHIPPED | OUTPATIENT
Start: 2020-04-21 | End: 2020-05-21

## 2020-04-22 ENCOUNTER — CARE COORDINATION (OUTPATIENT)
Dept: OTHER | Facility: CLINIC | Age: 55
End: 2020-04-22

## 2020-04-22 ENCOUNTER — VIRTUAL VISIT (OUTPATIENT)
Dept: PULMONOLOGY | Age: 55
End: 2020-04-22
Payer: COMMERCIAL

## 2020-04-22 PROCEDURE — 99214 OFFICE O/P EST MOD 30 MIN: CPT | Performed by: INTERNAL MEDICINE

## 2020-04-22 RX ORDER — IPRATROPIUM BROMIDE AND ALBUTEROL SULFATE 2.5; .5 MG/3ML; MG/3ML
1 SOLUTION RESPIRATORY (INHALATION) EVERY 4 HOURS PRN
Qty: 360 ML | Refills: 2 | Status: SHIPPED | OUTPATIENT
Start: 2020-04-22

## 2020-04-22 ASSESSMENT — ENCOUNTER SYMPTOMS
ALLERGIC/IMMUNOLOGIC NEGATIVE: 1
GASTROINTESTINAL NEGATIVE: 1
RESPIRATORY NEGATIVE: 1
EYES NEGATIVE: 1

## 2020-04-22 NOTE — PROGRESS NOTES
2020    TELEHEALTH EVALUATION -- Audio/Visual (During KHXEF-91 public health emergency)    Due to Ilda 19 outbreak, patient's office visit was converted to a virtual visit. Patient was contacted and agreed to proceed with a virtual visit via [employer]. me  The risks and benefits of converting to a virtual visit were discussed in light of the current infectious disease epidemic. Patient also understood that insurance coverage and co-pays are up to their individual insurance plans. HPI:    Pj Chavez (:  1965) has requested an audio/video evaluation for the following concern(s):    Patient presents for asthma follow-up she got another attack recently was in the emergency room over the weekend got tested for coronavirus she was negative, she did have fever over the weekend but now this resolved none since then she does have some weakness and feeling unwell but this could happen with her asthma attacks, she was started on prednisone by her primary, she is compliant with her inhalers, she does have nasal congestion currently on Flonase, heartburn is controlled on PPI, no weight loss, no lower extremity edema, she is sleeping okay, no nausea no vomiting. She is currently at home will be back at work on Friday. Review of Systems   Constitutional: Negative. HENT: Negative. Eyes: Negative. Respiratory: Negative. Gastrointestinal: Negative. Musculoskeletal: Negative. Allergic/Immunologic: Negative. Neurological: Negative. Hematological: Negative. Psychiatric/Behavioral: Negative. Prior to Visit Medications    Medication Sig Taking?  Authorizing Provider   predniSONE (DELTASONE) 50 MG tablet Take 1 tablet by mouth daily for 5 days  VALENTIN Cantor CNP   levothyroxine (SYNTHROID) 25 MCG tablet Take 1 tablet by mouth daily  VALENTIN Cantor CNP   busPIRone (BUSPAR) 7.5 MG tablet Take 1 tablet by mouth 3 times daily  VALENTIN Bolanos CNP Packs/day: 0.10     Years: 13.00     Pack years: 1.30     Types: Cigarettes     Last attempt to quit: 10/14/2013     Years since quittin.5    Smokeless tobacco: Never Used   Substance Use Topics    Alcohol use: No    Drug use: No   ,   Family History   Problem Relation Age of Onset    Hypertension Mother     Thyroid Disease Mother     Diabetes Maternal Grandmother    ,   Immunization History   Administered Date(s) Administered    Influenza Vaccine, unspecified formulation 2016, 10/01/2017    Influenza Virus Vaccine 10/01/2017, 10/04/2018, 10/01/2019, 10/08/2019   ,   Health Maintenance   Topic Date Due    Pneumococcal 0-64 years Vaccine (1 of 1 - PPSV23) 1973    HIV screen  1982    DTaP/Tdap/Td vaccine (1 - Tdap) 1986    Shingles Vaccine (1 of 2) 2017    Cervical cancer screen  2021    Breast cancer screen  2021    Lipid screen  2024    Colon cancer screen colonoscopy  2030    Flu vaccine  Completed    Hepatitis A vaccine  Aged Out    Hepatitis B vaccine  Aged Out    Hib vaccine  Aged Out    Meningococcal (ACWY) vaccine  Aged Out       PHYSICAL EXAMINATION:  [ INSTRUCTIONS:  \"[x]\" Indicates a positive item  \"[]\" Indicates a negative item  -- DELETE ALL ITEMS NOT EXAMINED]  [x] Alert  [x] Oriented to person/place/time    [x] No apparent distress  [] Toxic appearing    [] Face flushed appearing [x] Sclera clear  [] Lips are cyanotic      [x] Breathing appears normal  [] Appears tachypneic      [x] no Rash on visible skin    [x] Cranial Nerves II-XII grossly intact    [x] Motor grossly intact in visible upper extremities    [] Motor grossly intact in visible lower extremities    [x] Normal Mood  [] Anxious appearing    [] Depressed appearing  [] Confused appearing      [] Poor short term memory  [] Poor long term memory    [] OTHER:      Due to this being a TeleHealth encounter, evaluation of the following organ systems is limited: Vitals/Constitutional/EENT/Resp/CV/GI//MS/Neuro/Skin/Heme-Lymph-Imm. ASSESSMENT/PLAN:  1. Severe persistent asthma without complication  · Recurrent exacerbations, Nucala did not start yet, apparently her insurance refused to share information we discussed with Nucala team they did send the patient a letter to call insurance for permission however she did not receive it. We will give the patient information's to get this started. · Continue current taper dose prednisone  · If fever recurs she will call me I would consider retesting for coronavirus then  · She will go back to work on Friday this week to stop by me since we were together I will do chest exam exam then  · Continue Arnuity and trelegy  · Yearly flu shot    2. Gastroesophageal reflux disease without esophagitis  Continue PPI      3. Nasal congestion  Continue Flonase      Return in about 7 weeks (around 6/10/2020). An  electronic signature was used to authenticate this note. --Makenzie Box MD on 4/22/2020 at 1:43 PM        Pursuant to the emergency declaration under the [facility]5 waiver authority and the [employer] Act, this Virtual  Visit was conducted, with patient's consent, to reduce the patient's risk of exposure to COVID-19 and provide continuity of care for an established patient. Services were provided through a video synchronous discussion virtually to substitute for in-person clinic visit.

## 2020-04-24 ENCOUNTER — CARE COORDINATION (OUTPATIENT)
Dept: OTHER | Facility: CLINIC | Age: 55
End: 2020-04-24

## 2020-04-27 ENCOUNTER — CARE COORDINATION (OUTPATIENT)
Dept: OTHER | Facility: CLINIC | Age: 55
End: 2020-04-27

## 2020-04-28 ENCOUNTER — CARE COORDINATION (OUTPATIENT)
Dept: OTHER | Facility: CLINIC | Age: 55
End: 2020-04-28

## 2020-04-29 ASSESSMENT — ENCOUNTER SYMPTOMS
WATER BRASH: 0
GLOBUS SENSATION: 0
SORE THROAT: 0
WHEEZING: 0
HOARSE VOICE: 0
BELCHING: 0
STRIDOR: 0
CHOKING: 0
SHORTNESS OF BREATH: 1
NAUSEA: 0
ABDOMINAL PAIN: 0
HEARTBURN: 0
COUGH: 1

## 2020-04-29 NOTE — PROGRESS NOTES
Medications    Medication Sig Taking? Authorizing Provider   levothyroxine (SYNTHROID) 25 MCG tablet Take 1 tablet by mouth daily Yes VALENTIN Cantor CNP   busPIRone (BUSPAR) 7.5 MG tablet Take 1 tablet by mouth 3 times daily Yes VALENTIN Cantor CNP   ALPRAZolam (XANAX) 1 MG tablet Take 1 tablet by mouth nightly as needed for Sleep or Anxiety for up to 30 days.  Yes VALENTIN Song CNP   ipratropium-albuterol (DUONEB) 0.5-2.5 (3) MG/3ML SOLN nebulizer solution Inhale 3 mLs into the lungs every 4 hours as needed for Shortness of Breath  Alley Hart MD   venlafaxine (EFFEXOR XR) 37.5 MG extended release capsule TAKE 1 CAPSULE BY MOUTH ONE TIME A DAY  VALENTIN Riley CNP   omeprazole (PRILOSEC) 20 MG delayed release capsule TAKE 1 CAPSULE BY MOUTH ONE TIME A DAY  VALENTIN Riley CNP   DULoxetine (CYMBALTA) 60 MG extended release capsule Take 1 capsule by mouth daily  VALENTIN Cantor CNP   fluticasone-umeclidin-vilant (TRELEGY ELLIPTA) 100-62.5-25 MCG/INH AEPB Inhale 1 puff into the lungs daily  Alley Hart MD   montelukast (SINGULAIR) 10 MG tablet Take 1 tablet by mouth daily  Alley Hart MD   fluticasone (FLONASE) 50 MCG/ACT nasal spray 1 spray by Each Nostril route daily  Alley Hart MD   fluticasone (ARNUITY ELLIPTA) 100 MCG/ACT AEPB Inhale 1 puff into the lungs daily  Allye Hart MD       Past Medical History:   Diagnosis Date    Chronic fatigue 4/25/2017    Dizziness 4/25/2017    HTN (hypertension)     Hypothyroidism     Lightheadedness 4/25/2017    SOB (shortness of breath) 4/24/2017    Weakness 4/25/2017     Past Surgical History:   Procedure Laterality Date    PARTIAL HYSTERECTOMY  2009     Social History     Socioeconomic History    Marital status: Single     Spouse name: Not on file    Number of children: Not on file    Years of education: Not on file    Highest education level: Not on file   Occupational History  Not on file   Social Needs    Financial resource strain: Not on file    Food insecurity     Worry: Not on file     Inability: Not on file    Transportation needs     Medical: Not on file     Non-medical: Not on file   Tobacco Use    Smoking status: Former Smoker     Packs/day: 0.10     Years: 13.00     Pack years: 1.30     Types: Cigarettes     Last attempt to quit: 10/14/2013     Years since quittin.5    Smokeless tobacco: Never Used   Substance and Sexual Activity    Alcohol use: No    Drug use: No    Sexual activity: Yes   Lifestyle    Physical activity     Days per week: Not on file     Minutes per session: Not on file    Stress: Not on file   Relationships    Social connections     Talks on phone: Not on file     Gets together: Not on file     Attends Denominational service: Not on file     Active member of club or organization: Not on file     Attends meetings of clubs or organizations: Not on file     Relationship status: Not on file    Intimate partner violence     Fear of current or ex partner: Not on file     Emotionally abused: Not on file     Physically abused: Not on file     Forced sexual activity: Not on file   Other Topics Concern    Not on file   Social History Narrative    Not on file     Family History   Problem Relation Age of Onset    Hypertension Mother     Thyroid Disease Mother     Diabetes Maternal Grandmother      Allergies   Allergen Reactions    Iv Contrast [Iodides] Hives     After injection of isovue 370 75 ml patient developed hives. Two noted total on forehead and right temporal area.  Pcn [Penicillins] Other (See Comments)     syncope  Syncope     Menthol-Methyl Salicylate Rash       PMH, Surgical Hx, Family Hx, and Social Hx reviewed and updated. Health Maintenance reviewed.     PHYSICAL EXAMINATION:  \"[x]\" Indicates a positive item  \"[]\" Indicates a negative item    Vital Signs: (As obtained by patient/caregiver or practitioner observation)    Blood days.    Acquired hypothyroidism    Other orders  -     predniSONE (DELTASONE) 50 MG tablet; Take 1 tablet by mouth daily for 5 days  -     levothyroxine (SYNTHROID) 25 MCG tablet; Take 1 tablet by mouth daily  -     busPIRone (BUSPAR) 7.5 MG tablet; Take 1 tablet by mouth 3 times daily      No orders of the defined types were placed in this encounter. Orders Placed This Encounter   Medications    predniSONE (DELTASONE) 50 MG tablet     Sig: Take 1 tablet by mouth daily for 5 days     Dispense:  5 tablet     Refill:  0    levothyroxine (SYNTHROID) 25 MCG tablet     Sig: Take 1 tablet by mouth daily     Dispense:  30 tablet     Refill:  11    busPIRone (BUSPAR) 7.5 MG tablet     Sig: Take 1 tablet by mouth 3 times daily     Dispense:  90 tablet     Refill:  11    ALPRAZolam (XANAX) 1 MG tablet     Sig: Take 1 tablet by mouth nightly as needed for Sleep or Anxiety for up to 30 days. Dispense:  30 tablet     Refill:  2     Medications Discontinued During This Encounter   Medication Reason    thyroid (ARMOUR) 30 MG tablet LIST CLEANUP    ALPRAZolam (XANAX) 1 MG tablet LIST CLEANUP     Return in about 3 months (around 7/21/2020). Reviewed with the patient: current clinical status, medications, activities and diet. Side effects, adverse effects of the medication prescribed today, as well as treatment plan/ rationale and result expectations have been discussed with the patient who expresses understanding and desires to proceed. Close follow up to evaluate treatment results and for coordination of care. I have reviewed the patient's medical history in detail and updated the computerized patient record. Patient identification was verified at the start of the visit: Yes    Total time spent on this encounter: Not billed by time      --Jada Flores, APRN - CNP on 4/29/2020 at 6:42 PM    An electronic signature was used to authenticate this note.

## 2020-06-10 ENCOUNTER — OFFICE VISIT (OUTPATIENT)
Dept: PULMONOLOGY | Age: 55
End: 2020-06-10
Payer: COMMERCIAL

## 2020-06-10 VITALS
TEMPERATURE: 96.8 F | OXYGEN SATURATION: 100 % | BODY MASS INDEX: 27.34 KG/M2 | WEIGHT: 135.6 LBS | HEIGHT: 59 IN | DIASTOLIC BLOOD PRESSURE: 80 MMHG | SYSTOLIC BLOOD PRESSURE: 138 MMHG | HEART RATE: 75 BPM | RESPIRATION RATE: 14 BRPM

## 2020-06-10 PROCEDURE — 99213 OFFICE O/P EST LOW 20 MIN: CPT | Performed by: INTERNAL MEDICINE

## 2020-06-10 RX ORDER — FLUTICASONE FUROATE 200 UG/1
1 POWDER RESPIRATORY (INHALATION) DAILY
Qty: 30 EACH | Refills: 3 | Status: SHIPPED | OUTPATIENT
Start: 2020-06-10 | End: 2022-05-27 | Stop reason: CLARIF

## 2020-06-10 NOTE — PROGRESS NOTES
Subjective:     Erik Hdz is a 50 y.o. female who complains today of:     Chief Complaint   Patient presents with    Follow-up     ASTHMA       HPI  Patient presents for asthma follow-up    She did not get Keith Tovar yet her insurance is not cooperating with third-party pharmacy to provide the medication. She is doing okay but still gets short of breath, she gets episode of coughing with throat clearing through the day, no heartburn, no nasal congestion postnasal drip, episodes of shortness of breath occurs 2-3 times per day no clear trigger, no lower extremity edema, no fever, weight is stable, she is currently on Trelegy Ellipta and Arnuity, she is also on Singulair. Allergies:   Iv contrast [iodides]; Pcn [penicillins]; and Menthol-methyl salicylate  Past Medical History:   Diagnosis Date    Chronic fatigue 2017    Dizziness 2017    HTN (hypertension)     Hypothyroidism     Lightheadedness 2017    SOB (shortness of breath) 2017    Weakness 2017     Past Surgical History:   Procedure Laterality Date    PARTIAL HYSTERECTOMY       Family History   Problem Relation Age of Onset    Hypertension Mother     Thyroid Disease Mother     Diabetes Maternal Grandmother      Social History     Socioeconomic History    Marital status: Single     Spouse name: Not on file    Number of children: Not on file    Years of education: Not on file    Highest education level: Not on file   Occupational History    Not on file   Social Needs    Financial resource strain: Not on file    Food insecurity     Worry: Not on file     Inability: Not on file    Transportation needs     Medical: Not on file     Non-medical: Not on file   Tobacco Use    Smoking status: Former Smoker     Packs/day: 0.10     Years: 13.00     Pack years: 1.30     Types: Cigarettes     Last attempt to quit: 10/14/2013     Years since quittin.6    Smokeless tobacco: Never Used   Substance and Sexual by mouth daily 30 tablet 3    fluticasone (FLONASE) 50 MCG/ACT nasal spray 1 spray by Each Nostril route daily 1 Bottle 3     No current facility-administered medications for this visit. Objective:     Vitals:    06/10/20 0941   BP: 138/80   Site: Left Upper Arm   Position: Sitting   Pulse: 75   Resp: 14   Temp: 96.8 °F (36 °C)   TempSrc: Temporal   SpO2: 100%   Weight: 135 lb 9.6 oz (61.5 kg)   Height: 4' 11\" (1.499 m)         Physical Exam  Constitutional:       General: She is not in acute distress. Appearance: She is well-developed. She is not diaphoretic. HENT:      Head: Normocephalic and atraumatic. Eyes:      Conjunctiva/sclera: Conjunctivae normal.      Pupils: Pupils are equal, round, and reactive to light. Neck:      Musculoskeletal: Normal range of motion and neck supple. Cardiovascular:      Rate and Rhythm: Normal rate and regular rhythm. Heart sounds: No murmur. No friction rub. No gallop. Pulmonary:      Effort: Pulmonary effort is normal. No respiratory distress. Breath sounds: Normal breath sounds. No wheezing or rales. Chest:      Chest wall: No tenderness. Abdominal:      General: There is no distension. Palpations: Abdomen is soft. Tenderness: There is no abdominal tenderness. There is no rebound. Musculoskeletal:         General: No tenderness. Right lower leg: No edema. Left lower leg: No edema. Lymphadenopathy:      Cervical: No cervical adenopathy. Skin:     General: Skin is warm and dry. Findings: No erythema. Neurological:      Mental Status: She is alert and oriented to person, place, and time. Psychiatric:         Judgment: Judgment normal.         Imaging studies reviewed by me  high-resolution CAT scan 2019 no interstitial lung disease   Lab results reviewed in chart  PFT FEV1 85%      Assessment and Plan       Diagnosis Orders   1.  Severe persistent asthma without complication  fluticasone (ARNUITY ELLIPTA) 200 MCG/ACT AEPB   2. Gastroesophageal reflux disease without esophagitis       · Symptoms are not controlled  ·  will increase Arnuity to 200 daily  · Will try to work with pharmacy to provide Renato Patricia if unable to will consider changing to [place] or Dupixent  · Continue PPI        No orders of the defined types were placed in this encounter. Orders Placed This Encounter   Medications    fluticasone (ARNUITY ELLIPTA) 200 MCG/ACT AEPB     Sig: Inhale 1 Inhaler into the lungs daily     Dispense:  30 each     Refill:  3         Discussed with patient the importance of exercise and weight control and  overall health and well-being. Reviewed with the patient: current clinical status, medications, activities and diet. Side effects, adverse effects of the medication prescribed today, as well as treatment plan and result expectations have been discussed with the patient who expresses understanding and desires to proceed. Return in about 3 months (around 9/10/2020).       Anthony Esquivel MD

## 2020-07-13 ENCOUNTER — OFFICE VISIT (OUTPATIENT)
Dept: FAMILY MEDICINE CLINIC | Age: 55
End: 2020-07-13
Payer: COMMERCIAL

## 2020-07-13 VITALS
RESPIRATION RATE: 12 BRPM | HEART RATE: 80 BPM | WEIGHT: 136 LBS | DIASTOLIC BLOOD PRESSURE: 68 MMHG | SYSTOLIC BLOOD PRESSURE: 116 MMHG | HEIGHT: 59 IN | BODY MASS INDEX: 27.42 KG/M2

## 2020-07-13 DIAGNOSIS — E03.9 ACQUIRED HYPOTHYROIDISM: ICD-10-CM

## 2020-07-13 LAB
ALBUMIN SERPL-MCNC: 4.1 G/DL (ref 3.5–4.6)
ALP BLD-CCNC: 98 U/L (ref 40–130)
ALT SERPL-CCNC: 16 U/L (ref 0–33)
ANION GAP SERPL CALCULATED.3IONS-SCNC: 9 MEQ/L (ref 9–15)
AST SERPL-CCNC: 27 U/L (ref 0–35)
BASOPHILS ABSOLUTE: 0 K/UL (ref 0–0.2)
BASOPHILS RELATIVE PERCENT: 0.5 %
BILIRUB SERPL-MCNC: <0.2 MG/DL (ref 0.2–0.7)
BILIRUBIN, POC: ABNORMAL
BLOOD URINE, POC: ABNORMAL
BUN BLDV-MCNC: 14 MG/DL (ref 6–20)
CALCIUM SERPL-MCNC: 9.6 MG/DL (ref 8.5–9.9)
CHLORIDE BLD-SCNC: 106 MEQ/L (ref 95–107)
CHOLESTEROL, TOTAL: 201 MG/DL (ref 0–199)
CLARITY, POC: CLEAR
CO2: 28 MEQ/L (ref 20–31)
COLOR, POC: YELLOW
CREAT SERPL-MCNC: 0.63 MG/DL (ref 0.5–0.9)
EOSINOPHILS ABSOLUTE: 0.1 K/UL (ref 0–0.7)
EOSINOPHILS RELATIVE PERCENT: 1.4 %
GFR AFRICAN AMERICAN: >60
GFR NON-AFRICAN AMERICAN: >60
GLOBULIN: 2.7 G/DL (ref 2.3–3.5)
GLUCOSE BLD-MCNC: 71 MG/DL (ref 70–99)
GLUCOSE URINE, POC: ABNORMAL
HCT VFR BLD CALC: 40.4 % (ref 37–47)
HDLC SERPL-MCNC: 59 MG/DL (ref 40–59)
HEMOGLOBIN: 13.2 G/DL (ref 12–16)
KETONES, POC: ABNORMAL
LDL CHOLESTEROL CALCULATED: 119 MG/DL (ref 0–129)
LEUKOCYTE EST, POC: ABNORMAL
LYMPHOCYTES ABSOLUTE: 1.4 K/UL (ref 1–4.8)
LYMPHOCYTES RELATIVE PERCENT: 23.8 %
MCH RBC QN AUTO: 29.6 PG (ref 27–31.3)
MCHC RBC AUTO-ENTMCNC: 32.8 % (ref 33–37)
MCV RBC AUTO: 90.3 FL (ref 82–100)
MONOCYTES ABSOLUTE: 0.7 K/UL (ref 0.2–0.8)
MONOCYTES RELATIVE PERCENT: 10.9 %
NEUTROPHILS ABSOLUTE: 3.8 K/UL (ref 1.4–6.5)
NEUTROPHILS RELATIVE PERCENT: 63.4 %
NITRITE, POC: ABNORMAL
PDW BLD-RTO: 13.4 % (ref 11.5–14.5)
PH, POC: 6
PLATELET # BLD: 216 K/UL (ref 130–400)
POTASSIUM SERPL-SCNC: 5 MEQ/L (ref 3.4–4.9)
PROTEIN, POC: ABNORMAL
RBC # BLD: 4.47 M/UL (ref 4.2–5.4)
SODIUM BLD-SCNC: 143 MEQ/L (ref 135–144)
SPECIFIC GRAVITY, POC: 1025
T4 FREE: 1.07 NG/DL (ref 0.84–1.68)
TOTAL PROTEIN: 6.8 G/DL (ref 6.3–8)
TRIGL SERPL-MCNC: 116 MG/DL (ref 0–150)
TSH SERPL DL<=0.05 MIU/L-ACNC: 2.96 UIU/ML (ref 0.44–3.86)
UROBILINOGEN, POC: ABNORMAL
WBC # BLD: 6.1 K/UL (ref 4.8–10.8)

## 2020-07-13 PROCEDURE — 99214 OFFICE O/P EST MOD 30 MIN: CPT | Performed by: NURSE PRACTITIONER

## 2020-07-13 PROCEDURE — 81003 URINALYSIS AUTO W/O SCOPE: CPT | Performed by: NURSE PRACTITIONER

## 2020-07-13 RX ORDER — CIPROFLOXACIN 500 MG/1
500 TABLET, FILM COATED ORAL 2 TIMES DAILY
Qty: 14 TABLET | Refills: 0 | Status: SHIPPED | OUTPATIENT
Start: 2020-07-13 | End: 2020-07-20

## 2020-07-13 ASSESSMENT — ENCOUNTER SYMPTOMS
WHEEZING: 0
GLOBUS SENSATION: 0
STRIDOR: 0
ABDOMINAL PAIN: 0
COUGH: 0
BACK PAIN: 1
SORE THROAT: 0
NAUSEA: 0
BELCHING: 0
HOARSE VOICE: 0
HEARTBURN: 0
CHOKING: 0
WATER BRASH: 0

## 2020-07-13 NOTE — PROGRESS NOTES
2020     Rosita Angelo (:  1965) is a 50 y.o. female, here for evaluation of the following medical concerns:    RLS:  controlled    Hypothyroidism: Patient presents for evaluation of thyroid function. Symptoms consist of denies fatigue, weight changes, heat/cold intolerance, bowel/skin changes or CVS symptoms. The symptoms are none. The problem has been controlled. Previous thyroid studies include TSH. The hypothyroidism is due to hypothyroidism and Hashimoto's disease    Gastroesophageal Reflux   She reports no abdominal pain, no belching, no chest pain, no choking, no coughing, no dysphagia, no early satiety, no globus sensation, no heartburn, no hoarse voice, no nausea, no sore throat, no stridor, no tooth decay, no water brash or no wheezing. This is a new (over the past 3 months) problem. The current episode started more than 1 month ago. The problem occurs constantly (worse at night). The problem has been unchanged. The heartburn duration is several minutes. The heartburn is located in the abdomen and substernum. The heartburn is of moderate intensity. The heartburn wakes her from sleep. The heartburn does not limit her activity. The heartburn changes (worse with laying laying flat ) with position. The symptoms are aggravated by lying down, caffeine and certain foods. Associated symptoms include fatigue. Pertinent negatives include no anemia, melena, muscle weakness, orthopnea or weight loss. There are no known risk factors. She has tried nothing for the symptoms. Past procedures do not include an abdominal ultrasound, an EGD, esophageal manometry, esophageal pH monitoring, H. pylori antibody titer or a UGI. Past invasive treatments do not include gastroplasty, gastroplication or reflux surgery. Urinary Tract Infection    This is a new problem. The current episode started in the past 7 days. The problem has been unchanged. The quality of the pain is described as aching.  The pain is at a Dmitry Jimenez was seen today for 3 month follow-up, hypothyroidism, urinary tract infection and back pain. Diagnoses and all orders for this visit:    Acquired hypothyroidism  -     Lipid Panel; Future  -     Comprehensive Metabolic Panel; Future  -     CBC With Auto Differential; Future  -     TSH Without Reflex; Future  -     T4, Free; Future    Acute cystitis without hematuria  -     POCT Urinalysis No Micro (Auto)  -     ciprofloxacin (CIPRO) 500 MG tablet; Take 1 tablet by mouth 2 times daily for 7 days      Orders Placed This Encounter   Procedures    Lipid Panel     Standing Status:   Future     Standing Expiration Date:   7/13/2021     Order Specific Question:   Is Patient Fasting?/# of Hours     Answer:   9    Comprehensive Metabolic Panel     Standing Status:   Future     Standing Expiration Date:   7/13/2021    CBC With Auto Differential     Standing Status:   Future     Standing Expiration Date:   7/13/2021    TSH Without Reflex     Standing Status:   Future     Standing Expiration Date:   7/13/2021    T4, Free     Standing Status:   Future     Standing Expiration Date:   7/13/2021    POCT Urinalysis No Micro (Auto)     Orders Placed This Encounter   Medications    ciprofloxacin (CIPRO) 500 MG tablet     Sig: Take 1 tablet by mouth 2 times daily for 7 days     Dispense:  14 tablet     Refill:  0     There are no discontinued medications. Return in about 3 months (around 10/13/2020). Reviewed with the patient: current clinical status, medications, activities and diet. Side effects, adverse effects of the medication prescribed today, as well as treatment plan/ rationale and result expectations have been discussed with the patient who expresses understanding and desires to proceed. Close follow up to evaluate treatment results and for coordination of care. I have reviewed the patient's medical history in detail and updated the computerized patient record.     Return in about 3 months (around 7/15/2019). An electronic signature was used to authenticate this note.     --Arvind Almanzar, VALENTIN - CNP on 7/13/2020 at 9:27 AM

## 2020-07-15 PROBLEM — J40 BRONCHITIS: Status: ACTIVE | Noted: 2020-07-15

## 2020-07-31 RX ORDER — DULOXETIN HYDROCHLORIDE 60 MG/1
CAPSULE, DELAYED RELEASE ORAL
Qty: 90 CAPSULE | Refills: 0 | Status: SHIPPED | OUTPATIENT
Start: 2020-07-31 | End: 2020-11-16 | Stop reason: SDUPTHER

## 2020-08-31 ENCOUNTER — TELEPHONE (OUTPATIENT)
Dept: PULMONOLOGY | Age: 55
End: 2020-08-31

## 2020-08-31 RX ORDER — OMEPRAZOLE 20 MG/1
20 CAPSULE, DELAYED RELEASE ORAL DAILY
Qty: 30 CAPSULE | Refills: 2 | Status: SHIPPED | OUTPATIENT
Start: 2020-08-31 | End: 2021-05-20

## 2020-09-21 ENCOUNTER — VIRTUAL VISIT (OUTPATIENT)
Dept: FAMILY MEDICINE CLINIC | Age: 55
End: 2020-09-21
Payer: COMMERCIAL

## 2020-09-21 PROCEDURE — 99213 OFFICE O/P EST LOW 20 MIN: CPT | Performed by: NURSE PRACTITIONER

## 2020-09-21 ASSESSMENT — ENCOUNTER SYMPTOMS
COLOR CHANGE: 0
BACK PAIN: 0

## 2020-09-21 NOTE — PATIENT INSTRUCTIONS
are having problems. It's also a good idea to know your test results and keep a list of the medicines you take. Where can you learn more? Go to [email or web address] and sign in to your [employer] account. Enter K018 in the [facility] box to learn more about \"De Quervain's Disease: Exercises. \"     If you do not have an account, please click on the \"Sign Up Now\" link. Current as of: March 2, 2020               Content Version: 12.5  © 3116-6765 [employer]. Care instructions adapted under license by [facility] ([facility]). If you have questions about a medical condition or this instruction, always ask your healthcare professional. [address] any warranty or liability for your use of this information.

## 2020-09-21 NOTE — PROGRESS NOTES
clubs or organizations: Not on file     Relationship status: Not on file    Intimate partner violence     Fear of current or ex partner: Not on file     Emotionally abused: Not on file     Physically abused: Not on file     Forced sexual activity: Not on file   Other Topics Concern    Not on file   Social History Narrative    Not on file     Current Outpatient Medications on File Prior to Visit   Medication Sig Dispense Refill    omeprazole (PRILOSEC) 20 MG delayed release capsule Take 1 capsule by mouth Daily 30 capsule 2    DULoxetine (CYMBALTA) 60 MG extended release capsule TAKE 1 CAPSULE BY MOUTH ONE TIME A DAY 90 capsule 0    fluticasone (ARNUITY ELLIPTA) 200 MCG/ACT AEPB Inhale 1 Inhaler into the lungs daily 30 each 3    ipratropium-albuterol (DUONEB) 0.5-2.5 (3) MG/3ML SOLN nebulizer solution Inhale 3 mLs into the lungs every 4 hours as needed for Shortness of Breath 360 mL 2    levothyroxine (SYNTHROID) 25 MCG tablet Take 1 tablet by mouth daily 30 tablet 11    venlafaxine (EFFEXOR XR) 37.5 MG extended release capsule TAKE 1 CAPSULE BY MOUTH ONE TIME A DAY 30 capsule 2    fluticasone-umeclidin-vilant (TRELEGY ELLIPTA) 100-62.5-25 MCG/INH AEPB Inhale 1 puff into the lungs daily 1 each 3    montelukast (SINGULAIR) 10 MG tablet Take 1 tablet by mouth daily 30 tablet 3    fluticasone (FLONASE) 50 MCG/ACT nasal spray 1 spray by Each Nostril route daily 1 Bottle 3     No current facility-administered medications on file prior to visit. Allergies: Iv contrast [iodides]; Pcn [penicillins]; and Menthol-methyl salicylate    Review of Systems   Musculoskeletal: Positive for arthralgias, joint swelling and myalgias. Negative for back pain, gait problem, neck pain and neck stiffness. Skin: Negative for color change, pallor, rash and wound. Objective: There were no vitals taken for this visit. Physical Exam  Constitutional:       General: She is awake. She is not in acute distress. Appearance: Normal appearance. She is not ill-appearing or diaphoretic. HENT:      Head: Normocephalic. Right Ear: External ear normal.      Left Ear: External ear normal.      Nose: Nose normal.      Mouth/Throat:      Lips: Pink. Mouth: Mucous membranes are moist.   Pulmonary:      Effort: Pulmonary effort is normal. No respiratory distress. Musculoskeletal:      Left wrist: She exhibits normal range of motion, no tenderness, no bony tenderness and no swelling. Left hand: She exhibits tenderness and bony tenderness. She exhibits normal range of motion, no deformity, no laceration and no swelling. Comments: Negative Tinel's sign  Positive Finkelstein    Skin:     Coloration: Skin is not ashen, cyanotic, jaundiced, mottled, pale or sallow. Neurological:      Mental Status: She is alert and oriented to person, place, and time. Psychiatric:         Mood and Affect: Mood normal.         Speech: Speech normal.         Behavior: Behavior normal. Behavior is cooperative. Assessment:          Diagnosis Orders   1. Tendinitis, de [id number]  [facility]  [facility]   2. Left wrist pain  [facility]   3. Pain of left hand  [address]:      Orders Placed This Encounter   Procedures   [facility]     Referral Priority:   Routine     Referral Type:   Eval and Treat     Referral Reason:   Specialty Services Required     Requested Specialty:   Orthopedic Surgery     Number of Visits Requested:   1        No orders of the defined types were placed in this encounter. Return if symptoms worsen or fail to improve. De quervain's- Discussed that it appeared to be tendonitis. Exercises provided via mychart. Already taking mobic daily so will send to ortho for further evaluation and possible injections.  She was agreeable. Reviewed with the patient: current clinicalstatus, medications, activities and diet. Side effects, adverse effects of the medication prescribedtoday, as well as treatment plan/ rationale and result expectations have been discussedwith the patient who expresses understanding and desires to proceed. Close follow upto evaluate treatment results and for coordination of care. I have reviewedthe patient's medical history in detail and updated the computerized patient record. TELEHEALTH EVALUATION -- Audio/Visual (During FTVPQ-36 public health emergency)    -   Evans Rasheed is a 50 y.o. female being evaluated by a Virtual Visit (video visit) encounter to address concerns as mentioned above. A caregiver was present when appropriate. Due to this being a TeleHealth encounter (During SKAXQ-67 public health emergency), evaluation of the following organ systems was limited: Vitals/Constitutional/EENT/Resp/CV/GI//MS/Neuro/Skin/Heme-Lymph-Imm. Pursuant to the emergency declaration under the [address] [place] [address] authority and the [employer] Act, this Virtual Visit was conducted with patient's (and/or legal guardian's) consent, to reduce the patient's risk of exposure to COVID-19 and provide necessary medical care. The patient (and/or legal guardian) has also been advised to contact this office for worsening conditions or problems, and seek emergency medical treatment and/or call 911 if deemed necessary. Services were provided through a video synchronous discussion virtually to substitute for in-person clinic visit. Type of encounter was X__ Doxy __ MyChart ___Facetime    Patient was located at their home. Provider was located at their _X__ home or        ____ office. --VALENTIN Begum - CNP on 9/21/2020 at 9:43 AM    An electronic signature was used to authenticate this note.

## 2020-10-13 ENCOUNTER — OFFICE VISIT (OUTPATIENT)
Dept: FAMILY MEDICINE CLINIC | Age: 55
End: 2020-10-13
Payer: COMMERCIAL

## 2020-10-13 VITALS
BODY MASS INDEX: 27.62 KG/M2 | HEART RATE: 78 BPM | DIASTOLIC BLOOD PRESSURE: 70 MMHG | RESPIRATION RATE: 15 BRPM | SYSTOLIC BLOOD PRESSURE: 130 MMHG | HEIGHT: 59 IN | WEIGHT: 137 LBS

## 2020-10-13 PROCEDURE — 99214 OFFICE O/P EST MOD 30 MIN: CPT | Performed by: NURSE PRACTITIONER

## 2020-10-13 RX ORDER — ALPRAZOLAM 1 MG/1
TABLET ORAL
COMMUNITY
Start: 2020-09-30 | End: 2020-10-13 | Stop reason: SDUPTHER

## 2020-10-13 RX ORDER — ALPRAZOLAM 1 MG/1
1 TABLET ORAL NIGHTLY PRN
Qty: 90 TABLET | Refills: 1 | Status: SHIPPED | OUTPATIENT
Start: 2020-10-13 | End: 2021-01-11

## 2020-10-19 ENCOUNTER — OFFICE VISIT (OUTPATIENT)
Dept: ORTHOPEDIC SURGERY | Age: 55
End: 2020-10-19
Payer: COMMERCIAL

## 2020-10-19 VITALS
TEMPERATURE: 97 F | HEIGHT: 59 IN | HEART RATE: 84 BPM | WEIGHT: 137 LBS | OXYGEN SATURATION: 97 % | BODY MASS INDEX: 27.62 KG/M2

## 2020-10-19 PROCEDURE — 99203 OFFICE O/P NEW LOW 30 MIN: CPT | Performed by: ORTHOPAEDIC SURGERY

## 2020-10-19 NOTE — PROGRESS NOTES
Subjective:      Patient ID: Leigh Wharton is a 50 y.o. female who presents today for:  Chief Complaint   Patient presents with    Hand Pain     left hand pain x 2 months, pain starts at left Thumb and travels to wrist. pt rates pain 10/10 on pain scale as constant. HPI  Patient has a 2-month history of significant pain on the volar and dorsal aspects of her left thumb. 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 Patient has noted locking of her thumb where she has to crank it open with a loud pop appreciated during this maneuver. Patient is wearing a brace however this is not provided any significant relief. Patient states the pain radiates proximally into the forearm some and distally into the digits. The pain is mild at baseline and moderate when it needs open.     Past Medical History:   Diagnosis Date    Chronic fatigue 2017    Dizziness 2017    HTN (hypertension)     Hypothyroidism     Lightheadedness 2017    SOB (shortness of breath) 2017    Weakness 2017      Past Surgical History:   Procedure Laterality Date    PARTIAL HYSTERECTOMY       Social History     Socioeconomic History    Marital status: Single     Spouse name: Not on file    Number of children: Not on file    Years of education: Not on file    Highest education level: Not on file   Occupational History    Not on file   Social Needs    Financial resource strain: Not on file    Food insecurity     Worry: Not on file     Inability: Not on file    Transportation needs     Medical: Not on file     Non-medical: Not on file   Tobacco Use    Smoking status: Former Smoker     Packs/day: 0.10     Years: 13.00     Pack years: 1.30     Types: Cigarettes     Last attempt to quit: 10/14/2013     Years since quittin.0    Smokeless tobacco: Never Used   Substance and Sexual Activity    Alcohol use: No    Drug use: No    Sexual activity: Yes Lifestyle    Physical activity     Days per week: Not on file     Minutes per session: Not on file    Stress: Not on file   Relationships    Social connections     Talks on phone: Not on file     Gets together: Not on file     Attends Pentecostalism service: Not on file     Active member of club or organization: Not on file     Attends meetings of clubs or organizations: Not on file     Relationship status: Not on file    Intimate partner violence     Fear of current or ex partner: Not on file     Emotionally abused: Not on file     Physically abused: Not on file     Forced sexual activity: Not on file   Other Topics Concern    Not on file   Social History Narrative    Not on file     Family History   Problem Relation Age of Onset    Hypertension Mother     Thyroid Disease Mother     Diabetes Maternal Grandmother      Allergies   Allergen Reactions    Iv Contrast [Iodides] Hives     After injection of isovue 370 75 ml patient developed hives. Two noted total on forehead and right temporal area.  Pcn [Penicillins] Other (See Comments)     syncope  Syncope     Menthol-Methyl Salicylate Rash     Current Outpatient Medications on File Prior to Visit   Medication Sig Dispense Refill    ALPRAZolam (XANAX) 1 MG tablet Take 1 tablet by mouth nightly as needed for Sleep for up to 90 days.  90 tablet 1    omeprazole (PRILOSEC) 20 MG delayed release capsule Take 1 capsule by mouth Daily 30 capsule 2    DULoxetine (CYMBALTA) 60 MG extended release capsule TAKE 1 CAPSULE BY MOUTH ONE TIME A DAY 90 capsule 0    fluticasone (ARNUITY ELLIPTA) 200 MCG/ACT AEPB Inhale 1 Inhaler into the lungs daily 30 each 3    ipratropium-albuterol (DUONEB) 0.5-2.5 (3) MG/3ML SOLN nebulizer solution Inhale 3 mLs into the lungs every 4 hours as needed for Shortness of Breath 360 mL 2    levothyroxine (SYNTHROID) 25 MCG tablet Take 1 tablet by mouth daily 30 tablet 11    venlafaxine (EFFEXOR XR) 37.5 MG extended release capsule TAKE 1 CAPSULE BY MOUTH ONE TIME A DAY 30 capsule 2    fluticasone-umeclidin-vilant (TRELEGY ELLIPTA) 100-62.5-25 MCG/INH AEPB Inhale 1 puff into the lungs daily 1 each 3    montelukast (SINGULAIR) 10 MG tablet Take 1 tablet by mouth daily 30 tablet 3    fluticasone (FLONASE) 50 MCG/ACT nasal spray 1 spray by Each Nostril route daily 1 Bottle 3     No current facility-administered medications on file prior to visit. Review of Systems   General: Denies fever, chills  Cardiovascular: Denies chest pain  Pulmonary: Denies shortness of breath  GI: Denies nausea or vomiting  Neuro: Denies numbness or tingling      Objective:   Pulse 84   Temp 97 °F (36.1 °C) (Temporal)   Ht 4' 11\" (1.499 m)   Wt 137 lb (62.1 kg)   SpO2 97%   BMI 27.67 kg/m²     Ortho Exam   General: Well-appearing female who appears their stated age  Left upper extremity:  Skin: Intact circumferentially, no swelling, ecchymosis or edema is appreciated  Neuro: Sensation intact light touch in the radial, ulnar and median nerve distribution. Able to perform all cardinal hand movements. Vascular: Strong palpable radial pulse, brisk cap refill in all digits. MSK: Patient is tender palpation about the volar aspect of the left thumb about the A1 pulley. A palpable cord is appreciated with pain on moving from flexion to extension with a palpable click. Patient is mild tender palpation about the radial aspect of the dorsum of the wrist.  Patient is otherwise nontender palpation about the remainder of the hand and digits with full flexion extension without clicking appreciated.       Radiographs and Laboratory Studies:     Diagnostic Imaging Studies:    None obtained during this visit    Laboratory Studies:   Lab Results   Component Value Date    WBC 6.1 07/13/2020    HGB 13.2 07/13/2020    HCT 40.4 07/13/2020    MCV 90.3 07/13/2020     07/13/2020     Lab Results   Component Value Date    SEDRATE 8 07/15/2019     Lab Results   Component Value Date    CRP 1.3 07/15/2019       Assessment:      Left trigger thumb     Plan:     Treatment options were discussed with patient regarding her left trigger thumb diagnosis. Patient is already tried modalities including rest anti-inflammatory medications as well as immobilization with a thumb spica brace. All of these have failed to provide prolonged relief. Patient has a triggering digit actively in clinic today which causes painful symptoms. As such recommendation was made for surgical intervention in the form of left trigger thumb release. Risk-benefit surgical invention including but not limited to damage to nerves, tendons, vessels, persistent pain, need for revision surgery, retriggering of digit, irritation of the radial branch of the digital nerve to the thumb were all discussed the patient with goals of surgery being to alleviate triggering and allow for better range of motion with decreased pain at the thumb. She was understanding of these risks and benefits and is agreed to proceed. No orders of the defined types were placed in this encounter. No orders of the defined types were placed in this encounter. No follow-ups on file.       Jose Alejo MD              Surgery Phone: [phone]   [facility]   Surgery Fax: [phone]    Phone: [phone]   PAT Fax: [phone]    Fax: [phone]    Orthopedics: Surgery Scheduling, PAT & PRE-OP Order Form  Call to advance Garvin at [phone] at least 24 hours prior to date of service     Surgery Location: [facility] Surgery: [address], [address] [facility]  Estella Prado MD Surgery Date: 2020  Time: 12:30 pm   Patient's Name: Marko Jones : 1965    Gender: female  Home Phone:  [phone] Cell Phone: [phone]  Emergency Contact:  Rosa Blunt   Phone: [phone]  Payor: Kandy Hathaway /  /  /    ID No.: [id number]      PROVIDER TO COMPLETE:  Diagnosis: Left Trigger Thumb  Procedure/Consent: Left Trigger thumb release  Case Comments/Implants: Hand tray   Surgery Scheduled as: Outpatient  Anesthesia Requested: Regional Block  Referring Family Doctor: VALENTIN Bautista CNP  [x] [facility] PAT Date/Time:                                                            [x] History & Physical [] Physician will Provide [] Attached [] Dictated [] Other  [x] Follow Anesthesia Pre-Op Orders for X-rays, [facility]     [x] SN & PT to evaluate and treat/educate disease management, medications, home safety & equipment needs for total joint patients  [] Other: ____________________________________________________  Consults: Medical/Cardiac Clearance done by  ____________________  PRE-OP ORDERS:   Allergies:  Iv contrast [iodides]; Pcn [penicillins]; and Menthol-methyl salicylate Latex Allergies:             Diabetic:           [] IV ________________________  [x] IV Start with J-loop     Preprinted Orders: Attached [] Yes [] No   ANTIBIOTIC PRE-OP: [x] ANCEF 2 gram IVPB if > 120 kg 3 grams IVPB within 1 hour of incision, if ALLERGIC, use VANCOMYCIN 1 gram IV, 2 hours pre-op  [] TXA Protocol [] Other:   [x] NPO   [] Betablocker (if needed) _____________________________________   [] Knee high anti-embolic hose [] Thigh high anti-embolic hose   Other: ______________________________________________________    Physician Signature Required: May Gottron   Date/Time: 10/19/2020

## 2020-10-26 ENCOUNTER — NURSE ONLY (OUTPATIENT)
Dept: PRIMARY CARE CLINIC | Age: 55
End: 2020-10-26

## 2020-10-26 ENCOUNTER — HOSPITAL ENCOUNTER (OUTPATIENT)
Dept: PREADMISSION TESTING | Age: 55
Discharge: HOME | End: 2020-10-30
Payer: COMMERCIAL

## 2020-10-26 VITALS
WEIGHT: 137 LBS | HEIGHT: 60 IN | OXYGEN SATURATION: 98 % | SYSTOLIC BLOOD PRESSURE: 134 MMHG | TEMPERATURE: 97.1 F | RESPIRATION RATE: 16 BRPM | HEART RATE: 65 BPM | DIASTOLIC BLOOD PRESSURE: 84 MMHG | BODY MASS INDEX: 26.9 KG/M2

## 2020-10-26 PROBLEM — M65.312 TRIGGER FINGER OF LEFT THUMB: Status: ACTIVE | Noted: 2020-10-26

## 2020-10-26 LAB
EKG ATRIAL RATE: 59 BPM
EKG P AXIS: 1 DEGREES
EKG P-R INTERVAL: 122 MS
EKG Q-T INTERVAL: 418 MS
EKG QRS DURATION: 80 MS
EKG QTC CALCULATION (BAZETT): 413 MS
EKG R AXIS: 21 DEGREES
EKG T AXIS: 27 DEGREES
EKG VENTRICULAR RATE: 59 BPM
HCT VFR BLD CALC: 36.8 % (ref 37–47)
HEMOGLOBIN: 12.1 G/DL (ref 12–16)
MCH RBC QN AUTO: 29.2 PG (ref 27–31.3)
MCHC RBC AUTO-ENTMCNC: 33 % (ref 33–37)
MCV RBC AUTO: 88.5 FL (ref 82–100)
PDW BLD-RTO: 13.2 % (ref 11.5–14.5)
PLATELET # BLD: 227 K/UL (ref 130–400)
RBC # BLD: 4.15 M/UL (ref 4.2–5.4)
TSH SERPL DL<=0.05 MIU/L-ACNC: 3.35 UIU/ML (ref 0.44–3.86)
WBC # BLD: 7.4 K/UL (ref 4.8–10.8)

## 2020-10-26 PROCEDURE — 85027 COMPLETE CBC AUTOMATED: CPT

## 2020-10-26 PROCEDURE — 84443 ASSAY THYROID STIM HORMONE: CPT

## 2020-10-26 PROCEDURE — U0003 INFECTIOUS AGENT DETECTION BY NUCLEIC ACID (DNA OR RNA); SEVERE ACUTE RESPIRATORY SYNDROME CORONAVIRUS 2 (SARS-COV-2) (CORONAVIRUS DISEASE [COVID-19]), AMPLIFIED PROBE TECHNIQUE, MAKING USE OF HIGH THROUGHPUT TECHNOLOGIES AS DESCRIBED BY CMS-2020-01-R: HCPCS

## 2020-10-26 PROCEDURE — 93005 ELECTROCARDIOGRAM TRACING: CPT | Performed by: NURSE PRACTITIONER

## 2020-10-26 RX ORDER — SODIUM CHLORIDE, SODIUM LACTATE, POTASSIUM CHLORIDE, CALCIUM CHLORIDE 600; 310; 30; 20 MG/100ML; MG/100ML; MG/100ML; MG/100ML
INJECTION, SOLUTION INTRAVENOUS CONTINUOUS
Status: CANCELLED | OUTPATIENT
Start: 2020-11-02

## 2020-10-26 RX ORDER — LIDOCAINE HYDROCHLORIDE 10 MG/ML
1 INJECTION, SOLUTION EPIDURAL; INFILTRATION; INTRACAUDAL; PERINEURAL
Status: CANCELLED | OUTPATIENT
Start: 2020-11-02 | End: 2020-11-02

## 2020-10-26 RX ORDER — SODIUM CHLORIDE 0.9 % (FLUSH) 0.9 %
10 SYRINGE (ML) INJECTION EVERY 12 HOURS SCHEDULED
Status: CANCELLED | OUTPATIENT
Start: 2020-11-02

## 2020-10-26 RX ORDER — CEFAZOLIN SODIUM 2 G/50ML
2 SOLUTION INTRAVENOUS ONCE
Status: CANCELLED | OUTPATIENT
Start: 2020-11-02

## 2020-10-26 RX ORDER — SODIUM CHLORIDE 0.9 % (FLUSH) 0.9 %
10 SYRINGE (ML) INJECTION PRN
Status: CANCELLED | OUTPATIENT
Start: 2020-11-02

## 2020-10-26 ASSESSMENT — ENCOUNTER SYMPTOMS
GLOBUS SENSATION: 0
BACK PAIN: 1
STRIDOR: 0
HOARSE VOICE: 0
WATER BRASH: 0
ABDOMINAL PAIN: 0
NAUSEA: 0
TROUBLE SWALLOWING: 0
CHEST TIGHTNESS: 0
COUGH: 0
WHEEZING: 0
EYES NEGATIVE: 1
ALLERGIC/IMMUNOLOGIC NEGATIVE: 1
CONSTIPATION: 0
WHEEZING: 0
SORE THROAT: 0
VOMITING: 0
ABDOMINAL PAIN: 0
SHORTNESS OF BREATH: 0
CHOKING: 0
COUGH: 0
DIARRHEA: 0
BELCHING: 0
STRIDOR: 0
HEARTBURN: 0
SORE THROAT: 0
BACK PAIN: 1
NAUSEA: 0

## 2020-10-26 NOTE — H&P (VIEW-ONLY)
Nurse Practitioner History and Physical      CHIEF COMPLAINT:  Left thumb surgery    HISTORY OF PRESENT ILLNESS:      The patient is a 50 y.o. female with significant past medical history of left trigger thumb who presents for left trigger thumb release. Sx > 2 months -- onset gradual & progressively worsening causing difficulty with ADL. C/o left thumb pain& locking --manipulates thumb to unlock & a loud pop occurs. Left thumb pain radiates into left hand & left digits as well as left forearm. Has tried treating sx with anti-inflammatories& immobilization of thumb -- neither has given patient any prolonged pain relief. Scheduled for OR. Past Medical History:        Diagnosis Date    Arthritis     Asthma     Chronic fatigue 4/25/2017    Dizziness 4/25/2017    Hypertension     past trx x 1 yr -- off meds > 4 yrs    Hypothyroidism     meds > 3 yrs -- intermittently trx    Lightheadedness 4/25/2017    SOB (shortness of breath) 4/24/2017    Weakness 4/25/2017     Past Surgical History:    Past Surgical History:   Procedure Laterality Date    HYSTERECTOMY  2007    preseve ovaries    PARTIAL HYSTERECTOMY  2009         Medications Prior to Admission:    Current Outpatient Medications   Medication Sig Dispense Refill    ALPRAZolam (XANAX) 1 MG tablet Take 1 tablet by mouth nightly as needed for Sleep for up to 90 days.  90 tablet 1    omeprazole (PRILOSEC) 20 MG delayed release capsule Take 1 capsule by mouth Daily 30 capsule 2    DULoxetine (CYMBALTA) 60 MG extended release capsule TAKE 1 CAPSULE BY MOUTH ONE TIME A DAY 90 capsule 0    fluticasone (ARNUITY ELLIPTA) 200 MCG/ACT AEPB Inhale 1 Inhaler into the lungs daily 30 each 3    levothyroxine (SYNTHROID) 25 MCG tablet Take 1 tablet by mouth daily 30 tablet 11    ipratropium-albuterol (DUONEB) 0.5-2.5 (3) MG/3ML SOLN nebulizer solution Inhale 3 mLs into the lungs every 4 hours as needed for Shortness of Breath 360 mL 2    venlafaxine (EFFEXOR XR) 37.5 MG extended release capsule TAKE 1 CAPSULE BY MOUTH ONE TIME A DAY 30 capsule 2    fluticasone-umeclidin-vilant (TRELEGY ELLIPTA) 100-62.5-25 MCG/INH AEPB Inhale 1 puff into the lungs daily 1 each 3    montelukast (SINGULAIR) 10 MG tablet Take 1 tablet by mouth daily 30 tablet 3    fluticasone (FLONASE) 50 MCG/ACT nasal spray 1 spray by Each Nostril route daily 1 Bottle 3     No current facility-administered medications for this encounter. Allergies:   Iv contrast [iodides]; Pcn [penicillins]; and Menthol-methyl salicylate    Social History:   Social History     Socioeconomic History    Marital status: Single     Spouse name: Not on file    Number of children: Not on file    Years of education: Not on file    Highest education level: Not on file   Occupational History    Not on file   Social Needs    Financial resource strain: Not on file    Food insecurity     Worry: Not on file     Inability: Not on file    Transportation needs     Medical: Not on file     Non-medical: Not on file   Tobacco Use    Smoking status: Former Smoker     Packs/day: 0.10     Years: 13.00     Pack years: 1.30     Types: Cigarettes     Last attempt to quit: 10/14/2013     Years since quittin.0    Smokeless tobacco: Never Used   Substance and Sexual Activity    Alcohol use: No    Drug use: No    Sexual activity: Yes   Lifestyle    Physical activity     Days per week: Not on file     Minutes per session: Not on file    Stress: Not on file   Relationships    Social connections     Talks on phone: Not on file     Gets together: Not on file     Attends Evangelical service: Not on file     Active member of club or organization: Not on file     Attends meetings of clubs or organizations: Not on file     Relationship status: Not on file    Intimate partner violence     Fear of current or ex partner: Not on file     Emotionally abused: Not on file     Physically abused: Not on file     Forced sexual activity: Not on file Other Topics Concern    Not on file   Social History Narrative    Not on file       Family History:       Problem Relation Age of Onset    Hypertension Mother     Thyroid Disease Mother     High Blood Pressure Mother     Diabetes Maternal Grandmother     Breast Cancer Sister     Heart Attack Brother          at age 62       Review of Systems   Constitutional: Negative. Negative for chills and fever. HENT: Positive for congestion (sinus). Negative for postnasal drip, sore throat, tinnitus and trouble swallowing. Eyes: Negative. Negative for visual disturbance. Respiratory: Negative for cough, chest tightness, shortness of breath, wheezing and stridor. Cardiovascular: Negative for chest pain and palpitations. Gastrointestinal: Negative for abdominal pain, constipation, diarrhea, nausea and vomiting. Genitourinary: Negative for dysuria and frequency. Musculoskeletal: Positive for back pain. Negative for myalgias and neck pain. Left thumb trigger finger. Skin: Negative. Allergic/Immunologic: Negative. Neurological: Negative. Negative for seizures and headaches. Hematological: Negative. Psychiatric/Behavioral: Negative. Vitals:  /84   Pulse 65   Temp 97.1 °F (36.2 °C) (Temporal)   Resp 16   Ht 4' 11.5\" (1.511 m)   Wt 137 lb (62.1 kg)   SpO2 98%   BMI 27.21 kg/m²     Physical Exam  Constitutional:       Appearance: She is well-developed. HENT:      Head: Normocephalic and atraumatic. Right Ear: Tympanic membrane, ear canal and external ear normal.      Left Ear: Tympanic membrane and external ear normal.      Nose: No congestion. Mouth/Throat:      Mouth: Mucous membranes are moist.      Comments: Full upper & lower dentures. Eyes:      General: No scleral icterus. Extraocular Movements: Extraocular movements intact. Conjunctiva/sclera: Conjunctivae normal.      Pupils: Pupils are equal, round, and reactive to light.    Neck: Musculoskeletal: Normal range of motion. Thyroid: No thyromegaly. Vascular: No JVD. Trachea: No tracheal deviation. Comments: Anterior fullness. Cardiovascular:      Rate and Rhythm: Normal rate and regular rhythm. Heart sounds: Normal heart sounds. No murmur. No gallop. Pulmonary:      Effort: Pulmonary effort is normal. No respiratory distress. Breath sounds: Normal breath sounds. No wheezing or rales. Abdominal:      General: Bowel sounds are normal.      Palpations: Abdomen is soft. Tenderness: There is no abdominal tenderness. Comments: Transverse pelvic OR scar. Genitourinary:     Comments: Deferred. Musculoskeletal: Normal range of motion. General: Tenderness (left thumb with clicking) present. Right lower leg: No edema. Left lower leg: No edema. Lymphadenopathy:      Cervical: No cervical adenopathy. Skin:     General: Skin is warm and dry. Findings: No erythema. Neurological:      Mental Status: She is alert and oriented to person, place, and time. Gait: Gait normal.   Psychiatric:         Mood and Affect: Mood normal.         Behavior: Behavior normal.         Thought Content: Thought content normal.         Judgment: Judgment normal.         [unfilled]    Assessment:  Patient Active Problem List   Diagnosis    Elbow pain    Backhand tennis elbow    SOB (shortness of breath)    Chronic fatigue    Bronchitis    Trigger finger of left thumb         Plan:  Scheduled for left trigger thumb release.     Claudean Drop, APRN - CNP  10/26/2020  2:35 PM

## 2020-10-26 NOTE — H&P
Nurse Practitioner History and Physical      CHIEF COMPLAINT:  Left thumb surgery    HISTORY OF PRESENT ILLNESS:      The patient is a 50 y.o. female with significant past medical history of left trigger thumb who presents for left trigger thumb release. Sx > 2 months -- onset gradual & progressively worsening causing difficulty with ADL. C/o left thumb pain& locking --manipulates thumb to unlock & a loud pop occurs. Left thumb pain radiates into left hand & left digits as well as left forearm. Has tried treating sx with anti-inflammatories& immobilization of thumb -- neither has given patient any prolonged pain relief. Scheduled for OR. Past Medical History:        Diagnosis Date    Arthritis     Asthma     Chronic fatigue 4/25/2017    Dizziness 4/25/2017    Hypertension     past trx x 1 yr -- off meds > 4 yrs    Hypothyroidism     meds > 3 yrs -- intermittently trx    Lightheadedness 4/25/2017    SOB (shortness of breath) 4/24/2017    Weakness 4/25/2017     Past Surgical History:    Past Surgical History:   Procedure Laterality Date    HYSTERECTOMY  2007    preseve ovaries    PARTIAL HYSTERECTOMY  2009         Medications Prior to Admission:    Current Outpatient Medications   Medication Sig Dispense Refill    ALPRAZolam (XANAX) 1 MG tablet Take 1 tablet by mouth nightly as needed for Sleep for up to 90 days.  90 tablet 1    omeprazole (PRILOSEC) 20 MG delayed release capsule Take 1 capsule by mouth Daily 30 capsule 2    DULoxetine (CYMBALTA) 60 MG extended release capsule TAKE 1 CAPSULE BY MOUTH ONE TIME A DAY 90 capsule 0    fluticasone (ARNUITY ELLIPTA) 200 MCG/ACT AEPB Inhale 1 Inhaler into the lungs daily 30 each 3    levothyroxine (SYNTHROID) 25 MCG tablet Take 1 tablet by mouth daily 30 tablet 11    ipratropium-albuterol (DUONEB) 0.5-2.5 (3) MG/3ML SOLN nebulizer solution Inhale 3 mLs into the lungs every 4 hours as needed for Shortness of Breath 360 mL 2    venlafaxine (EFFEXOR XR) 37.5 MG extended release capsule TAKE 1 CAPSULE BY MOUTH ONE TIME A DAY 30 capsule 2    fluticasone-umeclidin-vilant (TRELEGY ELLIPTA) 100-62.5-25 MCG/INH AEPB Inhale 1 puff into the lungs daily 1 each 3    montelukast (SINGULAIR) 10 MG tablet Take 1 tablet by mouth daily 30 tablet 3    fluticasone (FLONASE) 50 MCG/ACT nasal spray 1 spray by Each Nostril route daily 1 Bottle 3     No current facility-administered medications for this encounter. Allergies:   Iv contrast [iodides]; Pcn [penicillins]; and Menthol-methyl salicylate    Social History:   Social History     Socioeconomic History    Marital status: Single     Spouse name: Not on file    Number of children: Not on file    Years of education: Not on file    Highest education level: Not on file   Occupational History    Not on file   Social Needs    Financial resource strain: Not on file    Food insecurity     Worry: Not on file     Inability: Not on file    Transportation needs     Medical: Not on file     Non-medical: Not on file   Tobacco Use    Smoking status: Former Smoker     Packs/day: 0.10     Years: 13.00     Pack years: 1.30     Types: Cigarettes     Last attempt to quit: 10/14/2013     Years since quittin.0    Smokeless tobacco: Never Used   Substance and Sexual Activity    Alcohol use: No    Drug use: No    Sexual activity: Yes   Lifestyle    Physical activity     Days per week: Not on file     Minutes per session: Not on file    Stress: Not on file   Relationships    Social connections     Talks on phone: Not on file     Gets together: Not on file     Attends Taoism service: Not on file     Active member of club or organization: Not on file     Attends meetings of clubs or organizations: Not on file     Relationship status: Not on file    Intimate partner violence     Fear of current or ex partner: Not on file     Emotionally abused: Not on file     Physically abused: Not on file     Forced sexual activity: Not on file Other Topics Concern    Not on file   Social History Narrative    Not on file       Family History:       Problem Relation Age of Onset    Hypertension Mother     Thyroid Disease Mother     High Blood Pressure Mother     Diabetes Maternal Grandmother     Breast Cancer Sister     Heart Attack Brother          at age 62       Review of Systems   Constitutional: Negative. Negative for chills and fever. HENT: Positive for congestion (sinus). Negative for postnasal drip, sore throat, tinnitus and trouble swallowing. Eyes: Negative. Negative for visual disturbance. Respiratory: Negative for cough, chest tightness, shortness of breath, wheezing and stridor. Cardiovascular: Negative for chest pain and palpitations. Gastrointestinal: Negative for abdominal pain, constipation, diarrhea, nausea and vomiting. Genitourinary: Negative for dysuria and frequency. Musculoskeletal: Positive for back pain. Negative for myalgias and neck pain. Left thumb trigger finger. Skin: Negative. Allergic/Immunologic: Negative. Neurological: Negative. Negative for seizures and headaches. Hematological: Negative. Psychiatric/Behavioral: Negative. Vitals:  /84   Pulse 65   Temp 97.1 °F (36.2 °C) (Temporal)   Resp 16   Ht 4' 11.5\" (1.511 m)   Wt 137 lb (62.1 kg)   SpO2 98%   BMI 27.21 kg/m²     Physical Exam  Constitutional:       Appearance: She is well-developed. HENT:      Head: Normocephalic and atraumatic. Right Ear: Tympanic membrane, ear canal and external ear normal.      Left Ear: Tympanic membrane and external ear normal.      Nose: No congestion. Mouth/Throat:      Mouth: Mucous membranes are moist.      Comments: Full upper & lower dentures. Eyes:      General: No scleral icterus. Extraocular Movements: Extraocular movements intact. Conjunctiva/sclera: Conjunctivae normal.      Pupils: Pupils are equal, round, and reactive to light.    Neck: Musculoskeletal: Normal range of motion. Thyroid: No thyromegaly. Vascular: No JVD. Trachea: No tracheal deviation. Comments: Anterior fullness. Cardiovascular:      Rate and Rhythm: Normal rate and regular rhythm. Heart sounds: Normal heart sounds. No murmur. No gallop. Pulmonary:      Effort: Pulmonary effort is normal. No respiratory distress. Breath sounds: Normal breath sounds. No wheezing or rales. Abdominal:      General: Bowel sounds are normal.      Palpations: Abdomen is soft. Tenderness: There is no abdominal tenderness. Comments: Transverse pelvic OR scar. Genitourinary:     Comments: Deferred. Musculoskeletal: Normal range of motion. General: Tenderness (left thumb with clicking) present. Right lower leg: No edema. Left lower leg: No edema. Lymphadenopathy:      Cervical: No cervical adenopathy. Skin:     General: Skin is warm and dry. Findings: No erythema. Neurological:      Mental Status: She is alert and oriented to person, place, and time. Gait: Gait normal.   Psychiatric:         Mood and Affect: Mood normal.         Behavior: Behavior normal.         Thought Content: Thought content normal.         Judgment: Judgment normal.         [unfilled]    Assessment:  Patient Active Problem List   Diagnosis    Elbow pain    Backhand tennis elbow    SOB (shortness of breath)    Chronic fatigue    Bronchitis    Trigger finger of left thumb         Plan:  Scheduled for left trigger thumb release.     VALENTIN Paige CNP  10/26/2020  2:35 PM

## 2020-10-28 ENCOUNTER — TELEPHONE (OUTPATIENT)
Dept: ORTHOPEDIC SURGERY | Age: 55
End: 2020-10-28

## 2020-10-28 LAB
SARS-COV-2: NOT DETECTED
SOURCE: NORMAL

## 2020-10-28 PROCEDURE — 93010 ELECTROCARDIOGRAM REPORT: CPT | Performed by: INTERNAL MEDICINE

## 2020-10-28 NOTE — TELEPHONE ENCOUNTER
She cannot have 2 operations in 2 different hands in the same day. Shot in her other nonsurgical hand is a valid option, I will leave that decision making up to Dr. Mikey Gale.

## 2020-10-28 NOTE — TELEPHONE ENCOUNTER
Pt called asking if she can also have her right thumb operated on the same time she has her left done, on Monday. Pt reported that \"it is acting up. \" Pt also asking if she cannot have it operated on, if she is able to get a shot in the right hand at the same time she is having her operation. Please advise.

## 2020-10-30 ENCOUNTER — ANESTHESIA EVENT (OUTPATIENT)
Dept: OPERATING ROOM | Age: 55
End: 2020-10-30
Payer: COMMERCIAL

## 2020-10-31 NOTE — ANESTHESIA PRE PROCEDURE
Department of Anesthesiology  Preprocedure Note       Name:  Victoria Sever   Age:  50 y.o.  :  1965                                          MRN:  [id number]         Date:  10/31/2020      Surgeon: Linda García):  Genoveva Douglas MD    Procedure: Procedure(s):  LEFT TRIGGER THUMB RELEASE. HAND TRAY    Medications prior to admission:   Prior to Admission medications    Medication Sig Start Date End Date Taking? Authorizing Provider   ALPRAZolam Ramu Nasuti) 1 MG tablet Take 1 tablet by mouth nightly as needed for Sleep for up to 90 days. 10/13/20 1/11/21  VALENTIN Cantor CNP   omeprazole (PRILOSEC) 20 MG delayed release capsule Take 1 capsule by mouth Daily 20   VALENTIN Cantor CNP   DULoxetine (CYMBALTA) 60 MG extended release capsule TAKE 1 CAPSULE BY MOUTH ONE TIME A DAY 20   VALENTIN Cantor CNP   fluticasone (ARNUITY ELLIPTA) 200 MCG/ACT AEPB Inhale 1 Inhaler into the lungs daily 6/10/20   Chris Mario MD   ipratropium-albuterol (DUONEB) 0.5-2.5 (3) MG/3ML SOLN nebulizer solution Inhale 3 mLs into the lungs every 4 hours as needed for Shortness of Breath 20   Chris Mario MD   levothyroxine (SYNTHROID) 25 MCG tablet Take 1 tablet by mouth daily 20   VALENTIN Cantor CNP   venlafaxine (EFFEXOR XR) 37.5 MG extended release capsule TAKE 1 CAPSULE BY MOUTH ONE TIME A DAY 20   Adjondi Sheree Aschoff, APRN - CNP   fluticasone-umeclidin-vilant (TRELEGY ELLIPTA) 100-62.5-25 MCG/INH AEPB Inhale 1 puff into the lungs daily 19   Chris Mario MD   montelukast (SINGULAIR) 10 MG tablet Take 1 tablet by mouth daily 19   Chris Mario MD   fluticasone (FLONASE) 50 MCG/ACT nasal spray 1 spray by Each Nostril route daily 19   Chris Mario MD       Current medications:    No current facility-administered medications for this encounter.       Current Outpatient Medications   Medication Sig Dispense Refill    ALPRAZolam (XANAX) 1 MG tablet Surgical History:        Procedure Laterality Date    HYSTERECTOMY  2007    preseve ovaries    PARTIAL HYSTERECTOMY  2009       Social History:    Social History     Tobacco Use    Smoking status: Former Smoker     Packs/day: 0.10     Years: 13.00     Pack years: 1.30     Types: Cigarettes     Last attempt to quit: 10/14/2013     Years since quittin.0    Smokeless tobacco: Never Used   Substance Use Topics    Alcohol use: No                                Counseling given: Not Answered      Vital Signs (Current): There were no vitals filed for this visit. BP Readings from Last 3 Encounters:   10/26/20 134/84   10/13/20 130/70   20 116/68       NPO Status:                                                                                 BMI:   Wt Readings from Last 3 Encounters:   10/26/20 137 lb (62.1 kg)   10/19/20 137 lb (62.1 kg)   10/13/20 137 lb (62.1 kg)     There is no height or weight on file to calculate BMI.    CBC:   Lab Results   Component Value Date    WBC 7.4 10/26/2020    RBC 4.15 10/26/2020    HGB 12.1 10/26/2020    HCT 36.8 10/26/2020    MCV 88.5 10/26/2020    RDW 13.2 10/26/2020     10/26/2020       CMP:   Lab Results   Component Value Date     2020    K 5.0 2020     2020    CO2 28 2020    BUN 14 2020    CREATININE 0.63 2020    GFRAA >60.0 2020    LABGLOM >60.0 2020    GLUCOSE 71 2020    PROT 6.8 2020    CALCIUM 9.6 2020    BILITOT <0.2 2020    ALKPHOS 98 2020    AST 27 2020    ALT 16 2020       POC Tests: No results for input(s): POCGLU, POCNA, POCK, POCCL, POCBUN, POCHEMO, POCHCT in the last 72 hours.     Coags:   Lab Results   Component Value Date    PROTIME 10.2 2014    INR 1.0 2014    APTT 27.2 2014       HCG (If Applicable): No results found for: PREGTESTUR, PREGSERUM, HCG, HCGQUANT     ABGs: No results found for: PHART, PO2ART, UST3FDB, YXR4UGO, BEART, M0FXTJBA     Type & Screen (If Applicable):  No results found for: LABABO, LABRH    Drug/Infectious Status (If Applicable):  No results found for: HIV, HEPCAB    COVID-19 Screening (If Applicable):   Lab Results   Component Value Date    COVID19 Not Detected 10/26/2020         Anesthesia Evaluation  Patient summary reviewed and Nursing notes reviewed no history of anesthetic complications:   Airway: Mallampati: II  TM distance: >3 FB   Neck ROM: full  Mouth opening: > = 3 FB Dental: normal exam         Pulmonary:Negative Pulmonary ROS and normal exam    (+) asthma:                            Cardiovascular:Negative CV ROS  Exercise tolerance: good (>4 METS),   (+) hypertension:,       ECG reviewed      Echocardiogram reviewed  Stress test reviewed       Beta Blocker:  Not on Beta Blocker         Neuro/Psych:   Negative Neuro/Psych ROS              GI/Hepatic/Renal: Neg GI/Hepatic/Renal ROS            Endo/Other: Negative Endo/Other ROS   (+) hypothyroidism::., .          Pt had PAT visit. Abdominal:           Vascular: negative vascular ROS. Anesthesia Plan      [place] block     ASA 2       Induction: intravenous. MIPS: Prophylactic antiemetics administered. Anesthetic plan and risks discussed with patient.       Plan discussed with surgical team.                Shawna Ta MD   10/31/2020

## 2020-11-01 ENCOUNTER — PATIENT MESSAGE (OUTPATIENT)
Dept: FAMILY MEDICINE CLINIC | Age: 55
End: 2020-11-01

## 2020-11-02 ENCOUNTER — HOSPITAL ENCOUNTER (OUTPATIENT)
Age: 55
Setting detail: OUTPATIENT SURGERY
Discharge: HOME | End: 2020-11-02
Attending: ORTHOPAEDIC SURGERY | Admitting: ORTHOPAEDIC SURGERY
Payer: COMMERCIAL

## 2020-11-02 ENCOUNTER — ANESTHESIA (OUTPATIENT)
Dept: OPERATING ROOM | Age: 55
End: 2020-11-02
Payer: COMMERCIAL

## 2020-11-02 VITALS
BODY MASS INDEX: 27.21 KG/M2 | TEMPERATURE: 97.6 F | OXYGEN SATURATION: 99 % | WEIGHT: 135 LBS | SYSTOLIC BLOOD PRESSURE: 144 MMHG | HEIGHT: 59 IN | HEART RATE: 72 BPM | RESPIRATION RATE: 16 BRPM | DIASTOLIC BLOOD PRESSURE: 80 MMHG

## 2020-11-02 VITALS
SYSTOLIC BLOOD PRESSURE: 160 MMHG | DIASTOLIC BLOOD PRESSURE: 90 MMHG | OXYGEN SATURATION: 100 % | RESPIRATION RATE: 16 BRPM

## 2020-11-02 LAB — SARS-COV-2, NAAT: NOT DETECTED

## 2020-11-02 PROCEDURE — 3600000013 HC SURGERY LEVEL 3 ADDTL 15MIN: Performed by: ORTHOPAEDIC SURGERY

## 2020-11-02 PROCEDURE — 2709999900 HC NON-CHARGEABLE SUPPLY: Performed by: ORTHOPAEDIC SURGERY

## 2020-11-02 PROCEDURE — 6360000002 HC RX W HCPCS: Performed by: ORTHOPAEDIC SURGERY

## 2020-11-02 PROCEDURE — 6360000002 HC RX W HCPCS: Performed by: ANESTHESIOLOGY

## 2020-11-02 PROCEDURE — 2500000003 HC RX 250 WO HCPCS: Performed by: ANESTHESIOLOGY

## 2020-11-02 PROCEDURE — 7100000011 HC PHASE II RECOVERY - ADDTL 15 MIN: Performed by: ORTHOPAEDIC SURGERY

## 2020-11-02 PROCEDURE — 26055 INCISE FINGER TENDON SHEATH: CPT | Performed by: ORTHOPAEDIC SURGERY

## 2020-11-02 PROCEDURE — 7100000010 HC PHASE II RECOVERY - FIRST 15 MIN: Performed by: ORTHOPAEDIC SURGERY

## 2020-11-02 PROCEDURE — 2580000003 HC RX 258: Performed by: ORTHOPAEDIC SURGERY

## 2020-11-02 PROCEDURE — 6370000000 HC RX 637 (ALT 250 FOR IP): Performed by: ANESTHESIOLOGY

## 2020-11-02 PROCEDURE — 3700000000 HC ANESTHESIA ATTENDED CARE: Performed by: ORTHOPAEDIC SURGERY

## 2020-11-02 PROCEDURE — 3600000003 HC SURGERY LEVEL 3 BASE: Performed by: ORTHOPAEDIC SURGERY

## 2020-11-02 PROCEDURE — 3700000001 HC ADD 15 MINUTES (ANESTHESIA): Performed by: ORTHOPAEDIC SURGERY

## 2020-11-02 PROCEDURE — U0002 COVID-19 LAB TEST NON-CDC: HCPCS

## 2020-11-02 RX ORDER — PROPOFOL 10 MG/ML
INJECTION, EMULSION INTRAVENOUS PRN
Status: DISCONTINUED | OUTPATIENT
Start: 2020-11-02 | End: 2020-11-02 | Stop reason: SDUPTHER

## 2020-11-02 RX ORDER — MELOXICAM 15 MG/1
TABLET ORAL
Qty: 90 TABLET | Refills: 0 | Status: SHIPPED | OUTPATIENT
Start: 2020-11-02 | End: 2021-08-31 | Stop reason: SDUPTHER

## 2020-11-02 RX ORDER — FENTANYL CITRATE 50 UG/ML
25 INJECTION, SOLUTION INTRAMUSCULAR; INTRAVENOUS EVERY 5 MIN PRN
Status: DISCONTINUED | OUTPATIENT
Start: 2020-11-02 | End: 2020-11-02 | Stop reason: HOSPADM

## 2020-11-02 RX ORDER — HYDROCODONE BITARTRATE AND ACETAMINOPHEN 5; 325 MG/1; MG/1
1 TABLET ORAL PRN
Status: COMPLETED | OUTPATIENT
Start: 2020-11-02 | End: 2020-11-02

## 2020-11-02 RX ORDER — LIDOCAINE HYDROCHLORIDE 5 MG/ML
INJECTION, SOLUTION INFILTRATION; INTRAVENOUS PRN
Status: DISCONTINUED | OUTPATIENT
Start: 2020-11-02 | End: 2020-11-02 | Stop reason: SDUPTHER

## 2020-11-02 RX ORDER — SODIUM CHLORIDE, SODIUM LACTATE, POTASSIUM CHLORIDE, CALCIUM CHLORIDE 600; 310; 30; 20 MG/100ML; MG/100ML; MG/100ML; MG/100ML
INJECTION, SOLUTION INTRAVENOUS CONTINUOUS
Status: DISCONTINUED | OUTPATIENT
Start: 2020-11-02 | End: 2020-11-02 | Stop reason: HOSPADM

## 2020-11-02 RX ORDER — MEPERIDINE HYDROCHLORIDE 25 MG/ML
12.5 INJECTION INTRAMUSCULAR; INTRAVENOUS; SUBCUTANEOUS EVERY 5 MIN PRN
Status: DISCONTINUED | OUTPATIENT
Start: 2020-11-02 | End: 2020-11-02 | Stop reason: HOSPADM

## 2020-11-02 RX ORDER — ONDANSETRON 2 MG/ML
4 INJECTION INTRAMUSCULAR; INTRAVENOUS
Status: DISCONTINUED | OUTPATIENT
Start: 2020-11-02 | End: 2020-11-02 | Stop reason: HOSPADM

## 2020-11-02 RX ORDER — LABETALOL HYDROCHLORIDE 5 MG/ML
5 INJECTION, SOLUTION INTRAVENOUS EVERY 10 MIN PRN
Status: DISCONTINUED | OUTPATIENT
Start: 2020-11-02 | End: 2020-11-02 | Stop reason: HOSPADM

## 2020-11-02 RX ORDER — DIPHENHYDRAMINE HYDROCHLORIDE 50 MG/ML
12.5 INJECTION INTRAMUSCULAR; INTRAVENOUS
Status: DISCONTINUED | OUTPATIENT
Start: 2020-11-02 | End: 2020-11-02 | Stop reason: HOSPADM

## 2020-11-02 RX ORDER — MORPHINE SULFATE 2 MG/ML
1 INJECTION, SOLUTION INTRAMUSCULAR; INTRAVENOUS EVERY 5 MIN PRN
Status: DISCONTINUED | OUTPATIENT
Start: 2020-11-02 | End: 2020-11-02 | Stop reason: HOSPADM

## 2020-11-02 RX ORDER — MIDAZOLAM HYDROCHLORIDE 1 MG/ML
INJECTION INTRAMUSCULAR; INTRAVENOUS PRN
Status: DISCONTINUED | OUTPATIENT
Start: 2020-11-02 | End: 2020-11-02 | Stop reason: SDUPTHER

## 2020-11-02 RX ORDER — HYDROCODONE BITARTRATE AND ACETAMINOPHEN 5; 325 MG/1; MG/1
2 TABLET ORAL PRN
Status: COMPLETED | OUTPATIENT
Start: 2020-11-02 | End: 2020-11-02

## 2020-11-02 RX ORDER — PROPOFOL 10 MG/ML
INJECTION, EMULSION INTRAVENOUS CONTINUOUS PRN
Status: DISCONTINUED | OUTPATIENT
Start: 2020-11-02 | End: 2020-11-02 | Stop reason: SDUPTHER

## 2020-11-02 RX ORDER — 0.9 % SODIUM CHLORIDE 0.9 %
500 INTRAVENOUS SOLUTION INTRAVENOUS
Status: DISCONTINUED | OUTPATIENT
Start: 2020-11-02 | End: 2020-11-02 | Stop reason: HOSPADM

## 2020-11-02 RX ORDER — METOCLOPRAMIDE HYDROCHLORIDE 5 MG/ML
10 INJECTION INTRAMUSCULAR; INTRAVENOUS
Status: DISCONTINUED | OUTPATIENT
Start: 2020-11-02 | End: 2020-11-02 | Stop reason: HOSPADM

## 2020-11-02 RX ORDER — HYDROCODONE BITARTRATE AND ACETAMINOPHEN 5; 325 MG/1; MG/1
1 TABLET ORAL EVERY 4 HOURS PRN
Qty: 20 TABLET | Refills: 0 | Status: SHIPPED | OUTPATIENT
Start: 2020-11-02 | End: 2020-11-09

## 2020-11-02 RX ORDER — CEFAZOLIN SODIUM 2 G/50ML
2 SOLUTION INTRAVENOUS
Status: COMPLETED | OUTPATIENT
Start: 2020-11-02 | End: 2020-11-02

## 2020-11-02 RX ORDER — MAGNESIUM HYDROXIDE 1200 MG/15ML
LIQUID ORAL CONTINUOUS PRN
Status: COMPLETED | OUTPATIENT
Start: 2020-11-02 | End: 2020-11-02

## 2020-11-02 RX ADMIN — CEFAZOLIN SODIUM 2 G: 2 SOLUTION INTRAVENOUS at 08:49

## 2020-11-02 RX ADMIN — HYDROCODONE BITARTRATE AND ACETAMINOPHEN 2 TABLET: 5; 325 TABLET ORAL at 09:27

## 2020-11-02 RX ADMIN — MIDAZOLAM HYDROCHLORIDE 2 MG: 2 INJECTION, SOLUTION INTRAMUSCULAR; INTRAVENOUS at 08:44

## 2020-11-02 RX ADMIN — PROPOFOL 40 MG: 10 INJECTION, EMULSION INTRAVENOUS at 08:47

## 2020-11-02 RX ADMIN — LIDOCAINE HYDROCHLORIDE 40 ML: 5 INJECTION, SOLUTION INFILTRATION at 08:51

## 2020-11-02 RX ADMIN — PROPOFOL 100 MCG/KG/MIN: 10 INJECTION, EMULSION INTRAVENOUS at 08:51

## 2020-11-02 RX ADMIN — SODIUM CHLORIDE, POTASSIUM CHLORIDE, SODIUM LACTATE AND CALCIUM CHLORIDE: 600; 310; 30; 20 INJECTION, SOLUTION INTRAVENOUS at 06:36

## 2020-11-02 ASSESSMENT — PULMONARY FUNCTION TESTS
PIF_VALUE: 0
PIF_VALUE: 0
PIF_VALUE: 1
PIF_VALUE: 0
PIF_VALUE: 1
PIF_VALUE: 0

## 2020-11-02 ASSESSMENT — PAIN DESCRIPTION - PAIN TYPE
TYPE: SURGICAL PAIN

## 2020-11-02 ASSESSMENT — PAIN SCALES - GENERAL
PAINLEVEL_OUTOF10: 10
PAINLEVEL_OUTOF10: 10
PAINLEVEL_OUTOF10: 7
PAINLEVEL_OUTOF10: 10
PAINLEVEL_OUTOF10: 7
PAINLEVEL_OUTOF10: 9

## 2020-11-02 ASSESSMENT — PAIN DESCRIPTION - LOCATION: LOCATION: HAND

## 2020-11-02 ASSESSMENT — PAIN - FUNCTIONAL ASSESSMENT: PAIN_FUNCTIONAL_ASSESSMENT: 0-10

## 2020-11-02 NOTE — ANESTHESIA PROCEDURE NOTES
Peripheral Block    Patient location during procedure: pre-op  Start time: 11/2/2020 8:50 AM  End time: 11/2/2020 9:00 AM  Staffing  Anesthesiologist: Nubia Moore MD  Performed: anesthesiologist   Preanesthetic Checklist  Completed: patient identified, site marked, surgical consent, pre-op evaluation, timeout performed, IV checked, risks and benefits discussed, monitors and equipment checked, anesthesia consent given, oxygen available and patient being monitored  Peripheral Block  Patient position: supine  Prep: ChloraPrep  Patient monitoring: cardiac monitor, continuous pulse ox, frequent blood pressure checks and IV access  Block type: Marni block  Laterality: left  Injection technique: single-shot  Procedures: ultrasound guided and nerve stimulator  Local infiltration: lidocaine  Infiltration strength: 0.5 %  Dose: 40 mL  Provider prep: mask (Sterile probe cover)  Local infiltration: lidocaine  Assessment  Injection assessment: no paresthesia on injection  Hemodynamics: stable  Additional Notes        Reason for block: primary anesthetic

## 2020-11-02 NOTE — TELEPHONE ENCOUNTER
From: Darlene Pate  To: VALENTIN Wheeler - CNP  Sent: 11/1/2020 7:40 AM EST  Subject: Prescription Question    Hello heather I need a refill on meloxicam 15 mg please.  Thank you have a good day

## 2020-11-02 NOTE — PROGRESS NOTES
Pt resting on cart with eyes closed. Awakened and states pain is somewhat better. States about 9/10.

## 2020-11-02 NOTE — ANESTHESIA POSTPROCEDURE EVALUATION
Department of Anesthesiology  Postprocedure Note    Patient: Josie Joseph  MRN: [id number]  YOB: 1965  Date of evaluation: 11/2/2020  Time:  9:21 AM     Procedure Summary     Date:  11/02/20 Room / Location:  [address]    Anesthesia Start:  0622 Anesthesia Stop:  5014    Procedure:  LEFT TRIGGER THUMB RELEASE. HAND TRAY (Left ) Diagnosis:  (LEFT TRIGGER THUMB)    Surgeon:  Vinay Long MD Responsible Provider:  Gissell Davis MD    Anesthesia Type:  Marni block ASA Status:  2          Anesthesia Type: [address] block    Hitesh Phase I: Hitesh Score: 10    Hitesh Phase II:      Last vitals: Reviewed and per EMR flowsheets.        Anesthesia Post Evaluation    Patient location during evaluation: bedside  Patient participation: complete - patient participated  Level of consciousness: awake and awake and alert  Pain score: 2  Airway patency: patent  Nausea & Vomiting: no nausea and no vomiting  Complications: no  Cardiovascular status: blood pressure returned to baseline and hemodynamically stable  Respiratory status: acceptable  Hydration status: euvolemic calm

## 2020-11-02 NOTE — OP NOTE
Operative Note      Patient: Marlene Foss  YOB: 1965  MRN: [id number]    Date of Procedure: 11/2/2020    Pre-Op Diagnosis: LEFT TRIGGER THUMB    Post-Op Diagnosis: Same       Procedure(s):  LEFT TRIGGER THUMB RELEASE. HAND TRAY    Surgeon(s):  Laine Akhtar MD    Assistant:   * No surgical staff found *    Anesthesia: Regional    Estimated Blood Loss (mL): Minimal    Complications: None    Specimens:   * No specimens in log *    Implants:  * No implants in log *      Drains: * No LDAs found *    Findings: Left trigger thumb    Detailed Description of Procedure: The left upper extremity was prepped and draped as typical for extremity procedure. A timeout was performed, all parties were identified, and the correct surgical site was noted. At the conclusion of the timeout the location A1 pulley on the left thumb was palpated, its location consistent with the proximal flexor crease of the thumb. Using a transverse incision along the flexor crease on the ulnar side of the digit small careful incisions were made through skin. After incision was carried through skin blunt dissection was performed in the subcutaneous fat which identified the digital nerve branches. These were subsequently then protected throughout the duration of the case. Additional blunt dissection was carried down to the A1 pulley. This was directly visualized and again with all neurovascular structures protected on either side. The A1 pulley was incised in a longitudinal fashion in the flexor tendon was freed of adhesions. Wound was copiously irrigated with normal saline and skin was closed with 4-0 nylon sutures.     Electronically signed by Laine Akhtar MD on 11/2/2020 at 9:13 AM

## 2020-11-16 ENCOUNTER — OFFICE VISIT (OUTPATIENT)
Dept: ORTHOPEDIC SURGERY | Age: 55
End: 2020-11-16
Payer: COMMERCIAL

## 2020-11-16 ENCOUNTER — PATIENT MESSAGE (OUTPATIENT)
Dept: FAMILY MEDICINE CLINIC | Age: 55
End: 2020-11-16

## 2020-11-16 VITALS
HEART RATE: 76 BPM | OXYGEN SATURATION: 97 % | BODY MASS INDEX: 27.21 KG/M2 | WEIGHT: 135 LBS | TEMPERATURE: 96.6 F | HEIGHT: 59 IN

## 2020-11-16 PROCEDURE — 20550 NJX 1 TENDON SHEATH/LIGAMENT: CPT | Performed by: ORTHOPAEDIC SURGERY

## 2020-11-16 RX ORDER — DULOXETIN HYDROCHLORIDE 60 MG/1
CAPSULE, DELAYED RELEASE ORAL
Qty: 90 CAPSULE | Refills: 0 | Status: SHIPPED | OUTPATIENT
Start: 2020-11-16 | End: 2021-02-26

## 2020-11-16 RX ORDER — METHYLPREDNISOLONE ACETATE 80 MG/ML
40 INJECTION, SUSPENSION INTRA-ARTICULAR; INTRALESIONAL; INTRAMUSCULAR; SOFT TISSUE ONCE
Status: COMPLETED | OUTPATIENT
Start: 2020-11-16 | End: 2020-11-16

## 2020-11-16 RX ORDER — LIDOCAINE HYDROCHLORIDE 10 MG/ML
1.5 INJECTION, SOLUTION INFILTRATION; PERINEURAL ONCE
Status: COMPLETED | OUTPATIENT
Start: 2020-11-16 | End: 2020-11-16

## 2020-11-16 RX ADMIN — METHYLPREDNISOLONE ACETATE 40 MG: 80 INJECTION, SUSPENSION INTRA-ARTICULAR; INTRALESIONAL; INTRAMUSCULAR; SOFT TISSUE at 09:42

## 2020-11-16 RX ADMIN — LIDOCAINE HYDROCHLORIDE 1.5 ML: 10 INJECTION, SOLUTION INFILTRATION; PERINEURAL at 09:41

## 2020-11-16 NOTE — PROGRESS NOTES
Subjective:      Patient ID: Dhara Hearn is a 50 y.o. female who presents today for:  Chief Complaint   Patient presents with    Post-Op Check     surgery date 2020 for LEFT TRIGGER THUMB RELEASE, pt states        HPI  Patient here today for follow-up status post on left trigger thumb release. She is 2 weeks out. She is doing well. She is not noticed any clicking or catching since the surgery date. Admits to some pain persistently in the base of the palm along the thenar musculature. She also admits to pain in her right thumb again on the volar aspect and acknowledges clicking and catching consistent with triggering of this digit as well. Past Medical History:   Diagnosis Date    Arthritis     Asthma     Chronic fatigue 2017    Dizziness 2017    Hypertension     past trx x 1 yr -- off meds > 4 yrs    Hypothyroidism     meds > 3 yrs -- intermittently trx    Lightheadedness 2017    SOB (shortness of breath) 2017    Weakness 2017      Past Surgical History:   Procedure Laterality Date    FINGER TRIGGER RELEASE Left 2020    LEFT TRIGGER THUMB RELEASE.  HAND TRAY performed by Uzair Coats MD at [address] [place] [address]      preseve ovaries    PARTIAL HYSTERECTOMY       Social History     Socioeconomic History    Marital status: Single     Spouse name: Not on file    Number of children: Not on file    Years of education: Not on file    Highest education level: Not on file   Occupational History    Not on file   Social Needs    Financial resource strain: Not on file    Food insecurity     Worry: Not on file     Inability: Not on file    Transportation needs     Medical: Not on file     Non-medical: Not on file   Tobacco Use    Smoking status: Former Smoker     Packs/day: 0.10     Years: 13.00     Pack years: 1.30     Types: Cigarettes     Last attempt to quit: 10/14/2013     Years since quittin.0    Smokeless tobacco: Never Used Substance and Sexual Activity    Alcohol use: No    Drug use: No    Sexual activity: Yes   Lifestyle    Physical activity     Days per week: Not on file     Minutes per session: Not on file    Stress: Not on file   Relationships    Social connections     Talks on phone: Not on file     Gets together: Not on file     Attends Religion service: Not on file     Active member of club or organization: Not on file     Attends meetings of clubs or organizations: Not on file     Relationship status: Not on file    Intimate partner violence     Fear of current or ex partner: Not on file     Emotionally abused: Not on file     Physically abused: Not on file     Forced sexual activity: Not on file   Other Topics Concern    Not on file   Social History Narrative    Not on file     Family History   Problem Relation Age of Onset    Hypertension Mother     Thyroid Disease Mother     High Blood Pressure Mother     Diabetes Maternal Grandmother     Breast Cancer Sister     Heart Attack Brother          at age 62     Allergies   Allergen Reactions    Iv Contrast [Iodides] Hives     After injection of isovue 370 75 ml patient developed hives. Two noted total on forehead and right temporal area.  Pcn [Penicillins] Other (See Comments)     syncope  Syncope     Menthol-Methyl Salicylate Rash     Current Outpatient Medications on File Prior to Visit   Medication Sig Dispense Refill    meloxicam (MOBIC) 15 MG tablet TAKE 1 TABLET BY MOUTH EVERY DAY AFTER A MEAL UNTIL RELIEF 90 tablet 0    ALPRAZolam (XANAX) 1 MG tablet Take 1 tablet by mouth nightly as needed for Sleep for up to 90 days.  90 tablet 1    omeprazole (PRILOSEC) 20 MG delayed release capsule Take 1 capsule by mouth Daily 30 capsule 2    DULoxetine (CYMBALTA) 60 MG extended release capsule TAKE 1 CAPSULE BY MOUTH ONE TIME A DAY 90 capsule 0    fluticasone (ARNUITY ELLIPTA) 200 MCG/ACT AEPB Inhale 1 Inhaler into the lungs daily 30 each 3    ipratropium-albuterol (DUONEB) 0.5-2.5 (3) MG/3ML SOLN nebulizer solution Inhale 3 mLs into the lungs every 4 hours as needed for Shortness of Breath 360 mL 2    levothyroxine (SYNTHROID) 25 MCG tablet Take 1 tablet by mouth daily 30 tablet 11    venlafaxine (EFFEXOR XR) 37.5 MG extended release capsule TAKE 1 CAPSULE BY MOUTH ONE TIME A DAY 30 capsule 2    fluticasone-umeclidin-vilant (TRELEGY ELLIPTA) 100-62.5-25 MCG/INH AEPB Inhale 1 puff into the lungs daily 1 each 3    montelukast (SINGULAIR) 10 MG tablet Take 1 tablet by mouth daily 30 tablet 3    fluticasone (FLONASE) 50 MCG/ACT nasal spray 1 spray by Each Nostril route daily 1 Bottle 3     No current facility-administered medications on file prior to visit. Review of Systems   General: Denies fever, chills  Cardiovascular: Denies chest pain  Pulmonary: Denies shortness of breath  GI: Denies nausea or vomiting  Neuro: Denies numbness or tingling      Objective:   Pulse 76   Temp 96.6 °F (35.9 °C) (Temporal)   Ht 4' 11\" (1.499 m)   Wt 135 lb (61.2 kg)   SpO2 97%   BMI 27.27 kg/m²     Ortho Exam  General: Well-appearing female who appears their stated age  Left upper extremity:  Skin: Patient with well-healed surgical incision along the base of the volar aspect of the left thumb. No surrounding erythema or drainage is appreciated. Neuro: Sensation intact light touch in the radial, ulnar and median nerve distribution. Able to perform all cardinal hand movements. Typically sensation intact light touch in the thumb throughout  Vascular: Strong palpable radial pulse, brisk cap refill in all digits. MSK: Patient tolerates gentle range of motion of the thumb with no clicking or catching appreciated.   Mildly tender palpation about the base of the thenar musculature on the left side    Right upper extremity:  Skin: Intact circumferentially, no swelling, ecchymosis or edema is appreciated  Neuro: Sensation intact light touch in the radial, ulnar and median nerve distribution. Able to perform all cardinal hand movements. Vascular: Strong palpable radial pulse, brisk cap refill in all digits. MSK: Patient with noted clicking and catching appreciated about the volar aspect of the right thumb. Patient is tender palpation about the A1 pulley of the right thumb. Radiographs and Laboratory Studies:     Diagnostic Imaging Studies:    No new imaging obtained    Laboratory Studies:   Lab Results   Component Value Date    WBC 7.4 10/26/2020    HGB 12.1 10/26/2020    HCT 36.8 (L) 10/26/2020    MCV 88.5 10/26/2020     10/26/2020     Lab Results   Component Value Date    SEDRATE 8 07/15/2019     Lab Results   Component Value Date    CRP 1.3 07/15/2019     Procedure note:  Using clean technique, the bony landmarks of the right hand were palpated. The A1 pulley on the volar aspect of the right thumb was palpated and the skin was cleaned with iodine swabs. Injection using a 25-gauge needle with 2.0 cc of 1% lidocaine without epinephrine  methylprednisolone 40 mg was used. Hemostasis was achieved and a Band-Aid was applied cleanly. Patient tolerated procedure well and remained at neurovascular baseline distally after injection. Assessment:       Diagnosis Orders   1. Trigger thumb, right thumb  OH INJECT TENDON SHEATH/LIGAMENT          Plan:     Patient doing very well regarding trigger thumb release on the left hand. No further restrictions from my standpoint okay for showering and gradual return to full activity. Regarding her right thumb she underwent trigger thumb injection on this side. Did state that we would have to wait 3 months prior to any surgical intervention the right thumb given injection at this site. Can see patient back on an as-needed basis.     Orders Placed This Encounter   Procedures    OH INJECT TENDON SHEATH/LIGAMENT     Orders Placed This Encounter   Medications    methylPREDNISolone acetate (DEPO-MEDROL) injection 40 mg    lidocaine 1 % injection 1.5 mL       No follow-ups on file.       Mark Medina MD

## 2021-01-12 ENCOUNTER — OFFICE VISIT (OUTPATIENT)
Dept: FAMILY MEDICINE CLINIC | Age: 56
End: 2021-01-12
Payer: COMMERCIAL

## 2021-01-12 VITALS
BODY MASS INDEX: 27.62 KG/M2 | HEART RATE: 78 BPM | DIASTOLIC BLOOD PRESSURE: 70 MMHG | RESPIRATION RATE: 15 BRPM | WEIGHT: 137 LBS | HEIGHT: 59 IN | SYSTOLIC BLOOD PRESSURE: 124 MMHG

## 2021-01-12 DIAGNOSIS — Z20.822 CLOSE EXPOSURE TO COVID-19 VIRUS: ICD-10-CM

## 2021-01-12 DIAGNOSIS — R53.82 CHRONIC FATIGUE: ICD-10-CM

## 2021-01-12 DIAGNOSIS — F51.01 PRIMARY INSOMNIA: ICD-10-CM

## 2021-01-12 DIAGNOSIS — E04.9 ENLARGED THYROID: ICD-10-CM

## 2021-01-12 DIAGNOSIS — Z12.31 ENCOUNTER FOR SCREENING MAMMOGRAM FOR MALIGNANT NEOPLASM OF BREAST: Primary | ICD-10-CM

## 2021-01-12 DIAGNOSIS — F41.9 ANXIETY: ICD-10-CM

## 2021-01-12 DIAGNOSIS — E03.9 ACQUIRED HYPOTHYROIDISM: ICD-10-CM

## 2021-01-12 DIAGNOSIS — N30.00 ACUTE CYSTITIS WITHOUT HEMATURIA: ICD-10-CM

## 2021-01-12 LAB
ALBUMIN SERPL-MCNC: 4 G/DL (ref 3.5–4.6)
ALP BLD-CCNC: 128 U/L (ref 40–130)
ALT SERPL-CCNC: 29 U/L (ref 0–33)
ANION GAP SERPL CALCULATED.3IONS-SCNC: 13 MEQ/L (ref 9–15)
AST SERPL-CCNC: 25 U/L (ref 0–35)
BASOPHILS ABSOLUTE: 0.1 K/UL (ref 0–0.2)
BASOPHILS RELATIVE PERCENT: 1.1 %
BILIRUB SERPL-MCNC: <0.2 MG/DL (ref 0.2–0.7)
BILIRUBIN, POC: ABNORMAL
BLOOD URINE, POC: ABNORMAL
BUN BLDV-MCNC: 15 MG/DL (ref 6–20)
CALCIUM SERPL-MCNC: 9.9 MG/DL (ref 8.5–9.9)
CHLORIDE BLD-SCNC: 99 MEQ/L (ref 95–107)
CHOLESTEROL, TOTAL: 201 MG/DL (ref 0–199)
CLARITY, POC: CLEAR
CO2: 24 MEQ/L (ref 20–31)
COLOR, POC: ABNORMAL
CREAT SERPL-MCNC: 0.6 MG/DL (ref 0.5–0.9)
EOSINOPHILS ABSOLUTE: 0.1 K/UL (ref 0–0.7)
EOSINOPHILS RELATIVE PERCENT: 1 %
GFR AFRICAN AMERICAN: >60
GFR NON-AFRICAN AMERICAN: >60
GLOBULIN: 3.3 G/DL (ref 2.3–3.5)
GLUCOSE BLD-MCNC: 95 MG/DL (ref 70–99)
GLUCOSE URINE, POC: ABNORMAL
HCT VFR BLD CALC: 41.5 % (ref 37–47)
HDLC SERPL-MCNC: 61 MG/DL (ref 40–59)
HEMOGLOBIN: 13.5 G/DL (ref 12–16)
IRON SATURATION: 29 % (ref 11–46)
IRON: 89 UG/DL (ref 37–145)
KETONES, POC: ABNORMAL
LDL CHOLESTEROL CALCULATED: 118 MG/DL (ref 0–129)
LEUKOCYTE EST, POC: ABNORMAL
LYMPHOCYTES ABSOLUTE: 1.6 K/UL (ref 1–4.8)
LYMPHOCYTES RELATIVE PERCENT: 22.4 %
MCH RBC QN AUTO: 29.2 PG (ref 27–31.3)
MCHC RBC AUTO-ENTMCNC: 32.5 % (ref 33–37)
MCV RBC AUTO: 89.7 FL (ref 82–100)
MONOCYTES ABSOLUTE: 0.6 K/UL (ref 0.2–0.8)
MONOCYTES RELATIVE PERCENT: 8.1 %
NEUTROPHILS ABSOLUTE: 4.8 K/UL (ref 1.4–6.5)
NEUTROPHILS RELATIVE PERCENT: 67.4 %
NITRITE, POC: ABNORMAL
PDW BLD-RTO: 13.6 % (ref 11.5–14.5)
PH, POC: 6.5
PLATELET # BLD: 225 K/UL (ref 130–400)
POTASSIUM SERPL-SCNC: 3.9 MEQ/L (ref 3.4–4.9)
PROTEIN, POC: ABNORMAL
RBC # BLD: 4.63 M/UL (ref 4.2–5.4)
SODIUM BLD-SCNC: 136 MEQ/L (ref 135–144)
SPECIFIC GRAVITY, POC: 1025
T4 FREE: 1.1 NG/DL (ref 0.84–1.68)
TOTAL IRON BINDING CAPACITY: 309 UG/DL (ref 178–450)
TOTAL PROTEIN: 7.3 G/DL (ref 6.3–8)
TRIGL SERPL-MCNC: 112 MG/DL (ref 0–150)
TSH SERPL DL<=0.05 MIU/L-ACNC: 2.66 UIU/ML (ref 0.44–3.86)
UROBILINOGEN, POC: 3.5
VITAMIN D 25-HYDROXY: 32.3 NG/ML (ref 30–100)
WBC # BLD: 7.1 K/UL (ref 4.8–10.8)

## 2021-01-12 PROCEDURE — 81003 URINALYSIS AUTO W/O SCOPE: CPT | Performed by: NURSE PRACTITIONER

## 2021-01-12 PROCEDURE — 99214 OFFICE O/P EST MOD 30 MIN: CPT | Performed by: NURSE PRACTITIONER

## 2021-01-12 RX ORDER — NITROFURANTOIN 25; 75 MG/1; MG/1
100 CAPSULE ORAL 2 TIMES DAILY
Qty: 14 CAPSULE | Refills: 0 | Status: SHIPPED | OUTPATIENT
Start: 2021-01-12 | End: 2021-01-19

## 2021-01-12 RX ORDER — FLUCONAZOLE 150 MG/1
150 TABLET ORAL ONCE
Qty: 1 TABLET | Refills: 0 | Status: SHIPPED | OUTPATIENT
Start: 2021-01-12 | End: 2021-01-12

## 2021-01-12 RX ORDER — ALPRAZOLAM 1 MG/1
1 TABLET ORAL NIGHTLY PRN
Qty: 90 TABLET | Refills: 0 | Status: SHIPPED | OUTPATIENT
Start: 2021-01-12 | End: 2021-04-13 | Stop reason: SDUPTHER

## 2021-01-12 ASSESSMENT — PATIENT HEALTH QUESTIONNAIRE - PHQ9
SUM OF ALL RESPONSES TO PHQ QUESTIONS 1-9: 0
SUM OF ALL RESPONSES TO PHQ9 QUESTIONS 1 & 2: 0
2. FEELING DOWN, DEPRESSED OR HOPELESS: 0
SUM OF ALL RESPONSES TO PHQ QUESTIONS 1-9: 0

## 2021-01-12 ASSESSMENT — ENCOUNTER SYMPTOMS
STRIDOR: 0
HEARTBURN: 0
VISUAL CHANGE: 0
NAUSEA: 0
WATER BRASH: 0
HOARSE VOICE: 0
BACK PAIN: 1
SWOLLEN GLANDS: 0
CHOKING: 0
WHEEZING: 0
ABDOMINAL PAIN: 0
VOMITING: 0
CHANGE IN BOWEL HABIT: 0
GLOBUS SENSATION: 0
BELCHING: 0
COUGH: 0
SORE THROAT: 0

## 2021-01-12 NOTE — PROGRESS NOTES
Wyatt Gonzales (:  1965) is a 50 y.o. female,Established patient, here for evaluation of the following chief complaint(s):  3 Month Follow-Up, Hypogonadism, Anxiety, Insomnia, and Urinary Tract Infection      ASSESSMENT/PLAN:  1. Encounter for screening mammogram for malignant neoplasm of breast  -     VIGNESH DIGITAL SCREEN W OR WO CAD BILATERAL; Future  2. Primary insomnia  -     ALPRAZolam (XANAX) 1 MG tablet; Take 1 tablet by mouth nightly as needed for Sleep for up to 90 days. , Disp-90 tablet, R-0Normal  3. Anxiety  -     ALPRAZolam (XANAX) 1 MG tablet; Take 1 tablet by mouth nightly as needed for Sleep for up to 90 days. , Disp-90 tablet, R-0Normal  4. Acquired hypothyroidism  -     Lipid Panel; Future  -     Comprehensive Metabolic Panel; Future  -     CBC With Auto Differential; Future  -     TSH Without Reflex; Future  -     T4, Free; Future  5. Acute cystitis without hematuria  -     POCT Urinalysis No Micro (Auto)  -     nitrofurantoin, macrocrystal-monohydrate, (MACROBID) 100 MG capsule; Take 1 capsule by mouth 2 times daily for 7 days, Disp-14 capsule, R-0Normal  -     fluconazole (DIFLUCAN) 150 MG tablet; Take 1 tablet by mouth once for 1 dose, Disp-1 tablet, R-0Normal  6. Chronic fatigue  -     Comprehensive Metabolic Panel; Future  -     CBC With Auto Differential; Future  -     TSH Without Reflex; Future  -     Rheumatiod Arthritis Diagnostic Panel; Future  -     Lupus (Le) Panel W/ Reflex; Future  -     Iron And Tibc; Future  -     Vitamin D 25 Hydroxy; Future  7. Close exposure to COVID-19 virus  8. Enlarged thyroid  -     US HEAD NECK SOFT TISSUE THYROID; Future      No follow-ups on file. SUBJECTIVE/OBJECTIVE:  RLS:  controlled    Hypothyroidism: Patient presents for evaluation of thyroid function. Symptoms consist of denies fatigue, weight changes, heat/cold intolerance, bowel/skin changes or CVS symptoms. The symptoms are none. The problem has been controlled.   Previous thyroid studies include TSH. The hypothyroidism is due to hypothyroidism and Hashimoto's disease    Urinary Tract Infection   This is a new problem. The current episode started in the past 7 days. The problem has been unchanged. The quality of the pain is described as aching. The pain is at a severity of 1/10. The pain is mild. There has been no fever. She is not sexually active. There is no history of pyelonephritis. Associated symptoms include frequency and urgency. Pertinent negatives include no chills, nausea or vomiting. Back Pain  Pertinent negatives include no abdominal pain, chest pain, fever, headaches, numbness, weakness or weight loss. Gastroesophageal Reflux  She reports no abdominal pain, no belching, no chest pain, no choking, no coughing, no dysphagia, no early satiety, no globus sensation, no heartburn, no hoarse voice, no nausea, no sore throat, no stridor, no tooth decay, no water brash or no wheezing. This is a new (over the past 3 months) problem. The current episode started more than 1 month ago. The problem occurs constantly (worse at night). The problem has been unchanged. The heartburn duration is several minutes. The heartburn is located in the abdomen and substernum. The heartburn is of moderate intensity. The heartburn wakes her from sleep. The heartburn does not limit her activity. The heartburn changes (worse with laying laying flat ) with position. The symptoms are aggravated by lying down, caffeine and certain foods. Associated symptoms include fatigue. Pertinent negatives include no anemia, melena, muscle weakness, orthopnea or weight loss. There are no known risk factors. She has tried nothing for the symptoms. Past procedures do not include an abdominal ultrasound, an EGD, esophageal manometry, esophageal pH monitoring, H. pylori antibody titer or a UGI. Past invasive treatments do not include gastroplasty, gastroplication or reflux surgery. Fatigue  This is a chronic problem.  The current episode started more than 1 year ago. The problem occurs constantly. The problem has been unchanged. Associated symptoms include arthralgias, fatigue, myalgias and urinary symptoms. Pertinent negatives include no abdominal pain, anorexia, change in bowel habit, chest pain, chills, congestion, coughing, diaphoresis, fever, headaches, joint swelling, nausea, neck pain, numbness, rash, sore throat, swollen glands, vertigo, visual change, vomiting or weakness. Nothing aggravates the symptoms. Treatments tried: Multiple lab work-ups. The treatment provided no relief. Review of Systems   Constitutional: Positive for fatigue. Negative for chills, diaphoresis, fever and weight loss. HENT: Negative for congestion, hoarse voice and sore throat. Respiratory: Negative for cough, choking and wheezing. Cardiovascular: Negative for chest pain. Gastrointestinal: Negative for abdominal pain, anorexia, change in bowel habit, dysphagia, heartburn, melena, nausea and vomiting. Genitourinary: Positive for frequency and urgency. Musculoskeletal: Positive for arthralgias, back pain and myalgias. Negative for joint swelling, muscle weakness and neck pain. Skin: Negative for rash. Neurological: Negative for vertigo, weakness, numbness and headaches. Physical Exam  Constitutional:       General: She is not in acute distress. Appearance: She is well-developed. HENT:      Head: Normocephalic and atraumatic. Right Ear: Tympanic membrane and external ear normal.      Left Ear: Tympanic membrane and external ear normal.      Nose: Nose normal.   Eyes:      Conjunctiva/sclera: Conjunctivae normal.      Pupils: Pupils are equal, round, and reactive to light. Neck:      Musculoskeletal: Normal range of motion and neck supple. Thyroid: Thyromegaly present. No thyroid mass or thyroid tenderness. Vascular: No JVD. Cardiovascular:      Rate and Rhythm: Normal rate and regular rhythm. Pulses: Normal pulses. Heart sounds: Normal heart sounds. No murmur. Pulmonary:      Effort: Pulmonary effort is normal. No respiratory distress. Breath sounds: Normal breath sounds. No wheezing or rales. Abdominal:      General: Bowel sounds are normal. There is no distension. Palpations: Abdomen is soft. There is no mass. Tenderness: There is no abdominal tenderness. Lymphadenopathy:      Cervical: No cervical adenopathy. Skin:     General: Skin is warm and dry. Capillary Refill: Capillary refill takes less than 2 seconds. Neurological:      Mental Status: She is alert and oriented to person, place, and time. Results for orders placed or performed in visit on 01/12/21   POCT Urinalysis No Micro (Auto)   Result Value Ref Range    Color, UA yellow clear     Clarity, UA clear     Glucose, UA POC n     Bilirubin, UA n     Ketones, UA n     Spec Grav, UA 1,025     Blood, UA POC pos     pH, UA 6.5     Protein, UA POC n     Urobilinogen, UA 3.5     Leukocytes, UA pos     Nitrite, UA n      On this date 01/12/21 I have spent 30 minutes reviewing previous notes, test results and face to face with the patient discussing the diagnosis and importance of compliance with the treatment plan as well as documenting on the day of the visit. An electronic signature was used to authenticate this note.     --VALENTIN Reza - CNP

## 2021-01-13 ENCOUNTER — TELEPHONE (OUTPATIENT)
Dept: PULMONOLOGY | Age: 56
End: 2021-01-13

## 2021-01-13 DIAGNOSIS — R06.09 DOE (DYSPNEA ON EXERTION): ICD-10-CM

## 2021-01-13 LAB — SARS-COV-2 ANTIBODY, TOTAL: POSITIVE

## 2021-01-13 RX ORDER — MONTELUKAST SODIUM 10 MG/1
10 TABLET ORAL DAILY
Qty: 30 TABLET | Refills: 3 | Status: SHIPPED | OUTPATIENT
Start: 2021-01-13 | End: 2022-05-27 | Stop reason: CLARIF

## 2021-01-13 NOTE — TELEPHONE ENCOUNTER
PATIENT STATES SHE JUST TALKED TO DR JAMES AND HE WANTS YOU TO SEND A REFILL FOR HER SINGULAR TO [facility] IN THE [facility].

## 2021-01-15 LAB
ANA IGG, ELISA: NORMAL
C3 COMPLEMENT: 148 MG/DL (ref 88–201)
C4 COMPLEMENT: 31 MG/DL (ref 10–40)
CCP IGG ANTIBODIES: 8 UNITS (ref 0–19)
RHEUMATOID FACTOR: <10 IU/ML (ref 0–14)

## 2021-01-18 ENCOUNTER — HOSPITAL ENCOUNTER (OUTPATIENT)
Dept: ULTRASOUND IMAGING | Age: 56
Discharge: HOME | End: 2021-01-20
Payer: COMMERCIAL

## 2021-01-18 ENCOUNTER — OFFICE VISIT (OUTPATIENT)
Dept: ORTHOPEDIC SURGERY | Age: 56
End: 2021-01-18
Payer: COMMERCIAL

## 2021-01-18 VITALS
HEART RATE: 78 BPM | OXYGEN SATURATION: 98 % | TEMPERATURE: 97.1 F | WEIGHT: 137 LBS | BODY MASS INDEX: 27.62 KG/M2 | HEIGHT: 59 IN

## 2021-01-18 DIAGNOSIS — E04.9 ENLARGED THYROID: ICD-10-CM

## 2021-01-18 DIAGNOSIS — M77.01 GOLFER'S ELBOW, RIGHT: Primary | ICD-10-CM

## 2021-01-18 PROCEDURE — 76536 US EXAM OF HEAD AND NECK: CPT

## 2021-01-18 PROCEDURE — 99213 OFFICE O/P EST LOW 20 MIN: CPT | Performed by: ORTHOPAEDIC SURGERY

## 2021-01-18 PROCEDURE — 20551 NJX 1 TENDON ORIGIN/INSJ: CPT | Performed by: ORTHOPAEDIC SURGERY

## 2021-01-18 RX ORDER — BETAMETHASONE SODIUM PHOSPHATE AND BETAMETHASONE ACETATE 3; 3 MG/ML; MG/ML
6 INJECTION, SUSPENSION INTRA-ARTICULAR; INTRALESIONAL; INTRAMUSCULAR; SOFT TISSUE ONCE
Status: COMPLETED | OUTPATIENT
Start: 2021-01-18 | End: 2021-01-18

## 2021-01-18 RX ORDER — LIDOCAINE HYDROCHLORIDE 10 MG/ML
2 INJECTION, SOLUTION INFILTRATION; PERINEURAL ONCE
Status: COMPLETED | OUTPATIENT
Start: 2021-01-18 | End: 2021-01-18

## 2021-01-18 RX ADMIN — LIDOCAINE HYDROCHLORIDE 2 ML: 10 INJECTION, SOLUTION INFILTRATION; PERINEURAL at 09:26

## 2021-01-18 RX ADMIN — BETAMETHASONE SODIUM PHOSPHATE AND BETAMETHASONE ACETATE 6 MG: 3; 3 INJECTION, SUSPENSION INTRA-ARTICULAR; INTRALESIONAL; INTRAMUSCULAR; SOFT TISSUE at 09:13

## 2021-01-18 NOTE — PROGRESS NOTES
 Sexual activity: Yes   Lifestyle    Physical activity     Days per week: Not on file     Minutes per session: Not on file    Stress: Not on file   Relationships    Social connections     Talks on phone: Not on file     Gets together: Not on file     Attends [employer] service: Not on file     Active member of club or organization: Not on file     Attends meetings of clubs or organizations: Not on file     Relationship status: Not on file    Intimate partner violence     Fear of current or ex partner: Not on file     Emotionally abused: Not on file     Physically abused: Not on file     Forced sexual activity: Not on file   Other Topics Concern    Not on file   Social History Narrative    Not on file     Family History   Problem Relation Age of Onset    Hypertension Mother     Thyroid Disease Mother     High Blood Pressure Mother     Diabetes Maternal Grandmother     Breast Cancer Sister     Heart Attack Brother          at age 62     Allergies   Allergen Reactions    Iv Contrast [Iodides] Hives     After injection of isovue 370 75 ml patient developed hives. Two noted total on forehead and right temporal area.  Pcn [Penicillins] Other (See Comments)     syncope  Syncope     Menthol-Methyl Salicylate Rash     Current Outpatient Medications on File Prior to Visit   Medication Sig Dispense Refill    montelukast (SINGULAIR) 10 MG tablet Take 1 tablet by mouth daily 30 tablet 3    ALPRAZolam (XANAX) 1 MG tablet Take 1 tablet by mouth nightly as needed for Sleep for up to 90 days.  90 tablet 0    nitrofurantoin, macrocrystal-monohydrate, (MACROBID) 100 MG capsule Take 1 capsule by mouth 2 times daily for 7 days 14 capsule 0    DULoxetine (CYMBALTA) 60 MG extended release capsule TAKE 1 CAPSULE BY MOUTH ONE TIME A DAY 90 capsule 0    meloxicam (MOBIC) 15 MG tablet TAKE 1 TABLET BY MOUTH EVERY DAY AFTER A MEAL UNTIL RELIEF 90 tablet 0  omeprazole (PRILOSEC) 20 MG delayed release capsule Take 1 capsule by mouth Daily 30 capsule 2    fluticasone (ARNUITY ELLIPTA) 200 MCG/ACT AEPB Inhale 1 Inhaler into the lungs daily 30 each 3    ipratropium-albuterol (DUONEB) 0.5-2.5 (3) MG/3ML SOLN nebulizer solution Inhale 3 mLs into the lungs every 4 hours as needed for Shortness of Breath 360 mL 2    levothyroxine (SYNTHROID) 25 MCG tablet Take 1 tablet by mouth daily 30 tablet 11    venlafaxine (EFFEXOR XR) 37.5 MG extended release capsule TAKE 1 CAPSULE BY MOUTH ONE TIME A DAY 30 capsule 2    fluticasone-umeclidin-vilant (TRELEGY ELLIPTA) 100-62.5-25 MCG/INH AEPB Inhale 1 puff into the lungs daily 1 each 3    fluticasone (FLONASE) 50 MCG/ACT nasal spray 1 spray by Each Nostril route daily 1 Bottle 3     No current facility-administered medications on file prior to visit. Review of Systems   General: Denies fever, chills  Cardiovascular: Denies chest pain  Pulmonary: Denies shortness of breath  GI: Denies nausea or vomiting  Neuro: Denies numbness or tingling      Objective:   Pulse 78   Temp 97.1 °F (36.2 °C) (Temporal)   Ht 4' 11\" (1.499 m)   Wt 137 lb (62.1 kg)   SpO2 98%   BMI 27.67 kg/m²     Ortho Exam   General: Well-appearing female who appears their stated age  Right upper extremity:  Skin: Intact circumferentially, no swelling, ecchymosis or edema is appreciated  Neuro: Sensation intact light touch in the radial, ulnar and median nerve distribution. Able to perform all cardinal hand movements. Vascular: Strong palpable radial pulse, brisk cap refill in all digits. MSK: Moderate tenderness to palpation about the medial aspect of the right elbow about the flexor tendon origin. Patient has significant pain on resisted flexion of the wrist. No significant pain with gentle flexion and extension of the elbow.     Procedure note: Point of maximal tenderness palpation was appreciated about the flexor tendon origin on the medial aspect of the right elbow. This area was cleaned with Betadine and an injection using 2 cc of 1% lidocaine without epinephrine and 6 mg of betamethasone acetate -betamethasone sodium phosphate (Celestone) was injected into the flexor tendon origin about the medial epicondyle without difficulty. Soft compressive dressing was applied. Patient tolerated the procedure well. No change in neurovascular status distally. Radiographs and Laboratory Studies:     Diagnostic Imaging Studies:    No imaging was taken during this visit    Laboratory Studies:   Lab Results   Component Value Date    WBC 7.1 01/12/2021    HGB 13.5 01/12/2021    HCT 41.5 01/12/2021    MCV 89.7 01/12/2021     01/12/2021     Lab Results   Component Value Date    SEDRATE 8 07/15/2019     Lab Results   Component Value Date    CRP 1.3 07/15/2019       Assessment:       Diagnosis Orders   1. Golfer's elbow, right  AR INJECT TENDON ORIGIN/INSERT          Plan:     Patient underwent steroid injection for golfers elbow on the right elbow. This is a new problem/different visit than patient follow-up for prior surgery for trigger thumb release in the left thumb. Patient tolerated injection well. Patient should gradually resume activities as tolerated. Did  patient about her mental effects of repeated injections about this region and as such would limit injections to this area over time. Patient can be seen again on an as-needed basis. Orders Placed This Encounter   Procedures    AR INJECT TENDON ORIGIN/INSERT     Orders Placed This Encounter   Medications    betamethasone acetate-betamethasone sodium phosphate (CELESTONE) injection 6 mg       No follow-ups on file.       Jelani Duckworth MD

## 2021-01-25 ENCOUNTER — HOSPITAL ENCOUNTER (OUTPATIENT)
Dept: WOMENS IMAGING | Age: 56
Discharge: HOME | End: 2021-01-27
Payer: COMMERCIAL

## 2021-01-25 DIAGNOSIS — Z12.31 ENCOUNTER FOR SCREENING MAMMOGRAM FOR MALIGNANT NEOPLASM OF BREAST: ICD-10-CM

## 2021-01-25 PROCEDURE — 77067 SCR MAMMO BI INCL CAD: CPT

## 2021-01-26 DIAGNOSIS — E04.2 MULTIPLE THYROID NODULES: Primary | ICD-10-CM

## 2021-01-28 RX ORDER — SULFAMETHOXAZOLE AND TRIMETHOPRIM 800; 160 MG/1; MG/1
1 TABLET ORAL 2 TIMES DAILY
Qty: 14 TABLET | Refills: 0 | Status: SHIPPED | OUTPATIENT
Start: 2021-01-28 | End: 2021-02-04

## 2021-02-03 ENCOUNTER — OFFICE VISIT (OUTPATIENT)
Dept: INTERVENTIONAL RADIOLOGY/VASCULAR | Age: 56
End: 2021-02-03
Payer: COMMERCIAL

## 2021-02-03 VITALS
WEIGHT: 137 LBS | SYSTOLIC BLOOD PRESSURE: 125 MMHG | TEMPERATURE: 97.3 F | DIASTOLIC BLOOD PRESSURE: 83 MMHG | HEART RATE: 87 BPM | BODY MASS INDEX: 27.67 KG/M2

## 2021-02-03 DIAGNOSIS — R13.19 OTHER DYSPHAGIA: ICD-10-CM

## 2021-02-03 DIAGNOSIS — E04.2 MULTINODULAR THYROID: Primary | ICD-10-CM

## 2021-02-03 PROCEDURE — 99244 OFF/OP CNSLTJ NEW/EST MOD 40: CPT | Performed by: NURSE PRACTITIONER

## 2021-02-03 ASSESSMENT — ENCOUNTER SYMPTOMS
VOICE CHANGE: 1
COUGH: 0
EYES NEGATIVE: 1
VOMITING: 0
DIARRHEA: 0
NAUSEA: 0
BACK PAIN: 0
SHORTNESS OF BREATH: 0
WHEEZING: 0
SORE THROAT: 1
ABDOMINAL PAIN: 0
ABDOMINAL DISTENTION: 0
TROUBLE SWALLOWING: 1

## 2021-02-03 NOTE — PROGRESS NOTES
Wyatt Gonzales, a female of 50 y.o. came to the office 2/3/2021. Chief Complaint   Patient presents with    Consultation      thyroid nodules bx       HPI: Wyatt Gonzales is a pleasant female who presents to clinic for consultation at the request of her PCP for evaluation of multinodular thyroid and possible US needle biopsy. Patient presents with symptoms of: occasional dysphagia, feels like theres something stuck in her throat on occasion, occasional sore throat, occasional hoarseness, constant fatigue. Symptom onset x 1 year. US shows multinodular thyroid. Family History   Problem Relation Age of Onset    Hypertension Mother     Thyroid Disease Mother     High Blood Pressure Mother     Diabetes Maternal Grandmother     Breast Cancer Sister     Heart Attack Brother          at age 62       Past Surgical History:   Procedure Laterality Date    FINGER TRIGGER RELEASE Left 2020    LEFT TRIGGER THUMB RELEASE.  HAND TRAY performed by Duglas Luque MD at [address] [place] [address]      preseve ovaries    PARTIAL HYSTERECTOMY          Past Medical History:   Diagnosis Date    Arthritis     Asthma     Chronic fatigue 2017    Dizziness 2017    Hypertension     past trx x 1 yr -- off meds > 4 yrs    Hypothyroidism     meds > 3 yrs -- intermittently trx    Lightheadedness 2017    SOB (shortness of breath) 2017    Weakness 2017       Social History     Socioeconomic History    Marital status: Single     Spouse name: Not on file    Number of children: Not on file    Years of education: Not on file    Highest education level: Not on file   Occupational History    Not on file   Social Needs    Financial resource strain: Not on file    Food insecurity     Worry: Not on file     Inability: Not on file    Transportation needs     Medical: Not on file     Non-medical: Not on file   Tobacco Use    Smoking status: Former Smoker     Packs/day: 0.10 Years: 13.00     Pack years: 1.30     Types: Cigarettes     Quit date: 10/14/2013     Years since quittin.3    Smokeless tobacco: Never Used   Substance and Sexual Activity    Alcohol use: No    Drug use: No    Sexual activity: Yes   Lifestyle    Physical activity     Days per week: Not on file     Minutes per session: Not on file    Stress: Not on file   Relationships    Social connections     Talks on phone: Not on file     Gets together: Not on file     Attends Spiritism service: Not on file     Active member of club or organization: Not on file     Attends meetings of clubs or organizations: Not on file     Relationship status: Not on file    Intimate partner violence     Fear of current or ex partner: Not on file     Emotionally abused: Not on file     Physically abused: Not on file     Forced sexual activity: Not on file   Other Topics Concern    Not on file   Social History Narrative    Not on file       Allergies   Allergen Reactions    Iv Contrast [Iodides] Hives     After injection of isovue 370 75 ml patient developed hives. Two noted total on forehead and right temporal area.  Pcn [Penicillins] Other (See Comments)     syncope  Syncope     Menthol-Methyl Salicylate Rash       Current Outpatient Medications on File Prior to Visit   Medication Sig Dispense Refill    sulfamethoxazole-trimethoprim (BACTRIM DS;SEPTRA DS) 800-160 MG per tablet Take 1 tablet by mouth 2 times daily for 7 days 14 tablet 0    montelukast (SINGULAIR) 10 MG tablet Take 1 tablet by mouth daily 30 tablet 3    ALPRAZolam (XANAX) 1 MG tablet Take 1 tablet by mouth nightly as needed for Sleep for up to 90 days.  90 tablet 0    DULoxetine (CYMBALTA) 60 MG extended release capsule TAKE 1 CAPSULE BY MOUTH ONE TIME A DAY 90 capsule 0    meloxicam (MOBIC) 15 MG tablet TAKE 1 TABLET BY MOUTH EVERY DAY AFTER A MEAL UNTIL RELIEF 90 tablet 0    omeprazole (PRILOSEC) 20 MG delayed release capsule Take 1 capsule by mouth and Rhythm: Normal rate and regular rhythm. Heart sounds: Normal heart sounds. Pulmonary:      Effort: Pulmonary effort is normal.   Abdominal:      Palpations: Abdomen is soft. Musculoskeletal: Normal range of motion. Right lower leg: No edema. Left lower leg: No edema. Skin:     General: Skin is warm and dry. Capillary Refill: Capillary refill takes 2 to 3 seconds. Neurological:      Mental Status: She is alert and oriented to person, place, and time. Psychiatric:         Mood and Affect: Mood normal.         Behavior: Behavior normal.         Narrative   EXAMINATION: US HEAD NECK SOFT TISSUE THYROID       DATE AND TIME:1/18/2021 11:39 AM       CLINICAL HISTORY: Thyromegaly E04.9 Enlarged thyroid ICD10        COMPARISONS: March 28, 2017        TECHNIQUE: Biplanar images were obtained. FINDINGS: Right lobe: 4.1 cm.                               Left lobe:  3.6 cm.                            Isthmus:  0.4 cm.                               Overall size: The thyroid gland is normal in size. .                            Right nodule: Questionable posterior inferior 7 mm isoechoic nodule lower pole of right thyroid lobe. This is unchanged from 2017. No echogenic foci. Appearance consistent with a TR 3 nodule.                       Left nodule: 5 x 3 mm hypoechoic nodule mid pole. No echogenic foci. Margins reasonably defined.  Nodule not wider than tall, appearance consistent with TR 4 nodule.           Impression   SMALL INCIDENTAL NODULES REQUIRING NO IMAGING FOLLOW-UP BASED ON CURRENT ACR GUIDELINES       RECOMMENDATIONS       TR1: No FNA required   TR2: No FNA required   TR3: ? 1.5 cm follow up, ? 2.5 cm FNA             Follow up: 1, 3 and 5 years   TR4: ? 1.0 cm follow up, ? 1.5 cm FNA             Follow up: 1, 2, 3 and 5 years   TR5: ? 0.5 cm follow up, ? 1.0 cm FNA             Annual follow up for up to 5 years       Biopsy is recommended for suspicious lesions (TR 3-TR 5) with the recommendations are yearly US. US results and this discussed with patient. 2. Follow up with PCP as scheduled. No further care required in IR clinic. Thank Brii Terrazas CNP for referral of your patient to our practice for care.      VALENTIN Benites CNP

## 2021-02-04 ENCOUNTER — PATIENT MESSAGE (OUTPATIENT)
Dept: FAMILY MEDICINE CLINIC | Age: 56
End: 2021-02-04

## 2021-02-05 NOTE — TELEPHONE ENCOUNTER
From: Patrisha Sicard  To: VALENTIN Chicas - CNP  Sent: 2/4/2021 4:46 PM EST  Subject: Visit Follow-Up Question    Carlos A romero so the antibiotics are not working I'm wondering if I have a kidney infection can I make an appointment with you again or do I go elsewhere.

## 2021-02-10 ENCOUNTER — OFFICE VISIT (OUTPATIENT)
Dept: FAMILY MEDICINE CLINIC | Age: 56
End: 2021-02-10
Payer: COMMERCIAL

## 2021-02-10 VITALS
TEMPERATURE: 98.1 F | BODY MASS INDEX: 27.94 KG/M2 | HEART RATE: 83 BPM | OXYGEN SATURATION: 99 % | DIASTOLIC BLOOD PRESSURE: 92 MMHG | WEIGHT: 138.6 LBS | SYSTOLIC BLOOD PRESSURE: 143 MMHG | HEIGHT: 59 IN

## 2021-02-10 DIAGNOSIS — N76.0 ACUTE VAGINITIS: Primary | ICD-10-CM

## 2021-02-10 DIAGNOSIS — R30.0 DYSURIA: ICD-10-CM

## 2021-02-10 DIAGNOSIS — R33.9 INCOMPLETE BLADDER EMPTYING: ICD-10-CM

## 2021-02-10 DIAGNOSIS — R10.84 GENERALIZED ABDOMINAL PAIN: ICD-10-CM

## 2021-02-10 DIAGNOSIS — N76.0 ACUTE VAGINITIS: ICD-10-CM

## 2021-02-10 LAB
BILIRUBIN, POC: NORMAL
BLOOD URINE, POC: NORMAL
CLARITY, POC: CLEAR
COLOR, POC: NORMAL
GLUCOSE URINE, POC: NORMAL
KETONES, POC: NORMAL
LEUKOCYTE EST, POC: NORMAL
NITRITE, POC: NORMAL
PH, POC: 7
PROTEIN, POC: NORMAL
SPECIFIC GRAVITY, POC: 1.01
UROBILINOGEN, POC: NORMAL

## 2021-02-10 PROCEDURE — 99213 OFFICE O/P EST LOW 20 MIN: CPT | Performed by: NURSE PRACTITIONER

## 2021-02-10 PROCEDURE — 81003 URINALYSIS AUTO W/O SCOPE: CPT | Performed by: NURSE PRACTITIONER

## 2021-02-10 SDOH — ECONOMIC STABILITY: FOOD INSECURITY: WITHIN THE PAST 12 MONTHS, THE FOOD YOU BOUGHT JUST DIDN'T LAST AND YOU DIDN'T HAVE MONEY TO GET MORE.: NEVER TRUE

## 2021-02-10 SDOH — ECONOMIC STABILITY: INCOME INSECURITY: HOW HARD IS IT FOR YOU TO PAY FOR THE VERY BASICS LIKE FOOD, HOUSING, MEDICAL CARE, AND HEATING?: NOT VERY HARD

## 2021-02-10 ASSESSMENT — ENCOUNTER SYMPTOMS
NAUSEA: 0
VOMITING: 0

## 2021-02-10 NOTE — PATIENT INSTRUCTIONS
Patient Education        Bladder Training: Care Instructions  Your Care Instructions     Bladder training is used to treat urge incontinence and stress incontinence. Urge incontinence means that the need to urinate comes on so fast that you can't get to a toilet in time. Stress incontinence means that you leak urine because of pressure on your bladder. For example, it may happen when you laugh, cough, or lift something heavy. Bladder training can increase how long you can wait before you have to urinate. It can also help your bladder hold more urine. And it can give you better control over the urge to urinate. It is important to remember that bladder training takes a few weeks to a few months to make a difference. You may not see results right away, but don't give up. Follow-up care is a key part of your treatment and safety. Be sure to make and go to all appointments, and call your doctor if you are having problems. It's also a good idea to know your test results and keep a list of the medicines you take. How can you care for yourself at home? Work with your doctor to come up with a bladder training program that is right for you. You may use one or more of the following methods. Delayed urination  · In the beginning, try to keep from urinating for 5 minutes after you first feel the need to go. · While you wait, take deep, slow breaths to relax. Kegel exercises can also help you delay the need to go to the bathroom. · After some practice, when you can easily wait 5 minutes to urinate, try to wait 10 minutes before you urinate. · Slowly increase the waiting period until you are able to control when you have to urinate. Scheduled urination  · Empty your bladder when you first wake up in the morning. · Schedule times throughout the day when you will urinate. · Start by going to the bathroom every hour, even if you don't need to go. · Slowly increase the time between trips to the bathroom. · When you have found a schedule that works well for you, keep doing it. · If you wake up during the night and have to urinate, do it. Apply your schedule to waking hours only. Kegel exercises  These tighten and strengthen pelvic muscles, which can help you control the flow of urine. To do Kegel exercises:  · Squeeze the same muscles you would use to stop your urine. Your belly and thighs should not move. · Hold the squeeze for 3 seconds, and then relax for 3 seconds. · Start with 3 seconds. Then add 1 second each week until you are able to squeeze for 10 seconds. · Repeat the exercise 10 to 15 times a session. Do three or more sessions a day. When should you call for help? Watch closely for changes in your health, and be sure to contact your doctor if:    · Your incontinence is getting worse.     · You do not get better as expected. Where can you learn more? Go to https://[employer]Ufree. org and sign in to your [email or web address] account. Enter G732 in the [employer] box to learn more about \"Bladder Training: Care Instructions. \"     If you do not have an account, please click on the \"Sign Up Now\" link. Current as of: June 29, 2020               Content Version: 12.6  © 2780-3420 [employer]. Care instructions adapted under license by [facility] ([facility]). If you have questions about a medical condition or this instruction, always ask your healthcare professional. [place] [address] any warranty or liability for your use of this information.

## 2021-02-10 NOTE — PROGRESS NOTES
Subjective:      Patient ID: Zenon Craig is a 50 y.o. female who presents today for:     Chief Complaint   Patient presents with    Dysuria     Patient presents today c/o painful urination x 2-3 months. Patient c/o bilateral low back pain patient states most of the pain is on the Rt side. Patient states she has been on 2 different antibiotics. She finished the bactrim on 2021. Dysuria   This is a new problem. The current episode started more than 1 month ago. The problem has been unchanged. The quality of the pain is described as burning. There has been no fever. Associated symptoms include flank pain, frequency and urgency. Pertinent negatives include no chills, discharge, hematuria, hesitancy, nausea, sweats or vomiting. She has tried antibiotics for the symptoms. The treatment provided no relief. Past Medical History:   Diagnosis Date    Arthritis     Asthma     Chronic fatigue 2017    Dizziness 2017    Hypertension     past trx x 1 yr -- off meds > 4 yrs    Hypothyroidism     meds > 3 yrs -- intermittently trx    Lightheadedness 2017    SOB (shortness of breath) 2017    Weakness 2017     Past Surgical History:   Procedure Laterality Date    FINGER TRIGGER RELEASE Left 2020    LEFT TRIGGER THUMB RELEASE.  HAND TRAY performed by Jessie Floyd MD at [address] [place]      preseve ovaries    PARTIAL HYSTERECTOMY       Family History   Problem Relation Age of Onset    Hypertension Mother     Thyroid Disease Mother     High Blood Pressure Mother     Diabetes Maternal Grandmother     Breast Cancer Sister     Heart Attack Brother          at age 62     Social History     Socioeconomic History    Marital status: Single     Spouse name: Not on file    Number of children: Not on file    Years of education: Not on file    Highest education level: Not on file   Occupational History    Not on file   Social Needs    Financial resource strain: Not very hard    Food insecurity     Worry: Never true     Inability: Never true    Transportation needs     Medical: No     Non-medical: No   Tobacco Use    Smoking status: Former Smoker     Packs/day: 0.10     Years: 13.00     Pack years: 1.30     Types: Cigarettes     Quit date: 10/14/2013     Years since quittin.3    Smokeless tobacco: Never Used   Substance and Sexual Activity    Alcohol use: No    Drug use: No    Sexual activity: Yes   Lifestyle    Physical activity     Days per week: Not on file     Minutes per session: Not on file    Stress: Not on file   Relationships    Social connections     Talks on phone: Not on file     Gets together: Not on file     Attends Jainism service: Not on file     Active member of club or organization: Not on file     Attends meetings of clubs or organizations: Not on file     Relationship status: Not on file    Intimate partner violence     Fear of current or ex partner: Not on file     Emotionally abused: Not on file     Physically abused: Not on file     Forced sexual activity: Not on file   Other Topics Concern    Not on file   Social History Narrative    Not on file     Current Outpatient Medications on File Prior to Visit   Medication Sig Dispense Refill    montelukast (SINGULAIR) 10 MG tablet Take 1 tablet by mouth daily 30 tablet 3    ALPRAZolam (XANAX) 1 MG tablet Take 1 tablet by mouth nightly as needed for Sleep for up to 90 days.  90 tablet 0    DULoxetine (CYMBALTA) 60 MG extended release capsule TAKE 1 CAPSULE BY MOUTH ONE TIME A DAY 90 capsule 0    meloxicam (MOBIC) 15 MG tablet TAKE 1 TABLET BY MOUTH EVERY DAY AFTER A MEAL UNTIL RELIEF 90 tablet 0    omeprazole (PRILOSEC) 20 MG delayed release capsule Take 1 capsule by mouth Daily 30 capsule 2    ipratropium-albuterol (DUONEB) 0.5-2.5 (3) MG/3ML SOLN nebulizer solution Inhale 3 mLs into the lungs every 4 hours as needed for Shortness of Breath 360 mL 2    levothyroxine (SYNTHROID) 25 MCG tablet Take 1 tablet by mouth daily 30 tablet 11    fluticasone-umeclidin-vilant (TRELEGY ELLIPTA) 100-62.5-25 MCG/INH AEPB Inhale 1 puff into the lungs daily 1 each 3    fluticasone (FLONASE) 50 MCG/ACT nasal spray 1 spray by Each Nostril route daily 1 Bottle 3    fluticasone (ARNUITY ELLIPTA) 200 MCG/ACT AEPB Inhale 1 Inhaler into the lungs daily (Patient not taking: Reported on 2/10/2021) 30 each 3     No current facility-administered medications on file prior to visit. Allergies: Iv contrast [iodides], Pcn [penicillins], and Menthol-methyl salicylate    Review of Systems   Constitutional: Negative for chills. Gastrointestinal: Negative for nausea and vomiting. Genitourinary: Positive for dysuria, flank pain, frequency and urgency. Negative for hematuria and hesitancy. Objective:   BP (!) 143/92 (Site: Right Upper Arm, Position: Sitting, Cuff Size: Medium Adult)   Pulse 83   Temp 98.1 °F (36.7 °C) (Temporal)   Ht 4' 11\" (1.499 m)   Wt 138 lb 9.6 oz (62.9 kg)   SpO2 99%   Breastfeeding No   BMI 27.99 kg/m²     Physical Exam  Chaperone present: declined. Constitutional:       Appearance: She is well-developed. HENT:      Head: Normocephalic. Right Ear: External ear normal.      Left Ear: External ear normal.      Nose: Nose normal.      Mouth/Throat:      Mouth: Mucous membranes are moist.      Pharynx: Oropharynx is clear. Eyes:      General:         Right eye: No discharge. Left eye: No discharge. Conjunctiva/sclera: Conjunctivae normal.   Neck:      Musculoskeletal: Normal range of motion. Cardiovascular:      Rate and Rhythm: Normal rate. Pulmonary:      Effort: Pulmonary effort is normal. No respiratory distress. Abdominal:      General: Bowel sounds are normal. There is no distension. Palpations: Abdomen is soft. There is no mass. Tenderness: There is generalized abdominal tenderness and tenderness in the suprapubic area. There is no guarding or rebound. Genitourinary:     Exam position: Supine. Pubic Area: No rash. Labia:         Right: Tenderness present. No rash, lesion or injury. Left: Tenderness present. No rash, lesion or injury. Lymphadenopathy:      Cervical: No cervical adenopathy. Skin:     General: Skin is warm and dry. Neurological:      Mental Status: She is alert and oriented to person, place, and time. Psychiatric:         Mood and Affect: Mood normal.         Behavior: Behavior normal.       Unable to fully insert speculum due to pain- concern for possible bladder prolapse. Assessment:          Diagnosis Orders   1. Acute vaginitis  Wet Prep, Genital   2. Dysuria  POCT Urinalysis No Micro (Auto)    Culture, Urine   3. Incomplete bladder emptying  Diann Melton MD, [facility]   4. Generalized abdominal pain  XR ABDOMEN (KUB) (SINGLE AP VIEW)       Plan:      Orders Placed This Encounter   Procedures    Culture, Urine     Standing Status:   Future     Number of Occurrences:   1     Standing Expiration Date:   2/10/2022     Order Specific Question:   Specify (ex-cath, midstream, cysto, etc)?      Answer:   mid stream    Wet Prep, Genital     Standing Status:   Future     Number of Occurrences:   1     Standing Expiration Date:   2/10/2022    XR ABDOMEN (KUB) (SINGLE AP VIEW)     Standing Status:   Future     Number of Occurrences:   1     Standing Expiration Date:   2/10/2022     Order Specific Question:   Reason for exam:     Answer:   abdominal pain lower abdomen and generalize tenderness   Aixa Camacho MD, OB-GYN, [place]     Referral Priority:   Routine     Referral Type:   Eval and Treat     Referral Reason:   Specialty Services Required     Referred to Provider:   Katrina Chatterjee MD     Requested Specialty:   Obstetrics & Gynecology     Number of Visits Requested:   1    POCT Urinalysis No Micro (Auto)     Results for POC orders placed in visit on 02/10/21 POCT Urinalysis No Micro (Auto)   Result Value Ref Range    Color, UA straw     Clarity, UA clear     Glucose, UA POC neg     Bilirubin, UA neg     Ketones, UA neg     Spec Grav, UA 1.010     Blood, UA POC neg     pH, UA 7.0     Protein, UA POC neg     Urobilinogen, UA 0.2 E.U./dL     Leukocytes, UA neg     Nitrite, UA neg           No orders of the defined types were placed in this encounter. Return as scheduled with pcp. Will send out labs and see results. F/u with PCP and DR. Mcdonald if not improving. Reviewed with the patient: current clinicalstatus, medications, activities and diet. Side effects, adverse effects of the medication prescribedtoday, as well as treatment plan/ rationale and result expectations have been discussedwith the patient who expresses understanding and desires to proceed. Close follow upto evaluate treatment results and for coordination of care. I have reviewedthe patient's medical history in detail and updated the computerized patient record.     Rodríguez Collazo, VALENTIN - CNP

## 2021-02-12 LAB — URINE CULTURE, ROUTINE: NORMAL

## 2021-02-13 ENCOUNTER — HOSPITAL ENCOUNTER (EMERGENCY)
Age: 56
Discharge: HOME | End: 2021-02-13
Attending: EMERGENCY MEDICINE
Payer: COMMERCIAL

## 2021-02-13 ENCOUNTER — APPOINTMENT (OUTPATIENT)
Dept: CT IMAGING | Age: 56
End: 2021-02-13
Payer: COMMERCIAL

## 2021-02-13 VITALS
RESPIRATION RATE: 16 BRPM | WEIGHT: 138 LBS | HEART RATE: 78 BPM | BODY MASS INDEX: 27.82 KG/M2 | DIASTOLIC BLOOD PRESSURE: 76 MMHG | OXYGEN SATURATION: 99 % | SYSTOLIC BLOOD PRESSURE: 129 MMHG | HEIGHT: 59 IN | TEMPERATURE: 96.9 F

## 2021-02-13 DIAGNOSIS — R30.0 DYSURIA: Primary | ICD-10-CM

## 2021-02-13 DIAGNOSIS — R10.9 FLANK PAIN: ICD-10-CM

## 2021-02-13 LAB
ALBUMIN SERPL-MCNC: 4 G/DL (ref 3.5–4.6)
ALP BLD-CCNC: 130 U/L (ref 40–130)
ALT SERPL-CCNC: 29 U/L (ref 0–33)
ANION GAP SERPL CALCULATED.3IONS-SCNC: 9 MEQ/L (ref 9–15)
AST SERPL-CCNC: 26 U/L (ref 0–35)
BACTERIA: NEGATIVE /HPF
BASOPHILS ABSOLUTE: 0 K/UL (ref 0–0.2)
BASOPHILS RELATIVE PERCENT: 0.6 %
BILIRUB SERPL-MCNC: <0.2 MG/DL (ref 0.2–0.7)
BILIRUBIN URINE: NEGATIVE
BLOOD, URINE: NEGATIVE
BUN BLDV-MCNC: 20 MG/DL (ref 6–20)
CALCIUM SERPL-MCNC: 9.7 MG/DL (ref 8.5–9.9)
CHLORIDE BLD-SCNC: 105 MEQ/L (ref 95–107)
CLARITY: CLEAR
CO2: 28 MEQ/L (ref 20–31)
COLOR: YELLOW
CREAT SERPL-MCNC: 0.65 MG/DL (ref 0.5–0.9)
EOSINOPHILS ABSOLUTE: 0.1 K/UL (ref 0–0.7)
EOSINOPHILS RELATIVE PERCENT: 1.9 %
EPITHELIAL CELLS, UA: NORMAL /HPF (ref 0–5)
GFR AFRICAN AMERICAN: >60
GFR NON-AFRICAN AMERICAN: >60
GLOBULIN: 2.9 G/DL (ref 2.3–3.5)
GLUCOSE BLD-MCNC: 109 MG/DL (ref 70–99)
GLUCOSE URINE: NEGATIVE MG/DL
HCT VFR BLD CALC: 38.7 % (ref 37–47)
HEMOGLOBIN: 13 G/DL (ref 12–16)
HYALINE CASTS: NORMAL /HPF (ref 0–5)
KETONES, URINE: NEGATIVE MG/DL
LEUKOCYTE ESTERASE, URINE: ABNORMAL
LYMPHOCYTES ABSOLUTE: 2 K/UL (ref 1–4.8)
LYMPHOCYTES RELATIVE PERCENT: 29.9 %
MCH RBC QN AUTO: 29.4 PG (ref 27–31.3)
MCHC RBC AUTO-ENTMCNC: 33.6 % (ref 33–37)
MCV RBC AUTO: 87.5 FL (ref 82–100)
MONOCYTES ABSOLUTE: 0.7 K/UL (ref 0.2–0.8)
MONOCYTES RELATIVE PERCENT: 10 %
NEUTROPHILS ABSOLUTE: 3.8 K/UL (ref 1.4–6.5)
NEUTROPHILS RELATIVE PERCENT: 57.6 %
NITRITE, URINE: NEGATIVE
PDW BLD-RTO: 13.4 % (ref 11.5–14.5)
PH UA: 7 (ref 5–9)
PLATELET # BLD: 241 K/UL (ref 130–400)
POTASSIUM SERPL-SCNC: 3.6 MEQ/L (ref 3.4–4.9)
PROTEIN UA: NEGATIVE MG/DL
RBC # BLD: 4.43 M/UL (ref 4.2–5.4)
RBC UA: NORMAL /HPF (ref 0–2)
SODIUM BLD-SCNC: 142 MEQ/L (ref 135–144)
SPECIFIC GRAVITY UA: 1.02 (ref 1–1.03)
TOTAL PROTEIN: 6.9 G/DL (ref 6.3–8)
URINE REFLEX TO CULTURE: ABNORMAL
UROBILINOGEN, URINE: 1 E.U./DL
WBC # BLD: 6.7 K/UL (ref 4.8–10.8)
WBC UA: NORMAL /HPF (ref 0–5)

## 2021-02-13 PROCEDURE — 85025 COMPLETE CBC W/AUTO DIFF WBC: CPT

## 2021-02-13 PROCEDURE — 74176 CT ABD & PELVIS W/O CONTRAST: CPT

## 2021-02-13 PROCEDURE — 81001 URINALYSIS AUTO W/SCOPE: CPT

## 2021-02-13 PROCEDURE — 99283 EMERGENCY DEPT VISIT LOW MDM: CPT

## 2021-02-13 PROCEDURE — 96374 THER/PROPH/DIAG INJ IV PUSH: CPT

## 2021-02-13 PROCEDURE — 36415 COLL VENOUS BLD VENIPUNCTURE: CPT

## 2021-02-13 PROCEDURE — 80053 COMPREHEN METABOLIC PANEL: CPT

## 2021-02-13 PROCEDURE — 2580000003 HC RX 258: Performed by: PHYSICIAN ASSISTANT

## 2021-02-13 PROCEDURE — 6360000002 HC RX W HCPCS: Performed by: PHYSICIAN ASSISTANT

## 2021-02-13 RX ORDER — 0.9 % SODIUM CHLORIDE 0.9 %
1000 INTRAVENOUS SOLUTION INTRAVENOUS ONCE
Status: COMPLETED | OUTPATIENT
Start: 2021-02-13 | End: 2021-02-13

## 2021-02-13 RX ORDER — KETOROLAC TROMETHAMINE 30 MG/ML
30 INJECTION, SOLUTION INTRAMUSCULAR; INTRAVENOUS ONCE
Status: COMPLETED | OUTPATIENT
Start: 2021-02-13 | End: 2021-02-13

## 2021-02-13 RX ORDER — OXYCODONE HYDROCHLORIDE AND ACETAMINOPHEN 5; 325 MG/1; MG/1
1 TABLET ORAL EVERY 6 HOURS PRN
Qty: 12 TABLET | Refills: 0 | Status: SHIPPED | OUTPATIENT
Start: 2021-02-13 | End: 2021-02-16

## 2021-02-13 RX ORDER — MORPHINE SULFATE 2 MG/ML
4 INJECTION, SOLUTION INTRAMUSCULAR; INTRAVENOUS
Status: DISCONTINUED | OUTPATIENT
Start: 2021-02-13 | End: 2021-02-13 | Stop reason: HOSPADM

## 2021-02-13 RX ADMIN — SODIUM CHLORIDE 1000 ML: 9 INJECTION, SOLUTION INTRAVENOUS at 14:07

## 2021-02-13 RX ADMIN — KETOROLAC TROMETHAMINE 30 MG: 30 INJECTION, SOLUTION INTRAMUSCULAR at 14:07

## 2021-02-13 ASSESSMENT — ENCOUNTER SYMPTOMS
EYE REDNESS: 0
DIARRHEA: 0
VOMITING: 0
ABDOMINAL PAIN: 0
COLOR CHANGE: 0
SINUS PAIN: 0
RHINORRHEA: 0
SHORTNESS OF BREATH: 0
CHEST TIGHTNESS: 0
EYE DISCHARGE: 0
COUGH: 0
BACK PAIN: 0
NAUSEA: 0

## 2021-02-13 ASSESSMENT — PAIN SCALES - GENERAL
PAINLEVEL_OUTOF10: 9
PAINLEVEL_OUTOF10: 6

## 2021-02-13 NOTE — ED PROVIDER NOTES
[address] [facility] ED  EMERGENCY DEPARTMENT ENCOUNTER      Pt Name: Neville Davis  MRN: [id number]  [place] 1965  Date of evaluation: 2/13/2021  Provider: Cosmo Allen PA-C    CHIEF COMPLAINT       Chief Complaint   Patient presents with    Dysuria         HISTORY OF PRESENT ILLNESS   (Location/Symptom, Timing/Onset, Context/Setting, Quality, Duration, Modifying Factors, Severity)  Note limiting factors. Neville Davis is a 50 y.o. female who presents to the emergency department gradual onset gradual onset, constant, achy, severe, left hip pain along with dysuria, frequency. Patient states this began multiple weeks ago and was seen by her PCP who then referred her to a OB. Patient states that she was diagnosed with a UTI was given multiple courses of antibiotics but still having pain. Patient denies fever, nausea, vomiting, diarrhea. Patient denies difficulty breathing. Patient denies pain. .  Patient denies urinary incontinence. Patient states that the pain radiates down his groin. Patient states nothing makes the pain worse or better. HPI    Nursing Notes were reviewed. REVIEW OF SYSTEMS    (2-9 systems for level 4, 10 or more for level 5)     Review of Systems   Constitutional: Negative for activity change, chills, fatigue and fever. HENT: Negative for congestion, hearing loss, rhinorrhea, sinus pain, sneezing and tinnitus. Eyes: Negative for discharge, redness and visual disturbance. Respiratory: Negative for cough, chest tightness and shortness of breath. Cardiovascular: Negative for chest pain and leg swelling. Gastrointestinal: Negative for abdominal pain, diarrhea, nausea and vomiting. Endocrine: Negative for heat intolerance, polydipsia and polyuria. Genitourinary: Positive for dysuria, flank pain and frequency. Negative for hematuria and urgency. Musculoskeletal: Negative for back pain, joint swelling and myalgias. Skin: Negative for color change, rash and wound. Allergic/Immunologic: Negative for immunocompromised state. Neurological: Negative for tremors, seizures, syncope, light-headedness and headaches. Hematological: Does not bruise/bleed easily. Psychiatric/Behavioral: Negative for agitation and behavioral problems. The patient is not nervous/anxious. Except as noted above the remainder of the review of systems was reviewed and negative. PAST MEDICAL HISTORY     Past Medical History:   Diagnosis Date    Arthritis     Asthma     Chronic fatigue 4/25/2017    Dizziness 4/25/2017    Hypertension     past trx x 1 yr -- off meds > 4 yrs    Hypothyroidism     meds > 3 yrs -- intermittently trx    Lightheadedness 4/25/2017    SOB (shortness of breath) 4/24/2017    Weakness 4/25/2017         SURGICAL HISTORY       Past Surgical History:   Procedure Laterality Date    FINGER TRIGGER RELEASE Left 11/2/2020    LEFT TRIGGER THUMB RELEASE. HAND TRAY performed by Roseline Muniz MD at [address] [place] [address]  2007    preseve ovaries    PARTIAL HYSTERECTOMY  2009         CURRENT MEDICATIONS       Discharge Medication List as of 2/13/2021  3:38 PM      CONTINUE these medications which have NOT CHANGED    Details   montelukast (SINGULAIR) 10 MG tablet Take 1 tablet by mouth daily, Disp-30 tablet, R-3Normal      ALPRAZolam (XANAX) 1 MG tablet Take 1 tablet by mouth nightly as needed for Sleep for up to 90 days. , Disp-90 tablet, R-0Normal      DULoxetine (CYMBALTA) 60 MG extended release capsule TAKE 1 CAPSULE BY MOUTH ONE TIME A DAY, Disp-90 capsule, R-0Normal      meloxicam (MOBIC) 15 MG tablet TAKE 1 TABLET BY MOUTH EVERY DAY AFTER A MEAL UNTIL RELIEF, Disp-90 tablet,R-0Normal      omeprazole (PRILOSEC) 20 MG delayed release capsule Take 1 capsule by mouth Daily, Disp-30 capsule,R-2Normal      fluticasone (ARNUITY ELLIPTA) 200 MCG/ACT AEPB Inhale 1 Inhaler into the lungs daily, Disp-30 each, R-3Normal ipratropium-albuterol (DUONEB) 0.5-2.5 (3) MG/3ML SOLN nebulizer solution Inhale 3 mLs into the lungs every 4 hours as needed for Shortness of Breath, Disp-360 mL, R-2Normal      levothyroxine (SYNTHROID) 25 MCG tablet Take 1 tablet by mouth daily, Disp-30 tablet, R-11Normal      fluticasone-umeclidin-vilant (TRELEGY ELLIPTA) 100-62.5-25 MCG/INH AEPB Inhale 1 puff into the lungs daily, Disp-1 each, R-3Normal      fluticasone (FLONASE) 50 MCG/ACT nasal spray 1 spray by Each Nostril route daily, Disp-1 Bottle, R-3Normal             ALLERGIES     Iv contrast [iodides], Pcn [penicillins], and Menthol-methyl salicylate    FAMILY HISTORY       Family History   Problem Relation Age of Onset    Hypertension Mother     Thyroid Disease Mother     High Blood Pressure Mother     Diabetes Maternal Grandmother     Breast Cancer Sister     Heart Attack Brother          at age 62          SOCIAL HISTORY       Social History     Socioeconomic History    Marital status: Single     Spouse name: None    Number of children: None    Years of education: None    Highest education level: None   Occupational History    None   Social Needs    Financial resource strain: Not very hard    Food insecurity     Worry: Never true     Inability: Never true    Transportation needs     Medical: No     Non-medical: No   Tobacco Use    Smoking status: Former Smoker     Packs/day: 0.10     Years: 13.00     Pack years: 1.30     Types: Cigarettes     Quit date: 10/14/2013     Years since quittin.3    Smokeless tobacco: Never Used   Substance and Sexual Activity    Alcohol use: No    Drug use: No    Sexual activity: Yes   Lifestyle    Physical activity     Days per week: None     Minutes per session: None    Stress: None   Relationships    Social connections     Talks on phone: None     Gets together: None     Attends Latter-day service: None     Active member of club or organization: None General: No focal deficit present. Mental Status: She is alert. Psychiatric:         Mood and Affect: Mood normal.         Behavior: Behavior normal.         DIAGNOSTIC RESULTS     EKG: All EKG's are interpreted by the Emergency Department Physician who either signs or Co-signs this chart in the absence of a cardiologist.        RADIOLOGY:   Non-plain film images such as CT, Ultrasound and MRI are read by the radiologist. Plain radiographic images are visualized and preliminarily interpreted by the emergency physician with the below findings:        Interpretation per the Radiologist below, if available at the time of this note:    CT ABDOMEN PELVIS WO CONTRAST Additional Contrast? None   Final Result      No hydronephrosis or nephrolithiasis, or other acute process in the abdomen/pelvis.            ==========                     ED BEDSIDE ULTRASOUND:   Performed by ED Physician - none    LABS:  Labs Reviewed   URINE RT REFLEX TO CULTURE - Abnormal; Notable for the following components:       Result Value    Leukocyte Esterase, Urine TRACE (*)     All other components within normal limits   COMPREHENSIVE METABOLIC PANEL - Abnormal; Notable for the following components:    Glucose 109 (*)     All other components within normal limits   CBC WITH AUTO DIFFERENTIAL   MICROSCOPIC URINALYSIS       All other labs were within normal range or not returned as of this dictation. EMERGENCY DEPARTMENT COURSE and DIFFERENTIAL DIAGNOSIS/MDM:   Vitals:    Vitals:    02/13/21 1315 02/13/21 1548   BP: (!) 148/88 129/76   Pulse: 75 78   Resp: 16 16   Temp: 96.9 °F (36.1 °C)    TempSrc: Temporal    SpO2: 99% 99%   Weight: 138 lb (62.6 kg)    Height: 4' 11\" (1.499 m)        50-year of age female presents with flank pain, urinary symptoms. CT abdomen pelvis without contrast will be obtained along with CBC, CMP, urine reflex culture. Patient will be reassessed.     MDM      REASSESSMENT On reassessment patient's pain was better controlled. CT exam was negative for any stones, intra-abdominal process. UA was negative for any sign of UTI. CBC was negative for any increased white count or signs of systemic infection. Patient will be discharged home in stable condition medications for pain and close follow-up with her OB. Patient's questions were answered. Patient understands and is agreeable this plan. CONSULTS:  None    PROCEDURES:  Unless otherwise noted below, none     Procedures        FINAL IMPRESSION      1. Dysuria    2. Flank pain          DISPOSITION/PLAN   DISPOSITION Decision To Discharge 02/13/2021 03:36:51 PM      PATIENT REFERRED TO:  VALENTIN Mccall - CNP  [facility] [address]  [address] [facility]  [phone]    Schedule an appointment as soon as possible for a visit   If symptoms worsen      DISCHARGE MEDICATIONS:  Discharge Medication List as of 2/13/2021  3:38 PM      START taking these medications    Details   oxyCODONE-acetaminophen (PERCOCET) 5-325 MG per tablet Take 1 tablet by mouth every 6 hours as needed for Pain for up to 3 days. Intended supply: 3 days. Take lowest dose possible to manage pain, Disp-12 tablet, R-0Print           Controlled Substances Monitoring:     RX Monitoring 7/13/2020   Attestation -   Acute Pain Prescriptions -   Periodic Controlled Substance Monitoring No signs of potential drug abuse or diversion identified. ;Possible medication side effects, risk of tolerance/dependence & alternative treatments discussed. ;Assessed functional status. Chronic Pain > 50 MEDD Obtained or confirmed \"Consent for Opioid Use\" on file.        (Please note that portions of this note were completed with a voice recognition program.  Efforts were made to edit the dictations but occasionally words are mis-transcribed.)    Girma Guillermo PA-C (electronically signed)             Girma Guillermo PA-C  02/13/21 [id number]

## 2021-02-26 DIAGNOSIS — F41.9 ANXIETY: ICD-10-CM

## 2021-02-26 RX ORDER — DULOXETIN HYDROCHLORIDE 60 MG/1
CAPSULE, DELAYED RELEASE ORAL
Qty: 90 CAPSULE | Refills: 0 | Status: SHIPPED | OUTPATIENT
Start: 2021-02-26 | End: 2021-06-04

## 2021-03-05 ENCOUNTER — OFFICE VISIT (OUTPATIENT)
Dept: OBGYN CLINIC | Age: 56
End: 2021-03-05
Payer: COMMERCIAL

## 2021-03-05 VITALS
HEIGHT: 59 IN | DIASTOLIC BLOOD PRESSURE: 80 MMHG | HEART RATE: 84 BPM | SYSTOLIC BLOOD PRESSURE: 112 MMHG | BODY MASS INDEX: 27.82 KG/M2 | WEIGHT: 138 LBS

## 2021-03-05 DIAGNOSIS — R30.0 DYSURIA: ICD-10-CM

## 2021-03-05 DIAGNOSIS — N39.41 URGE INCONTINENCE OF URINE: Primary | ICD-10-CM

## 2021-03-05 LAB
BILIRUBIN URINE: NEGATIVE
BLOOD, URINE: NEGATIVE
CLARITY: CLEAR
COLOR: ABNORMAL
GLUCOSE URINE: NEGATIVE MG/DL
KETONES, URINE: ABNORMAL MG/DL
LEUKOCYTE ESTERASE, URINE: NEGATIVE
NITRITE, URINE: NEGATIVE
PH UA: 6 (ref 5–9)
PROTEIN UA: NEGATIVE MG/DL
SPECIFIC GRAVITY UA: 1.02 (ref 1–1.03)
UROBILINOGEN, URINE: 0.2 E.U./DL

## 2021-03-05 PROCEDURE — 99203 OFFICE O/P NEW LOW 30 MIN: CPT | Performed by: OBSTETRICS & GYNECOLOGY

## 2021-03-05 RX ORDER — OXYBUTYNIN CHLORIDE 10 MG/1
10 TABLET, EXTENDED RELEASE ORAL DAILY
Qty: 30 TABLET | Refills: 1 | Status: SHIPPED
Start: 2021-03-05 | End: 2021-04-13 | Stop reason: CLARIF

## 2021-03-05 NOTE — PROGRESS NOTES
Hannah Potts is a 49 y.o. female who presents here today for complaints of complains of urinary urgency, frequency every 1-2 hours, nocturia 1-3 times per night, patient also complains of urinary leakage if she cannot make it to the bathroom in time. Multiple negative urine cultures and analysis noted. Patient mentions that the symptoms have been going on over the past 4 months. .      Vitals:  /80 (Site: Left Upper Arm, Position: Sitting, Cuff Size: Medium Adult)   Pulse 84   Ht 4' 11\" (1.499 m)   Wt 138 lb (62.6 kg)   BMI 27.87 kg/m²   Allergies: Iv contrast [iodides], Pcn [penicillins], and Menthol-methyl salicylate  Past Medical History:   Diagnosis Date    Arthritis     Asthma     Chronic fatigue 2017    Dizziness 2017    Hypertension     past trx x 1 yr -- off meds > 4 yrs    Hypothyroidism     meds > 3 yrs -- intermittently trx    Lightheadedness 2017    SOB (shortness of breath) 2017    Weakness 2017     Past Surgical History:   Procedure Laterality Date    FINGER TRIGGER RELEASE Left 2020    LEFT TRIGGER THUMB RELEASE. HAND TRAY performed by Minerva Lira MD at [address] [facility]      preseve ovaries    PARTIAL HYSTERECTOMY       OB History    No obstetric history on file.        Family History   Problem Relation Age of Onset    Hypertension Mother     Thyroid Disease Mother     High Blood Pressure Mother     Diabetes Maternal Grandmother     Breast Cancer Sister     Heart Attack Brother          at age 62     Social History     Socioeconomic History    Marital status: Single     Spouse name: Not on file    Number of children: Not on file    Years of education: Not on file    Highest education level: Not on file   Occupational History    Not on file   Social Needs    Financial resource strain: Not very hard    Food insecurity     Worry: Never true     Inability: Never true    Transportation needs     Medical: No Non-medical: No   Tobacco Use    Smoking status: Former Smoker     Packs/day: 0.10     Years: 13.00     Pack years: 1.30     Types: Cigarettes     Quit date: 10/14/2013     Years since quittin.3    Smokeless tobacco: Never Used   Substance and Sexual Activity    Alcohol use: No    Drug use: No    Sexual activity: Yes   Lifestyle    Physical activity     Days per week: Not on file     Minutes per session: Not on file    Stress: Not on file   Relationships    Social connections     Talks on phone: Not on file     Gets together: Not on file     Attends [employer] service: Not on file     Active member of club or organization: Not on file     Attends meetings of clubs or organizations: Not on file     Relationship status: Not on file    Intimate partner violence     Fear of current or ex partner: Not on file     Emotionally abused: Not on file     Physically abused: Not on file     Forced sexual activity: Not on file   Other Topics Concern    Not on file   Social History Narrative    Not on file       Contraceptive method:  none    Patient's medications, allergies, past medical, surgical, social and family histories were reviewed and updated as appropriate. Review of Systems  As per chief complaint   All other systems reviewed and are negative. Urinary urgency, urinary frequency, urinary leakage, nocturia  Physical Exam:  Vitals:  /80 (Site: Left Upper Arm, Position: Sitting, Cuff Size: Medium Adult)   Pulse 84   Ht 4' 11\" (1.499 m)   Wt 138 lb (62.6 kg)   BMI 27.87 kg/m²   Lungs: CTAB   Heart : Regular S1/S2, no M/R/G  Abdomen: Soft , NT, ND , + BS   Pelvic exam : DEFERRED    Assessment:      Diagnosis Orders   1. Urge incontinence of urine     2.  Dysuria  Urinalysis    Culture, Urine       Plan:     Signs consistent with overactive bladder/urge urinary incontinence  Patient started on a trial of oxybutynin  Patient to return in 2 weeks for reassessment      Orders Placed This Encounter Procedures    Culture, Urine     Standing Status:   Future     Number of Occurrences:   1     Standing Expiration Date:   3/5/2022     Order Specific Question:   Specify (ex-cath, midstream, cysto, etc)? Answer:   midstream    Urinalysis     Standing Status:   Future     Number of Occurrences:   1     Standing Expiration Date:   3/5/2022     Orders Placed This Encounter   Medications    oxybutynin (DITROPAN XL) 10 MG extended release tablet     Sig: Take 1 tablet by mouth daily     Dispense:  30 tablet     Refill:  1       Follow Up:  No follow-ups on file.         Soumya Kimball MD

## 2021-03-07 LAB — URINE CULTURE, ROUTINE: NORMAL

## 2021-03-24 ENCOUNTER — OFFICE VISIT (OUTPATIENT)
Dept: OBGYN CLINIC | Age: 56
End: 2021-03-24
Payer: COMMERCIAL

## 2021-03-24 VITALS
HEART RATE: 72 BPM | HEIGHT: 59 IN | DIASTOLIC BLOOD PRESSURE: 84 MMHG | WEIGHT: 136 LBS | BODY MASS INDEX: 27.42 KG/M2 | SYSTOLIC BLOOD PRESSURE: 132 MMHG

## 2021-03-24 DIAGNOSIS — N39.41 URGE INCONTINENCE OF URINE: Primary | ICD-10-CM

## 2021-03-24 PROCEDURE — 99213 OFFICE O/P EST LOW 20 MIN: CPT | Performed by: OBSTETRICS & GYNECOLOGY

## 2021-03-24 RX ORDER — OXYBUTYNIN CHLORIDE 15 MG/1
15 TABLET, EXTENDED RELEASE ORAL DAILY
Qty: 30 TABLET | Refills: 0 | Status: SHIPPED | OUTPATIENT
Start: 2021-03-24 | End: 2021-05-04 | Stop reason: SDUPTHER

## 2021-03-24 NOTE — PROGRESS NOTES
Medication FollowUp     Hannah Potts is a 49y.o. year old female here todiscuss symptoms after use of medications prescribed last visit. Symptoms urge urinary symptoms. Medication/s prescribed oxybutynin XL 10 mg p.o. daily. Side effects reported : none. Mentions symptomsimproved : yes -80% improvement in symptoms according to patient. Vitals:  /84 (Site: Left Upper Arm, Position: Sitting, Cuff Size: Medium Adult)   Pulse 72   Ht 4' 11\" (1.499 m)   Wt 136 lb (61.7 kg)   BMI 27.47 kg/m²   Allergies: Iv contrast [iodides], Pcn [penicillins], and Menthol-methyl salicylate  Past Medical History:   Diagnosis Date    Arthritis     Asthma     Chronic fatigue 2017    Dizziness 2017    Hypertension     past trx x 1 yr -- off meds > 4 yrs    Hypothyroidism     meds > 3 yrs -- intermittently trx    Lightheadedness 2017    SOB (shortness of breath) 2017    Weakness 2017        Past Surgical History:   Procedure Laterality Date    FINGER TRIGGER RELEASE Left 2020    LEFT TRIGGER THUMB RELEASE. HAND TRAY performed by Minerva Lira MD at [address] [place] [address]      preseve ovaries    PARTIAL HYSTERECTOMY       OB History    No obstetric history on file.        Family History   Problem Relation Age of Onset    Hypertension Mother     Thyroid Disease Mother     High Blood Pressure Mother     Diabetes Maternal Grandmother     Breast Cancer Sister     Heart Attack Brother          at age 62     Social History     Socioeconomic History    Marital status: Single     Spouse name: Not on file    Number of children: Not on file    Years of education: Not on file    Highest education level: Not on file   Occupational History    Not on file   Social Needs    Financial resource strain: Not very hard    Food insecurity     Worry: Never true     Inability: Never true    Transportation needs     Medical: No     Non-medical: No   Tobacco Use    Smoking status: Former Smoker     Packs/day: 0.10     Years: 13.00     Pack years: 1.30     Types: Cigarettes     Quit date: 10/14/2013     Years since quittin.4    Smokeless tobacco: Never Used   Substance and Sexual Activity    Alcohol use: No    Drug use: No    Sexual activity: Yes   Lifestyle    Physical activity     Days per week: Not on file     Minutes per session: Not on file    Stress: Not on file   Relationships    Social connections     Talks on phone: Not on file     Gets together: Not on file     Attends Confucianism service: Not on file     Active member of club or organization: Not on file     Attends meetings of clubs or organizations: Not on file     Relationship status: Not on file    Intimate partner violence     Fear of current or ex partner: Not on file     Emotionally abused: Not on file     Physically abused: Not on file     Forced sexual activity: Not on file   Other Topics Concern    Not on file   Social History Narrative    Not on file         Patient's medications,allergies, past medical, surgical, social and family histories were reviewed andupdated as appropriate. Current Outpatient Medications:     oxybutynin (DITROPAN XL) 15 MG extended release tablet, Take 1 tablet by mouth daily, Disp: 30 tablet, Rfl: 0    oxybutynin (DITROPAN XL) 10 MG extended release tablet, Take 1 tablet by mouth daily, Disp: 30 tablet, Rfl: 1    DULoxetine (CYMBALTA) 60 MG extended release capsule, TAKE 1 CAPSULE BY MOUTH ONE TIME A DAY, Disp: 90 capsule, Rfl: 0    montelukast (SINGULAIR) 10 MG tablet, Take 1 tablet by mouth daily, Disp: 30 tablet, Rfl: 3    ALPRAZolam (XANAX) 1 MG tablet, Take 1 tablet by mouth nightly as needed for Sleep for up to 90 days. , Disp: 90 tablet, Rfl: 0    meloxicam (MOBIC) 15 MG tablet, TAKE 1 TABLET BY MOUTH EVERY DAY AFTER A MEAL UNTIL RELIEF, Disp: 90 tablet, Rfl: 0    omeprazole (PRILOSEC) 20 MG delayed release capsule, Take 1 capsule by mouth Daily, Disp: 30

## 2021-04-13 ENCOUNTER — OFFICE VISIT (OUTPATIENT)
Dept: FAMILY MEDICINE CLINIC | Age: 56
End: 2021-04-13
Payer: COMMERCIAL

## 2021-04-13 VITALS
DIASTOLIC BLOOD PRESSURE: 70 MMHG | HEART RATE: 74 BPM | WEIGHT: 130 LBS | HEIGHT: 59 IN | RESPIRATION RATE: 16 BRPM | BODY MASS INDEX: 26.21 KG/M2 | SYSTOLIC BLOOD PRESSURE: 130 MMHG

## 2021-04-13 DIAGNOSIS — E03.9 ACQUIRED HYPOTHYROIDISM: ICD-10-CM

## 2021-04-13 DIAGNOSIS — R53.82 CHRONIC FATIGUE: ICD-10-CM

## 2021-04-13 DIAGNOSIS — F51.01 PRIMARY INSOMNIA: Primary | ICD-10-CM

## 2021-04-13 DIAGNOSIS — R73.09 ELEVATED RANDOM BLOOD GLUCOSE LEVEL: ICD-10-CM

## 2021-04-13 DIAGNOSIS — F41.9 ANXIETY: ICD-10-CM

## 2021-04-13 LAB
HBA1C MFR BLD: 5.5 % (ref 4.8–5.9)
T4 FREE: 1.16 NG/DL (ref 0.84–1.68)
TSH SERPL DL<=0.05 MIU/L-ACNC: 1.18 UIU/ML (ref 0.44–3.86)

## 2021-04-13 PROCEDURE — 99214 OFFICE O/P EST MOD 30 MIN: CPT | Performed by: NURSE PRACTITIONER

## 2021-04-13 RX ORDER — ALPRAZOLAM 1 MG/1
1 TABLET ORAL NIGHTLY PRN
Qty: 90 TABLET | Refills: 0 | Status: SHIPPED | OUTPATIENT
Start: 2021-04-13 | End: 2021-07-12

## 2021-04-13 ASSESSMENT — ENCOUNTER SYMPTOMS
HOARSE VOICE: 0
WHEEZING: 0
GLOBUS SENSATION: 0
CHOKING: 0
VOMITING: 0
SORE THROAT: 0
WATER BRASH: 0
STRIDOR: 0
NAUSEA: 0
COUGH: 0
ABDOMINAL PAIN: 0
HEARTBURN: 0
VISUAL CHANGE: 0
CHANGE IN BOWEL HABIT: 0
BACK PAIN: 1
SWOLLEN GLANDS: 0
BELCHING: 0

## 2021-04-13 NOTE — PROGRESS NOTES
foods. Associated symptoms include fatigue. Pertinent negatives include no anemia, melena, muscle weakness, orthopnea or weight loss. There are no known risk factors. She has tried nothing for the symptoms. Past procedures do not include an abdominal ultrasound, an EGD, esophageal manometry, esophageal pH monitoring, H. pylori antibody titer or a UGI. Past invasive treatments do not include gastroplasty, gastroplication or reflux surgery. Fatigue  This is a chronic problem. The current episode started more than 1 year ago. The problem occurs constantly. The problem has been unchanged. Associated symptoms include arthralgias, fatigue, myalgias and urinary symptoms. Pertinent negatives include no abdominal pain, anorexia, change in bowel habit, chest pain, chills, congestion, coughing, diaphoresis, fever, headaches, joint swelling, nausea, neck pain, numbness, rash, sore throat, swollen glands, vertigo, visual change, vomiting or weakness. Nothing aggravates the symptoms. Treatments tried: Multiple lab work-ups. The treatment provided no relief. Review of Systems   Constitutional: Positive for fatigue. Negative for chills, diaphoresis, fever and weight loss. HENT: Negative for congestion, hoarse voice and sore throat. Respiratory: Negative for cough, choking and wheezing. Cardiovascular: Negative for chest pain. Gastrointestinal: Negative for abdominal pain, anorexia, change in bowel habit, dysphagia, heartburn, melena, nausea and vomiting. Genitourinary: Positive for frequency and urgency. Musculoskeletal: Positive for arthralgias, back pain and myalgias. Negative for joint swelling, muscle weakness and neck pain. Skin: Negative for rash. Neurological: Negative for vertigo, weakness, numbness and headaches. Physical Exam  Constitutional:       General: She is not in acute distress. Appearance: She is well-developed. HENT:      Head: Normocephalic and atraumatic.       Right Ear: Tympanic membrane and external ear normal.      Left Ear: Tympanic membrane and external ear normal.      Nose: Nose normal.   Eyes:      Conjunctiva/sclera: Conjunctivae normal.      Pupils: Pupils are equal, round, and reactive to light. Neck:      Musculoskeletal: Normal range of motion and neck supple. Thyroid: Thyromegaly present. No thyroid mass or thyroid tenderness. Vascular: No JVD. Cardiovascular:      Rate and Rhythm: Normal rate and regular rhythm. Pulses: Normal pulses. Heart sounds: Normal heart sounds. No murmur. Pulmonary:      Effort: Pulmonary effort is normal. No respiratory distress. Breath sounds: Normal breath sounds. No wheezing or rales. Abdominal:      General: Bowel sounds are normal. There is no distension. Palpations: Abdomen is soft. There is no mass. Tenderness: There is no abdominal tenderness. Lymphadenopathy:      Cervical: No cervical adenopathy. Skin:     General: Skin is warm and dry. Capillary Refill: Capillary refill takes less than 2 seconds. Neurological:      Mental Status: She is alert and oriented to person, place, and time. On this date 04/13/21 I have spent 30 minutes reviewing previous notes, test results and face to face with the patient discussing the diagnosis and importance of compliance with the treatment plan as well as documenting on the day of the visit. An electronic signature was used to authenticate this note.     --VALENTIN Godoy - CNP

## 2021-05-03 RX ORDER — LEVOTHYROXINE SODIUM 0.03 MG/1
25 TABLET ORAL DAILY
Qty: 30 TABLET | Refills: 11 | Status: SHIPPED | OUTPATIENT
Start: 2021-05-03 | End: 2022-05-17 | Stop reason: SDUPTHER

## 2021-05-04 ENCOUNTER — PATIENT MESSAGE (OUTPATIENT)
Dept: OBGYN CLINIC | Age: 56
End: 2021-05-04

## 2021-05-04 RX ORDER — OXYBUTYNIN CHLORIDE 15 MG/1
15 TABLET, EXTENDED RELEASE ORAL DAILY
Qty: 30 TABLET | Refills: 3 | Status: SHIPPED | OUTPATIENT
Start: 2021-05-04 | End: 2021-10-18

## 2021-05-04 NOTE — TELEPHONE ENCOUNTER
From: Lety Hernandez  To: Darin Dean MD  Sent: 5/4/2021 4:01 PM EDT  Subject: Roxy Kussmaul Dr. Leana Monte I need a refill of oxybutynin cloride 15 mg please sent to [facility] pharmacy thank you

## 2021-05-20 DIAGNOSIS — K21.9 GASTROESOPHAGEAL REFLUX DISEASE: ICD-10-CM

## 2021-05-20 RX ORDER — OMEPRAZOLE 20 MG/1
CAPSULE, DELAYED RELEASE ORAL
Qty: 30 CAPSULE | Refills: 2 | Status: SHIPPED | OUTPATIENT
Start: 2021-05-20 | End: 2022-01-19

## 2021-06-04 DIAGNOSIS — F41.9 ANXIETY: ICD-10-CM

## 2021-06-04 RX ORDER — DULOXETIN HYDROCHLORIDE 60 MG/1
CAPSULE, DELAYED RELEASE ORAL
Qty: 90 CAPSULE | Refills: 0 | Status: CANCELLED | OUTPATIENT
Start: 2021-06-04

## 2021-06-07 RX ORDER — DULOXETIN HYDROCHLORIDE 60 MG/1
CAPSULE, DELAYED RELEASE ORAL
Qty: 90 CAPSULE | Refills: 1 | Status: SHIPPED | OUTPATIENT
Start: 2021-06-07 | End: 2022-09-02 | Stop reason: SDUPTHER

## 2021-06-28 ENCOUNTER — OFFICE VISIT (OUTPATIENT)
Dept: ORTHOPEDIC SURGERY | Age: 56
End: 2021-06-28
Payer: COMMERCIAL

## 2021-06-28 VITALS
HEIGHT: 59 IN | TEMPERATURE: 97.1 F | BODY MASS INDEX: 26.21 KG/M2 | OXYGEN SATURATION: 99 % | WEIGHT: 130 LBS | HEART RATE: 68 BPM

## 2021-06-28 DIAGNOSIS — M65.311 TRIGGER THUMB OF RIGHT HAND: Primary | ICD-10-CM

## 2021-06-28 PROCEDURE — 99214 OFFICE O/P EST MOD 30 MIN: CPT | Performed by: ORTHOPAEDIC SURGERY

## 2021-06-29 NOTE — PROGRESS NOTES
Food Insecurity    Worried About Running Out of Food in the Last Year: Never true    Gerard of Food in the Last Year: Never true   Transportation Needs: No Transportation Needs    Lack of Transportation (Medical): No    Lack of Transportation (Non-Medical): No   Physical Activity:     Days of Exercise per Week:     Minutes of Exercise per Session:    Stress:     Feeling of Stress :    Social Connections:     Frequency of Communication with Friends and Family:     Frequency of Social Gatherings with Friends and Family:     Attends Judaism Services:     Active Member of Clubs or Organizations:     Attends Club or Organization Meetings:     Marital Status:    Intimate Partner Violence:     Fear of Current or Ex-Partner:     Emotionally Abused:     Physically Abused:     Sexually Abused:      Family History   Problem Relation Age of Onset    Hypertension Mother     Thyroid Disease Mother     High Blood Pressure Mother     Diabetes Maternal Grandmother     Breast Cancer Sister     Heart Attack Brother          at age 62     Allergies   Allergen Reactions    Iv Contrast [Iodides] Hives     After injection of isovue 370 75 ml patient developed hives. Two noted total on forehead and right temporal area.  Pcn [Penicillins] Other (See Comments)     syncope  Syncope     Menthol-Methyl Salicylate Rash     Current Outpatient Medications on File Prior to Visit   Medication Sig Dispense Refill    DULoxetine (CYMBALTA) 60 MG extended release capsule TAKE 1 CAPSULE BY MOUTH ONE TIME A DAY 90 capsule 1    omeprazole (PRILOSEC) 20 MG delayed release capsule TAKE 1 CAPSULE BY MOUTH ONE TIME A DAY 30 capsule 2    oxybutynin (DITROPAN XL) 15 MG extended release tablet Take 1 tablet by mouth daily 30 tablet 3    levothyroxine (SYNTHROID) 25 MCG tablet Take 1 tablet by mouth daily 30 tablet 11    ALPRAZolam (XANAX) 1 MG tablet Take 1 tablet by mouth nightly as needed for Sleep for up to 90 days.  80 Component Value Date    CRP 1.3 07/15/2019       Assessment:      Trigger thumb right hand     Plan:     Patient has now progressed to more symptomatic right trigger thumb. This is beginning to interfere with her daily activities and causing her persistent pain and is now using the thumb less as a result of this. Discussion was had with patient again regarding interventions including steroid injection of the A1 pulley and bracing and splinting. Patient states that she had such a good result from prior trigger thumb release that she would like to proceed with surgical intervention on this right thumb as well. Risk and benefits of operative intervention were discussed with patient including not limited to damage to nerves, tendons, vessels, persistent pain, retriggering, infection, need for revision surgery with goals of surgery being to incise the A1 pulley to allow for better gliding of the tendon to prevent triggering phenomenon. With knowledge of these risks and benefits patient is electing to proceed.     Amber Mcbride MD            Surgery Phone: [phone]   [facility]   Surgery Fax: [phone]    Phone: [phone]          Fax: [phone]: Surgery Scheduling, PAT & PRE-OP Order Form  Call to advance [facility] at [phone] at least 24 hours prior to date of service     Surgery Location: [facility] Surgery: [address], [address] [facility]  Delmis Barragan MD Surgery Date: 21  Time: to follow other case   Patient's Name: Ash Fernandez : 1965    Gender: female   Home Phone:  [phone] Cell Phone: [phone]    #:  [id number]  Emergency Contact:  Hernandez Craig   Phone: [phone]  Payor: Tamika Baeza /  /  /    ID No.: [id number]      PROVIDER TO COMPLETE:  Diagnosis: Right Trigger Thumb  Procedure/Consent: A1 Pulley release of the right thumb  Case Comments/Implants: hand tray   Surgery Scheduled as: Outpatient  Anesthesia Requested: Mitchell White  Referring Family Doctor: VALENTIN Mixon CNP  [x] [facility] PAT Date/Time:                                                            [x] History & Physical [] Physician will Provide [] Attached [] Dictated [] Other  [x] Follow Anesthesia Pre-Op Orders for X-rays, [facility]     [x] SN & PT to evaluate and treat/educate disease management, medications, home safety & equipment needs for total joint patients  [] Other: ____________________________________________________  Consults: Medical/Cardiac Clearance done by  ____________________  PRE-OP ORDERS:   Allergies:  Iv contrast [iodides], Pcn [penicillins], and Menthol-methyl salicylate Latex Allergies:             Diabetic:           [] IV ________________________  [x] IV Start with J-loop     Preprinted Orders: Attached [] Yes [] No   ANTIBIOTIC PRE-OP: [x] ANCEF 2 gram IVPB if > 120 kg 3 grams IVPB within 1 hour of incision, if ALLERGIC, use VANCOMYCIN 1 gram IV, 2 hours pre-op  [] TXA Protocol [] Other:   [x] NPO   [] Betablocker (if needed) _____________________________________   [] Knee high anti-embolic hose [] Thigh high anti-embolic hose   Other: ______________________________________________________    Physician Signature Required:    Date/Time: 6/29/2021

## 2021-07-07 ENCOUNTER — TELEPHONE (OUTPATIENT)
Dept: ORTHOPEDIC SURGERY | Age: 56
End: 2021-07-07

## 2021-07-12 ENCOUNTER — OFFICE VISIT (OUTPATIENT)
Dept: ORTHOPEDIC SURGERY | Age: 56
End: 2021-07-12
Payer: COMMERCIAL

## 2021-07-12 VITALS
OXYGEN SATURATION: 99 % | DIASTOLIC BLOOD PRESSURE: 77 MMHG | BODY MASS INDEX: 25.6 KG/M2 | HEIGHT: 59 IN | SYSTOLIC BLOOD PRESSURE: 120 MMHG | RESPIRATION RATE: 14 BRPM | HEART RATE: 67 BPM | TEMPERATURE: 97.7 F | WEIGHT: 127 LBS

## 2021-07-12 DIAGNOSIS — Z01.818 PREOPERATIVE EXAMINATION: ICD-10-CM

## 2021-07-12 DIAGNOSIS — Z01.818 PREOPERATIVE EXAMINATION: Primary | ICD-10-CM

## 2021-07-12 LAB
ANION GAP SERPL CALCULATED.3IONS-SCNC: 8 MEQ/L (ref 9–15)
BUN BLDV-MCNC: 13 MG/DL (ref 6–20)
CALCIUM SERPL-MCNC: 9.6 MG/DL (ref 8.5–9.9)
CHLORIDE BLD-SCNC: 105 MEQ/L (ref 95–107)
CO2: 30 MEQ/L (ref 20–31)
CREAT SERPL-MCNC: 0.71 MG/DL (ref 0.5–0.9)
GFR AFRICAN AMERICAN: >60
GFR NON-AFRICAN AMERICAN: >60
GLUCOSE BLD-MCNC: 95 MG/DL (ref 70–99)
HCG(URINE) PREGNANCY TEST: NEGATIVE
HCT VFR BLD CALC: 39.7 % (ref 37–47)
HEMOGLOBIN: 13.1 G/DL (ref 12–16)
MCH RBC QN AUTO: 29.3 PG (ref 27–31.3)
MCHC RBC AUTO-ENTMCNC: 33 % (ref 33–37)
MCV RBC AUTO: 88.8 FL (ref 82–100)
PDW BLD-RTO: 13.3 % (ref 11.5–14.5)
PLATELET # BLD: 223 K/UL (ref 130–400)
POTASSIUM SERPL-SCNC: 4.7 MEQ/L (ref 3.4–4.9)
RBC # BLD: 4.48 M/UL (ref 4.2–5.4)
SODIUM BLD-SCNC: 143 MEQ/L (ref 135–144)
WBC # BLD: 7.8 K/UL (ref 4.8–10.8)

## 2021-07-12 PROCEDURE — 99203 OFFICE O/P NEW LOW 30 MIN: CPT | Performed by: PHYSICIAN ASSISTANT

## 2021-07-12 PROCEDURE — 93000 ELECTROCARDIOGRAM COMPLETE: CPT | Performed by: PHYSICIAN ASSISTANT

## 2021-07-12 PROCEDURE — 93010 ELECTROCARDIOGRAM REPORT: CPT | Performed by: PHYSICIAN ASSISTANT

## 2021-07-12 NOTE — PROGRESS NOTES
[place] [address] [facility]    H&P: Preadmission Testing     Patient: Kaylee Carpio  YOB: 1965  MRN: [id number]    Subjective:     Chief Complaint   Patient presents with    Pre-op Exam     PAT A1 PULLEY RELEASE OF THE RIGHT THUMB w/ Lynn Ardon 7/19       HPI: Kaylee Carpio is a 49 y.o. female w/ pertinent PMHx of anxiety,   Here for preop evaluation for trigger release at the right thumb. Aydin Sebastian is a very healthy lady. She has no heart disease. Never had a heart attack. No chest pain shortness of breath difficulty with ambulation or exercise. She has no lung disease. She is a former smoker, no COPD. She has no issues with breathing, no wheezing. She had Covid in April. She has not been vaccinated at test was performed today in office. She is not diabetic. She has no kidney disease. She never had a blood clot. Not any blood thinners            Past Medical History:        Diagnosis Date    Arthritis     Asthma     Chronic fatigue 4/25/2017    Dizziness 4/25/2017    Hypertension     past trx x 1 yr -- off meds > 4 yrs    Hypothyroidism     meds > 3 yrs -- intermittently trx    Lightheadedness 4/25/2017    SOB (shortness of breath) 4/24/2017    Weakness 4/25/2017     Past Surgical History:    Past Surgical History:   Procedure Laterality Date    FINGER TRIGGER RELEASE Left 11/2/2020    LEFT TRIGGER THUMB RELEASE.  HAND TRAY performed by Adolph Thornton MD at [address] [facility]  2007    preseve ovaries    PARTIAL HYSTERECTOMY  2009       Medications Prior to Admission:    Current Outpatient Medications   Medication Sig Dispense Refill    DULoxetine (CYMBALTA) 60 MG extended release capsule TAKE 1 CAPSULE BY MOUTH ONE TIME A DAY 90 capsule 1    omeprazole (PRILOSEC) 20 MG delayed release capsule TAKE 1 CAPSULE BY MOUTH ONE TIME A DAY 30 capsule 2    oxybutynin (DITROPAN XL) 15 MG extended release tablet Take 1 tablet by mouth daily 30 tablet 3    levothyroxine (SYNTHROID) 25 MCG tablet Take 1 tablet by mouth daily 30 tablet 11    ALPRAZolam (XANAX) 1 MG tablet Take 1 tablet by mouth nightly as needed for Sleep for up to 90 days. 90 tablet 0    montelukast (SINGULAIR) 10 MG tablet Take 1 tablet by mouth daily 30 tablet 3    meloxicam (MOBIC) 15 MG tablet TAKE 1 TABLET BY MOUTH EVERY DAY AFTER A MEAL UNTIL RELIEF 90 tablet 0    fluticasone (ARNUITY ELLIPTA) 200 MCG/ACT AEPB Inhale 1 Inhaler into the lungs daily 30 each 3    ipratropium-albuterol (DUONEB) 0.5-2.5 (3) MG/3ML SOLN nebulizer solution Inhale 3 mLs into the lungs every 4 hours as needed for Shortness of Breath 360 mL 2    fluticasone-umeclidin-vilant (TRELEGY ELLIPTA) 100-62.5-25 MCG/INH AEPB Inhale 1 puff into the lungs daily 1 each 3     No current facility-administered medications for this visit. Allergies:     Iv contrast [iodides], Pcn [penicillins], and Menthol-methyl salicylate    Social History:   Social History     Socioeconomic History    Marital status: Single     Spouse name: Not on file    Number of children: Not on file    Years of education: Not on file    Highest education level: Not on file   Occupational History    Not on file   Tobacco Use    Smoking status: Former Smoker     Packs/day: 0.10     Years: 13.00     Pack years: 1.30     Types: Cigarettes     Quit date: 10/14/2013     Years since quittin.7    Smokeless tobacco: Never Used   Vaping Use    Vaping Use: Never used   Substance and Sexual Activity    Alcohol use: No    Drug use: No    Sexual activity: Yes   Other Topics Concern    Not on file   Social History Narrative    Not on file     Social Determinants of Health     Financial Resource Strain: Low Risk     Difficulty of Paying Living Expenses: Not very hard   Food Insecurity: No Food Insecurity    Worried About Running Out of Food in the Last Year: Never true    Gerard of Food in the Last Year: Never true   Transportation Needs: No Transportation Needs    Lack of Transportation (Medical): No    Lack of Transportation (Non-Medical): No   Physical Activity:     Days of Exercise per Week:     Minutes of Exercise per Session:    Stress:     Feeling of Stress :    Social Connections:     Frequency of Communication with Friends and Family:     Frequency of Social Gatherings with Friends and Family:     Attends Moravian Services:     Active Member of Clubs or Organizations:     Attends Club or Organization Meetings:     Marital Status:    Intimate Partner Violence:     Fear of Current or Ex-Partner:     Emotionally Abused:     Physically Abused:     Sexually Abused:        Family History:       Problem Relation Age of Onset    Hypertension Mother     Thyroid Disease Mother     High Blood Pressure Mother     Diabetes Maternal Grandmother     Breast Cancer Sister     Heart Attack Brother          at age 62       Objective:   Ht 4' 11\" (1.499 m)   BMI 26.26 kg/m²     Physical Exam  Constitutional:       General: She is not in acute distress. Appearance: Normal appearance. She is not ill-appearing. HENT:      Head: Normocephalic. Nose: Nose normal. No congestion or rhinorrhea. Mouth/Throat:      Mouth: Mucous membranes are moist.      Pharynx: Oropharynx is clear. No oropharyngeal exudate or posterior oropharyngeal erythema. Eyes:      Extraocular Movements: Extraocular movements intact. Pupils: Pupils are equal, round, and reactive to light. Cardiovascular:      Rate and Rhythm: Normal rate and regular rhythm. Pulses: Normal pulses. Heart sounds: Normal heart sounds. Pulmonary:      Effort: Pulmonary effort is normal.      Breath sounds: Normal breath sounds. No wheezing, rhonchi or rales. Abdominal:      General: Abdomen is flat. Bowel sounds are normal.      Palpations: Abdomen is soft. Tenderness: There is no abdominal tenderness. Skin:     General: Skin is warm and dry. Capillary Refill: Capillary refill takes less than 2 seconds. Comments: No swelling or erythema over the incision site   Neurological:      General: No focal deficit present. Mental Status: She is alert and oriented to person, place, and time. Radiographs and Laboratory Studies:   EKG:  Normal sinus rhythm    Laboratory Studies:   Lab Results   Component Value Date    WBC 6.7 02/13/2021    HGB 13.0 02/13/2021    HCT 38.7 02/13/2021    MCV 87.5 02/13/2021     02/13/2021     Lab Results   Component Value Date    SEDRATE 8 07/15/2019     Lab Results   Component Value Date    CRP 1.3 07/15/2019       Assessment and Plan:      Diagnosis Orders   1. Preoperative examination  EKG 12 lead    COVID-19    CBC    Basic Metabolic Panel       Patient was instructed to quarantine until the day of surgery after getting the COVID test.  Blood work and EKG was ordered and will be reviewed. I'll see them back 2 weeks postoperatively.     Manuela Powers PA-C  [place] [address] [facility]  [phone]

## 2021-07-13 LAB — SARS-COV-2, PCR: NOT DETECTED

## 2021-07-15 ENCOUNTER — ANESTHESIA EVENT (OUTPATIENT)
Dept: OPERATING ROOM | Age: 56
End: 2021-07-15
Payer: COMMERCIAL

## 2021-07-19 ENCOUNTER — HOSPITAL ENCOUNTER (OUTPATIENT)
Age: 56
Setting detail: OUTPATIENT SURGERY
Discharge: HOME | End: 2021-07-19
Attending: ORTHOPAEDIC SURGERY | Admitting: ORTHOPAEDIC SURGERY
Payer: COMMERCIAL

## 2021-07-19 ENCOUNTER — ANESTHESIA (OUTPATIENT)
Dept: OPERATING ROOM | Age: 56
End: 2021-07-19
Payer: COMMERCIAL

## 2021-07-19 VITALS
HEART RATE: 72 BPM | SYSTOLIC BLOOD PRESSURE: 146 MMHG | TEMPERATURE: 97.2 F | BODY MASS INDEX: 25.4 KG/M2 | WEIGHT: 126 LBS | HEIGHT: 59 IN | DIASTOLIC BLOOD PRESSURE: 72 MMHG | RESPIRATION RATE: 20 BRPM | OXYGEN SATURATION: 100 %

## 2021-07-19 VITALS
OXYGEN SATURATION: 100 % | DIASTOLIC BLOOD PRESSURE: 65 MMHG | SYSTOLIC BLOOD PRESSURE: 145 MMHG | RESPIRATION RATE: 14 BRPM

## 2021-07-19 DIAGNOSIS — M65.311 TRIGGER FINGER OF RIGHT THUMB: Primary | ICD-10-CM

## 2021-07-19 PROCEDURE — 6360000002 HC RX W HCPCS: Performed by: ORTHOPAEDIC SURGERY

## 2021-07-19 PROCEDURE — 3600000013 HC SURGERY LEVEL 3 ADDTL 15MIN: Performed by: ORTHOPAEDIC SURGERY

## 2021-07-19 PROCEDURE — 2500000003 HC RX 250 WO HCPCS: Performed by: ANESTHESIOLOGY

## 2021-07-19 PROCEDURE — 7100000010 HC PHASE II RECOVERY - FIRST 15 MIN: Performed by: ORTHOPAEDIC SURGERY

## 2021-07-19 PROCEDURE — 2580000003 HC RX 258: Performed by: ANESTHESIOLOGY

## 2021-07-19 PROCEDURE — 26055 INCISE FINGER TENDON SHEATH: CPT | Performed by: ORTHOPAEDIC SURGERY

## 2021-07-19 PROCEDURE — 2709999900 HC NON-CHARGEABLE SUPPLY: Performed by: ORTHOPAEDIC SURGERY

## 2021-07-19 PROCEDURE — 3600000003 HC SURGERY LEVEL 3 BASE: Performed by: ORTHOPAEDIC SURGERY

## 2021-07-19 PROCEDURE — 2580000003 HC RX 258: Performed by: ORTHOPAEDIC SURGERY

## 2021-07-19 PROCEDURE — 7100000011 HC PHASE II RECOVERY - ADDTL 15 MIN: Performed by: ORTHOPAEDIC SURGERY

## 2021-07-19 PROCEDURE — 3700000000 HC ANESTHESIA ATTENDED CARE: Performed by: ORTHOPAEDIC SURGERY

## 2021-07-19 PROCEDURE — 6370000000 HC RX 637 (ALT 250 FOR IP): Performed by: ANESTHESIOLOGY

## 2021-07-19 PROCEDURE — 3700000001 HC ADD 15 MINUTES (ANESTHESIA): Performed by: ORTHOPAEDIC SURGERY

## 2021-07-19 RX ORDER — OXYCODONE HYDROCHLORIDE AND ACETAMINOPHEN 5; 325 MG/1; MG/1
1 TABLET ORAL EVERY 6 HOURS PRN
Qty: 14 TABLET | Refills: 0 | Status: SHIPPED | OUTPATIENT
Start: 2021-07-19 | End: 2021-07-26

## 2021-07-19 RX ORDER — FENTANYL CITRATE 50 UG/ML
25 INJECTION, SOLUTION INTRAMUSCULAR; INTRAVENOUS EVERY 5 MIN PRN
Status: DISCONTINUED | OUTPATIENT
Start: 2021-07-19 | End: 2021-07-19 | Stop reason: HOSPADM

## 2021-07-19 RX ORDER — LABETALOL HYDROCHLORIDE 5 MG/ML
5 INJECTION, SOLUTION INTRAVENOUS EVERY 10 MIN PRN
Status: DISCONTINUED | OUTPATIENT
Start: 2021-07-19 | End: 2021-07-19 | Stop reason: HOSPADM

## 2021-07-19 RX ORDER — LIDOCAINE HYDROCHLORIDE 5 MG/ML
INJECTION, SOLUTION INFILTRATION; INTRAVENOUS PRN
Status: DISCONTINUED | OUTPATIENT
Start: 2021-07-19 | End: 2021-07-19 | Stop reason: SDUPTHER

## 2021-07-19 RX ORDER — PROPOFOL 10 MG/ML
INJECTION, EMULSION INTRAVENOUS CONTINUOUS PRN
Status: DISCONTINUED | OUTPATIENT
Start: 2021-07-19 | End: 2021-07-19 | Stop reason: SDUPTHER

## 2021-07-19 RX ORDER — MAGNESIUM HYDROXIDE 1200 MG/15ML
LIQUID ORAL CONTINUOUS PRN
Status: COMPLETED | OUTPATIENT
Start: 2021-07-19 | End: 2021-07-19

## 2021-07-19 RX ORDER — HYDROCODONE BITARTRATE AND ACETAMINOPHEN 5; 325 MG/1; MG/1
2 TABLET ORAL PRN
Status: COMPLETED | OUTPATIENT
Start: 2021-07-19 | End: 2021-07-19

## 2021-07-19 RX ORDER — DIPHENHYDRAMINE HYDROCHLORIDE 50 MG/ML
12.5 INJECTION INTRAMUSCULAR; INTRAVENOUS
Status: DISCONTINUED | OUTPATIENT
Start: 2021-07-19 | End: 2021-07-19 | Stop reason: HOSPADM

## 2021-07-19 RX ORDER — METOCLOPRAMIDE HYDROCHLORIDE 5 MG/ML
10 INJECTION INTRAMUSCULAR; INTRAVENOUS
Status: DISCONTINUED | OUTPATIENT
Start: 2021-07-19 | End: 2021-07-19 | Stop reason: HOSPADM

## 2021-07-19 RX ORDER — HYDROCODONE BITARTRATE AND ACETAMINOPHEN 5; 325 MG/1; MG/1
1 TABLET ORAL PRN
Status: COMPLETED | OUTPATIENT
Start: 2021-07-19 | End: 2021-07-19

## 2021-07-19 RX ORDER — ONDANSETRON 2 MG/ML
4 INJECTION INTRAMUSCULAR; INTRAVENOUS
Status: DISCONTINUED | OUTPATIENT
Start: 2021-07-19 | End: 2021-07-19 | Stop reason: HOSPADM

## 2021-07-19 RX ORDER — SODIUM CHLORIDE, SODIUM LACTATE, POTASSIUM CHLORIDE, CALCIUM CHLORIDE 600; 310; 30; 20 MG/100ML; MG/100ML; MG/100ML; MG/100ML
INJECTION, SOLUTION INTRAVENOUS CONTINUOUS
Status: DISCONTINUED | OUTPATIENT
Start: 2021-07-19 | End: 2021-07-19 | Stop reason: HOSPADM

## 2021-07-19 RX ORDER — 0.9 % SODIUM CHLORIDE 0.9 %
500 INTRAVENOUS SOLUTION INTRAVENOUS
Status: DISCONTINUED | OUTPATIENT
Start: 2021-07-19 | End: 2021-07-19 | Stop reason: HOSPADM

## 2021-07-19 RX ADMIN — HYDROCODONE BITARTRATE AND ACETAMINOPHEN 2 TABLET: 5; 325 TABLET ORAL at 15:23

## 2021-07-19 RX ADMIN — LIDOCAINE HYDROCHLORIDE 35 ML: 5 INJECTION, SOLUTION INFILTRATION; INTRAVENOUS at 14:40

## 2021-07-19 RX ADMIN — PROPOFOL 120 MCG/KG/MIN: 10 INJECTION, EMULSION INTRAVENOUS at 14:34

## 2021-07-19 RX ADMIN — CEFAZOLIN SODIUM 2000 MG: 1 INJECTION, SOLUTION INTRAVENOUS at 14:41

## 2021-07-19 RX ADMIN — SODIUM CHLORIDE, POTASSIUM CHLORIDE, SODIUM LACTATE AND CALCIUM CHLORIDE 1000 ML: 600; 310; 30; 20 INJECTION, SOLUTION INTRAVENOUS at 12:42

## 2021-07-19 ASSESSMENT — PULMONARY FUNCTION TESTS
PIF_VALUE: 0
PIF_VALUE: 1
PIF_VALUE: 0
PIF_VALUE: 1
PIF_VALUE: 0
PIF_VALUE: 1
PIF_VALUE: 0
PIF_VALUE: 1
PIF_VALUE: 0
PIF_VALUE: 0

## 2021-07-19 ASSESSMENT — PAIN SCALES - GENERAL
PAINLEVEL_OUTOF10: 9
PAINLEVEL_OUTOF10: 8
PAINLEVEL_OUTOF10: 6
PAINLEVEL_OUTOF10: 7

## 2021-07-19 NOTE — ANESTHESIA PRE PROCEDURE
Department of Anesthesiology  Preprocedure Note       Name:  Janki Turner   Age:  49 y.o.  :  1965                                          MRN:  [id number]         Date:  2021      Surgeon: Camelia Sutton):  Mitali Moses MD    Procedure: Procedure(s):  A1 PULLEY RELEASE OF THE RIGHT THUMB. HAND TRAY. (PAT WITH SOUMYA )    Medications prior to admission:   Prior to Admission medications    Medication Sig Start Date End Date Taking? Authorizing Provider   DULoxetine (CYMBALTA) 60 MG extended release capsule TAKE 1 CAPSULE BY MOUTH ONE TIME A DAY 21   VALENTIN Cantor CNP   omeprazole (PRILOSEC) 20 MG delayed release capsule TAKE 1 CAPSULE BY MOUTH ONE TIME A DAY 21   VALENTIN Cantor CNP   oxybutynin (DITROPAN XL) 15 MG extended release tablet Take 1 tablet by mouth daily 21   Gladys Diane MD   levothyroxine (SYNTHROID) 25 MCG tablet Take 1 tablet by mouth daily 5/3/21   VALENTIN Cantor CNP   montelukast (SINGULAIR) 10 MG tablet Take 1 tablet by mouth daily 21   Swathi Castro MD   meloxicam (MOBIC) 15 MG tablet TAKE 1 TABLET BY MOUTH EVERY DAY AFTER A MEAL UNTIL RELIEF 20   VALENTIN Cantor CNP   fluticasone (ARNUITY ELLIPTA) 200 MCG/ACT AEPB Inhale 1 Inhaler into the lungs daily 6/10/20   Swathi Castro MD   ipratropium-albuterol (DUONEB) 0.5-2.5 (3) MG/3ML SOLN nebulizer solution Inhale 3 mLs into the lungs every 4 hours as needed for Shortness of Breath 20   Swathi Castro MD       Current medications:    No current facility-administered medications for this visit. No current outpatient medications on file.      Facility-Administered Medications Ordered in Other Visits   Medication Dose Route Frequency Provider Last Rate Last Admin    ceFAZolin (ANCEF) 2000 mg in dextrose 5 % 100 mL IVPB  2,000 mg Intravenous On Call to [address] [place] [address] MD Hector        lactated ringers infusion   Intravenous Continuous Case Mendoza  mL/hr at 21 1242 1,000 mL at 21 1242       Allergies: Allergies   Allergen Reactions    Iv Contrast [Iodides] Hives     After injection of isovue 370 75 ml patient developed hives. Two noted total on forehead and right temporal area.  Pcn [Penicillins] Other (See Comments)     Syncope when given shot    Menthol-Methyl Salicylate Rash       Problem List:    Patient Active Problem List   Diagnosis Code    Elbow pain M25.529    Backhand tennis elbow M77.10    SOB (shortness of breath) R06.02    Chronic fatigue R53.82    Bronchitis J40    Trigger finger of left thumb M65.312    Multinodular thyroid E04.2       Past Medical History:        Diagnosis Date    Arthritis     Asthma     Chronic fatigue 2017    Dizziness 2017    Hypertension     past trx x 1 yr -- off meds > 4 yrs    Hypothyroidism     meds > 3 yrs -- intermittently trx    Lightheadedness 2017    SOB (shortness of breath) 2017    Weakness 2017       Past Surgical History:        Procedure Laterality Date    FINGER TRIGGER RELEASE Left 2020    LEFT TRIGGER THUMB RELEASE. HAND TRAY performed by Suzanna Landon MD at [address] [place] [address]  2007    preseve ovaries    PARTIAL HYSTERECTOMY         Social History:    Social History     Tobacco Use    Smoking status: Former Smoker     Packs/day: 0.10     Years: 13.00     Pack years: 1.30     Types: Cigarettes     Quit date: 10/14/2013     Years since quittin.7    Smokeless tobacco: Never Used   Substance Use Topics    Alcohol use: No                                Counseling given: Not Answered      Vital Signs (Current): There were no vitals filed for this visit.                                            BP Readings from Last 3 Encounters:   21 123/80   21 120/77   21 130/70       NPO Status:                                                                                 BMI:   Wt Readings from Last 3 Encounters: 07/19/21 126 lb (57.2 kg)   07/12/21 127 lb (57.6 kg)   06/28/21 130 lb (59 kg)     There is no height or weight on file to calculate BMI.    CBC:   Lab Results   Component Value Date    WBC 7.8 07/12/2021    RBC 4.48 07/12/2021    HGB 13.1 07/12/2021    HCT 39.7 07/12/2021    MCV 88.8 07/12/2021    RDW 13.3 07/12/2021     07/12/2021       CMP:   Lab Results   Component Value Date     07/12/2021    K 4.7 07/12/2021     07/12/2021    CO2 30 07/12/2021    BUN 13 07/12/2021    CREATININE 0.71 07/12/2021    GFRAA >60.0 07/12/2021    LABGLOM >60.0 07/12/2021    GLUCOSE 95 07/12/2021    PROT 6.9 02/13/2021    CALCIUM 9.6 07/12/2021    BILITOT <0.2 02/13/2021    ALKPHOS 130 02/13/2021    AST 26 02/13/2021    ALT 29 02/13/2021       POC Tests: No results for input(s): POCGLU, POCNA, POCK, POCCL, POCBUN, POCHEMO, POCHCT in the last 72 hours.     Coags:   Lab Results   Component Value Date    PROTIME 10.2 06/26/2014    INR 1.0 06/26/2014    APTT 27.2 06/26/2014       HCG (If Applicable):   Lab Results   Component Value Date    PREGTESTUR Negative 07/12/2021        ABGs: No results found for: PHART, PO2ART, LFR4TOM, RWB3GYE, BEART, G5GKRWQO     Type & Screen (If Applicable):  No results found for: LABABO, LABRH    Drug/Infectious Status (If Applicable):  No results found for: HIV, HEPCAB    COVID-19 Screening (If Applicable):   Lab Results   Component Value Date    COVID19 Not Detected 07/12/2021         Anesthesia Evaluation  Patient summary reviewed and Nursing notes reviewed no history of anesthetic complications:   Airway: Mallampati: II  TM distance: >3 FB   Neck ROM: full  Mouth opening: > = 3 FB Dental: normal exam         Pulmonary:Negative Pulmonary ROS and normal exam    (+) asthma:                            Cardiovascular:Negative CV ROS  Exercise tolerance: good (>4 METS),   (+) hypertension:,       ECG reviewed      Echocardiogram reviewed  Stress test reviewed       Beta Blocker:  Not on Beta Blocker         Neuro/Psych:   Negative Neuro/Psych ROS              GI/Hepatic/Renal: Neg GI/Hepatic/Renal ROS            Endo/Other: Negative Endo/Other ROS   (+) hypothyroidism::., .          Pt had PAT visit. Abdominal:             Vascular: negative vascular ROS. Other Findings:               Anesthesia Plan      Marni block and MAC     ASA 2       Induction: intravenous. MIPS: Prophylactic antiemetics administered. Anesthetic plan and risks discussed with patient.       Plan discussed with surgical team.                  Mando Sweeney MD   7/19/2021

## 2021-07-19 NOTE — PROGRESS NOTES
Medicated for complaint of pain in \"thumb and palm\" of right hand. Right hand/wrist dressing clean and dry, fingers warm with brisk capillary refill noted. Ice and elevation maintained.

## 2021-07-19 NOTE — PROGRESS NOTES
Pt verbalizes pain \"is better\". Assisted up to bathroom, gait steady. Right hand dressing clean and dry, fingers warm with brisk capillary refill noted.

## 2021-07-19 NOTE — ANESTHESIA PROCEDURE NOTES
Peripheral Block    Patient location during procedure: OR  Start time: 7/19/2021 2:32 PM  End time: 7/19/2021 2:42 PM  Staffing  Performed: anesthesiologist   Anesthesiologist: Ingrid Oppenheim, MD  Preanesthetic Checklist  Completed: patient identified, IV checked, site marked, risks and benefits discussed, surgical consent, monitors and equipment checked, pre-op evaluation, timeout performed, anesthesia consent given, oxygen available and patient being monitored  Peripheral Block  Patient position: supine  Prep: ChloraPrep  Patient monitoring: cardiac monitor, continuous pulse ox, frequent blood pressure checks and IV access  Block type: Marni block  Laterality: right  Injection technique: single-shot  Guidance: other  Local infiltration: lidocaine  Infiltration strength: 0.5 %  Dose: 35 mL  Provider prep: mask and sterile gloves (Sterile probe cover)  Local infiltration: lidocaine  Assessment  Injection assessment: negative aspiration for heme and no paresthesia on injection  Slow fractionated injection: yes  Hemodynamics: stable  Additional Notes      Reason for block: primary anesthetic

## 2021-07-19 NOTE — ANESTHESIA POSTPROCEDURE EVALUATION
Department of Anesthesiology  Postprocedure Note    Patient: Maru Morris  MRN: [id number]  YOB: 1965  Date of evaluation: 7/19/2021  Time:  3:09 PM     Procedure Summary     Date: 07/19/21 Room / Location: [address]    Anesthesia Start: 1430 Anesthesia Stop: 0031    Procedure: A1 PULLEY RELEASE OF THE RIGHT THUMB (Right ) Diagnosis: (RIGHT TRIGGER THUMB)    Surgeons: Mark Medina MD Responsible Provider: Jamir Chung MD    Anesthesia Type: MAC, Marni block ASA Status: 2          Anesthesia Type: MAC, [place] block    Hitesh Phase I: Hitesh Score: 10    Hitesh Phase II:      Last vitals: Reviewed and per EMR flowsheets.        Anesthesia Post Evaluation    Patient location during evaluation: bedside  Patient participation: complete - patient participated  Level of consciousness: awake and awake and alert  Airway patency: patent  Nausea & Vomiting: no nausea and no vomiting  Complications: no  Cardiovascular status: blood pressure returned to baseline and hemodynamically stable  Respiratory status: acceptable  Hydration status: euvolemic

## 2021-07-19 NOTE — H&P
History and physical reviewed from 7/12/2021 is correct without interval change. Plan to proceed with A1 pulley release right thumb.

## 2021-07-20 ENCOUNTER — OFFICE VISIT (OUTPATIENT)
Dept: FAMILY MEDICINE CLINIC | Age: 56
End: 2021-07-20
Payer: COMMERCIAL

## 2021-07-20 VITALS
DIASTOLIC BLOOD PRESSURE: 80 MMHG | SYSTOLIC BLOOD PRESSURE: 116 MMHG | OXYGEN SATURATION: 97 % | HEIGHT: 59 IN | HEART RATE: 75 BPM | WEIGHT: 125.6 LBS | BODY MASS INDEX: 25.32 KG/M2 | TEMPERATURE: 97.2 F

## 2021-07-20 DIAGNOSIS — F41.9 ANXIETY: ICD-10-CM

## 2021-07-20 DIAGNOSIS — G47.00 INSOMNIA, UNSPECIFIED TYPE: Primary | ICD-10-CM

## 2021-07-20 PROCEDURE — 99213 OFFICE O/P EST LOW 20 MIN: CPT | Performed by: NURSE PRACTITIONER

## 2021-07-20 RX ORDER — ALPRAZOLAM 1 MG/1
1 TABLET ORAL NIGHTLY PRN
COMMUNITY
End: 2021-07-20 | Stop reason: SDUPTHER

## 2021-07-20 RX ORDER — ALPRAZOLAM 1 MG/1
1 TABLET ORAL NIGHTLY PRN
Qty: 90 TABLET | Refills: 0 | Status: SHIPPED | OUTPATIENT
Start: 2021-07-20 | End: 2021-10-15

## 2021-07-20 ASSESSMENT — ENCOUNTER SYMPTOMS: SHORTNESS OF BREATH: 0

## 2021-07-20 NOTE — PROGRESS NOTES
Determinants of Health     Financial Resource Strain: Low Risk     Difficulty of Paying Living Expenses: Not very hard   Food Insecurity: No Food Insecurity    Worried About Running Out of Food in the Last Year: Never true    Ran Out of Food in the Last Year: Never true   Transportation Needs: No Transportation Needs    Lack of Transportation (Medical): No    Lack of Transportation (Non-Medical): No   Physical Activity:     Days of Exercise per Week:     Minutes of Exercise per Session:    Stress:     Feeling of Stress :    Social Connections:     Frequency of Communication with Friends and Family:     Frequency of Social Gatherings with Friends and Family:     Attends Mandaen Services:     Active Member of Clubs or Organizations:     Attends Club or Organization Meetings:     Marital Status:    Intimate Partner Violence:     Fear of Current or Ex-Partner:     Emotionally Abused:     Physically Abused:     Sexually Abused:      Current Outpatient Medications on File Prior to Visit   Medication Sig Dispense Refill    oxyCODONE-acetaminophen (PERCOCET) 5-325 MG per tablet Take 1 tablet by mouth every 6 hours as needed for Pain for up to 7 days. Intended supply: 7 days.  Take lowest dose possible to manage pain 14 tablet 0    DULoxetine (CYMBALTA) 60 MG extended release capsule TAKE 1 CAPSULE BY MOUTH ONE TIME A DAY 90 capsule 1    omeprazole (PRILOSEC) 20 MG delayed release capsule TAKE 1 CAPSULE BY MOUTH ONE TIME A DAY 30 capsule 2    oxybutynin (DITROPAN XL) 15 MG extended release tablet Take 1 tablet by mouth daily 30 tablet 3    levothyroxine (SYNTHROID) 25 MCG tablet Take 1 tablet by mouth daily 30 tablet 11    montelukast (SINGULAIR) 10 MG tablet Take 1 tablet by mouth daily 30 tablet 3    meloxicam (MOBIC) 15 MG tablet TAKE 1 TABLET BY MOUTH EVERY DAY AFTER A MEAL UNTIL RELIEF 90 tablet 0    fluticasone (ARNUITY ELLIPTA) 200 MCG/ACT AEPB Inhale 1 Inhaler into the lungs daily 30 each 3  ipratropium-albuterol (DUONEB) 0.5-2.5 (3) MG/3ML SOLN nebulizer solution Inhale 3 mLs into the lungs every 4 hours as needed for Shortness of Breath 360 mL 2     No current facility-administered medications on file prior to visit. Allergies: Iv contrast [iodides], Pcn [penicillins], and Menthol-methyl salicylate    Review of Systems   Constitutional: Negative for chills, fatigue and fever. HENT: Negative for congestion. Respiratory: Negative for shortness of breath. Cardiovascular: Negative for chest pain and palpitations. Psychiatric/Behavioral: Positive for sleep disturbance. Negative for self-injury and suicidal ideas. The patient is nervous/anxious. Objective:   /80 (Site: Left Upper Arm, Position: Sitting, Cuff Size: Medium Adult)   Pulse 75   Temp 97.2 °F (36.2 °C) (Temporal)   Ht 4' 11\" (1.499 m)   Wt 125 lb 9.6 oz (57 kg)   SpO2 97%   Breastfeeding No   BMI 25.37 kg/m²     Physical Exam  Constitutional:       Appearance: She is well-developed. HENT:      Head: Normocephalic. Right Ear: External ear normal.      Left Ear: External ear normal.      Nose: Nose normal.      Mouth/Throat:      Mouth: Mucous membranes are moist.      Pharynx: Oropharynx is clear. Eyes:      Conjunctiva/sclera: Conjunctivae normal.   Cardiovascular:      Rate and Rhythm: Normal rate and regular rhythm. Heart sounds: Normal heart sounds. Pulmonary:      Effort: Pulmonary effort is normal.      Breath sounds: Normal breath sounds. Musculoskeletal:         General: Normal range of motion. Cervical back: Normal range of motion. Skin:     General: Skin is warm and dry. Neurological:      Mental Status: She is alert and oriented to person, place, and time. Psychiatric:         Mood and Affect: Mood is anxious. Speech: Speech normal.         Behavior: Behavior normal. Behavior is cooperative. Assessment:          Diagnosis Orders   1.  Insomnia, unspecified type  ALPRAZolam (XANAX) 1 MG tablet   2. Anxiety  ALPRAZolam (XANAX) 1 MG tablet       Plan:      No orders of the defined types were placed in this encounter. Orders Placed This Encounter   Medications    ALPRAZolam (XANAX) 1 MG tablet     Sig: Take 1 tablet by mouth nightly as needed for Sleep or Anxiety for up to 90 days. Dispense:  90 tablet     Refill:  0       Return in about 3 months (around 10/18/2021). Conditions chronic and stable. Continue current treatment plan. Reviewed with the patient: current clinicalstatus, medications, activities and diet. Side effects, adverse effects of the medication prescribedtoday, as well as treatment plan/ rationale and result expectations have been discussedwith the patient who expresses understanding and desires to proceed. Close follow upto evaluate treatment results and for coordination of care. I have reviewedthe patient's medical history in detail and updated the computerized patient record.     Melanie Bahena, VALENTIN - CNP

## 2021-07-20 NOTE — PATIENT INSTRUCTIONS
Patient Education        Learning About Sleeping Well  What does sleeping well mean? Sleeping well means getting enough sleep. How much sleep is enough varies among people. The number of hours you sleep is not as important as how you feel when you wake up. If you do not feel refreshed, you probably need more sleep. Another sign of not getting enough sleep is feeling tired during the day. The average total nightly sleep time is 7½ to 8 hours. Healthy adults may need a little more or a little less than this. Why is getting enough sleep important? Getting enough quality sleep is a basic part of good health. When your sleep suffers, your mood and your thoughts can suffer too. You may find yourself feeling more grumpy or stressed. Not getting enough sleep also can lead to serious problems, including injury, accidents, anxiety, and depression. What might cause poor sleeping? Many things can cause sleep problems, including:  · Stress. Stress can be caused by fear about a single event, such as giving a speech. Or you may have ongoing stress, such as worry about work or school. · Depression, anxiety, and other mental or emotional conditions. · Changes in your sleep habits or surroundings. This includes changes that happen where you sleep, such as noise, light, or sleeping in a different bed. It also includes changes in your sleep pattern, such as having jet lag or working a late shift. · Health problems, such as pain, breathing problems, and restless legs syndrome. · Lack of regular exercise. How can you help yourself? Here are some tips that may help you sleep more soundly and wake up feeling more refreshed. Your sleeping area   · Use your bedroom only for sleeping and sex. A bit of light reading may help you fall asleep. But if it doesn't, do your reading elsewhere in the house. Don't watch TV in bed.   · Be sure your bed is big enough to stretch out comfortably, especially if you have a sleep 2020               Content Version: 12.9  © 4367-2190 [employer]. Care instructions adapted under license by [facility] ([facility]). If you have questions about a medical condition or this instruction, always ask your healthcare professional. [address] any warranty or liability for your use of this information.

## 2021-08-02 ENCOUNTER — OFFICE VISIT (OUTPATIENT)
Dept: ORTHOPEDIC SURGERY | Age: 56
End: 2021-08-02

## 2021-08-02 VITALS
HEART RATE: 81 BPM | OXYGEN SATURATION: 97 % | BODY MASS INDEX: 25.2 KG/M2 | WEIGHT: 125 LBS | TEMPERATURE: 97.4 F | HEIGHT: 59 IN

## 2021-08-02 DIAGNOSIS — M65.311 TRIGGER THUMB, RIGHT THUMB: Primary | ICD-10-CM

## 2021-08-02 PROCEDURE — 99024 POSTOP FOLLOW-UP VISIT: CPT | Performed by: ORTHOPAEDIC SURGERY

## 2021-08-03 NOTE — PROGRESS NOTES
Subjective:      Patient ID: Lisa Funk is a 49 y.o. female who presents today for:  Chief Complaint   Patient presents with    Post-Op Check     for Trigger thumb of right hand       HPI  Patient doing well overall. Reports very little to no pain. Denies any fevers or chills. Did change the dressing once. Past Medical History:   Diagnosis Date    Arthritis     Asthma     Chronic fatigue 2017    Dizziness 2017    Hypertension     past trx x 1 yr -- off meds > 4 yrs    Hypothyroidism     meds > 3 yrs -- intermittently trx    Lightheadedness 2017    SOB (shortness of breath) 2017    Weakness 2017      Past Surgical History:   Procedure Laterality Date    FINGER TRIGGER RELEASE Left 2020    LEFT TRIGGER THUMB RELEASE.  HAND TRAY performed by Kezia Quigley MD at [address] [facility] Right 2021    A1 PULLEY RELEASE OF THE RIGHT THUMB performed by Kezia Quigley MD at [address]      preseve ovaries    PARTIAL HYSTERECTOMY       Social History     Socioeconomic History    Marital status: Single     Spouse name: Not on file    Number of children: Not on file    Years of education: Not on file    Highest education level: Not on file   Occupational History    Not on file   Tobacco Use    Smoking status: Former Smoker     Packs/day: 0.10     Years: 13.00     Pack years: 1.30     Types: Cigarettes     Quit date: 10/14/2013     Years since quittin.8    Smokeless tobacco: Never Used   Vaping Use    Vaping Use: Never used   Substance and Sexual Activity    Alcohol use: No    Drug use: No    Sexual activity: Yes   Other Topics Concern    Not on file   Social History Narrative    Not on file     Social Determinants of Health     Financial Resource Strain: Low Risk     Difficulty of Paying Living Expenses: Not very hard   Food Insecurity: No Food Insecurity    Worried About Running Out of Food in the Last Year: Never true    Gerard of Food in the Last Year: Never true   Transportation Needs: No Transportation Needs    Lack of Transportation (Medical): No    Lack of Transportation (Non-Medical): No   Physical Activity:     Days of Exercise per Week:     Minutes of Exercise per Session:    Stress:     Feeling of Stress :    Social Connections:     Frequency of Communication with Friends and Family:     Frequency of Social Gatherings with Friends and Family:     Attends Christianity Services:     Active Member of Clubs or Organizations:     Attends Club or Organization Meetings:     Marital Status:    Intimate Partner Violence:     Fear of Current or Ex-Partner:     Emotionally Abused:     Physically Abused:     Sexually Abused:      Family History   Problem Relation Age of Onset    Hypertension Mother     Thyroid Disease Mother     High Blood Pressure Mother     Diabetes Maternal Grandmother     Breast Cancer Sister     Heart Attack Brother          at age 62     Allergies   Allergen Reactions    Iv Contrast [Iodides] Hives     After injection of isovue 370 75 ml patient developed hives. Two noted total on forehead and right temporal area.  Pcn [Penicillins] Other (See Comments)     Syncope when given shot    Menthol-Methyl Salicylate Rash     Current Outpatient Medications on File Prior to Visit   Medication Sig Dispense Refill    ALPRAZolam (XANAX) 1 MG tablet Take 1 tablet by mouth nightly as needed for Sleep or Anxiety for up to 90 days.  90 tablet 0    DULoxetine (CYMBALTA) 60 MG extended release capsule TAKE 1 CAPSULE BY MOUTH ONE TIME A DAY 90 capsule 1    omeprazole (PRILOSEC) 20 MG delayed release capsule TAKE 1 CAPSULE BY MOUTH ONE TIME A DAY 30 capsule 2    oxybutynin (DITROPAN XL) 15 MG extended release tablet Take 1 tablet by mouth daily 30 tablet 3    levothyroxine (SYNTHROID) 25 MCG tablet Take 1 tablet by mouth daily 30 tablet 11    montelukast (SINGULAIR) 10 MG tablet Take 1 tablet by mouth daily 30 tablet 3    meloxicam (MOBIC) 15 MG tablet TAKE 1 TABLET BY MOUTH EVERY DAY AFTER A MEAL UNTIL RELIEF 90 tablet 0    fluticasone (ARNUITY ELLIPTA) 200 MCG/ACT AEPB Inhale 1 Inhaler into the lungs daily 30 each 3    ipratropium-albuterol (DUONEB) 0.5-2.5 (3) MG/3ML SOLN nebulizer solution Inhale 3 mLs into the lungs every 4 hours as needed for Shortness of Breath 360 mL 2     No current facility-administered medications on file prior to visit. Review of Systems  General: Denies fever, chills  Cardiovascular: Denies chest pain  Pulmonary: Denies shortness of breath  GI: Denies nausea or vomiting  Neuro: Denies numbness or tingling    Objective:   Pulse 81   Temp 97.4 °F (36.3 °C) (Temporal)   Ht 4' 11\" (1.499 m)   Wt 125 lb (56.7 kg)   SpO2 97%   BMI 25.25 kg/m²     Ortho Exam  General: Well-appearing female who appears their stated age  Right upper extremity:  Skin: Intact circumferentially with well-healing transverse incision about the base of the right thumb, sutures are ready for removal and Steri-Strips were applied, minimal edema about the base of the thumb. Neuro: Sensation intact light touch in the radial, ulnar and median nerve distribution. Able to perform all cardinal hand movements. Vascular: Strong palpable radial pulse, brisk cap refill in all digits. MSK: No clicking or catching is appreciated on flexion or extension of the thumb. Radiographs and Laboratory Studies:     Diagnostic Imaging Studies:    None      Assessment:      2-week status post right trigger thumb release     Plan:     Patient doing very well overall. Okay for gentle range of motion of the thumb at this time. Would like see patient back in 4 weeks for repeat clinical evaluation. Patient should limit weightbearing on the operative hand until mature scar is present at the base of the thumb.     German Lance MD

## 2021-08-12 ENCOUNTER — TELEPHONE (OUTPATIENT)
Dept: ENDOCRINOLOGY | Age: 56
End: 2021-08-12

## 2021-08-12 NOTE — TELEPHONE ENCOUNTER
Patient called in and she is requesting an extension on her FMLA until Aug. 23rd and she is requesting this be faxed over to her employer.  Absence Service fax #[phone] please advise    Please Route to [facility] clinical pool

## 2021-08-18 ENCOUNTER — TELEPHONE (OUTPATIENT)
Dept: ORTHOPEDIC SURGERY | Age: 56
End: 2021-08-18

## 2021-08-18 NOTE — TELEPHONE ENCOUNTER
Letty GALARZA Patient called stated Misael Haddad did not get her workability evaluation paper work I re faxed the paperwork. It was successful I am scanning into the patients chart.

## 2021-08-31 ENCOUNTER — OFFICE VISIT (OUTPATIENT)
Dept: FAMILY MEDICINE CLINIC | Age: 56
End: 2021-08-31
Payer: COMMERCIAL

## 2021-08-31 VITALS
DIASTOLIC BLOOD PRESSURE: 78 MMHG | RESPIRATION RATE: 15 BRPM | HEIGHT: 59 IN | BODY MASS INDEX: 25.2 KG/M2 | WEIGHT: 125 LBS | HEART RATE: 88 BPM | SYSTOLIC BLOOD PRESSURE: 126 MMHG

## 2021-08-31 DIAGNOSIS — Z00.00 ANNUAL PHYSICAL EXAM: Primary | ICD-10-CM

## 2021-08-31 DIAGNOSIS — F41.9 ANXIETY: ICD-10-CM

## 2021-08-31 DIAGNOSIS — Z78.9 PARTICIPANT IN HEALTH AND WELLNESS PLAN: ICD-10-CM

## 2021-08-31 DIAGNOSIS — G47.00 INSOMNIA, UNSPECIFIED TYPE: ICD-10-CM

## 2021-08-31 PROCEDURE — 99396 PREV VISIT EST AGE 40-64: CPT | Performed by: NURSE PRACTITIONER

## 2021-08-31 RX ORDER — MELOXICAM 15 MG/1
TABLET ORAL
Qty: 90 TABLET | Refills: 3 | Status: SHIPPED | OUTPATIENT
Start: 2021-08-31

## 2021-08-31 RX ORDER — ALPRAZOLAM 1 MG/1
1 TABLET, EXTENDED RELEASE ORAL NIGHTLY
Qty: 30 TABLET | Refills: 2 | Status: SHIPPED | OUTPATIENT
Start: 2021-08-31 | End: 2021-09-30

## 2021-08-31 ASSESSMENT — ENCOUNTER SYMPTOMS
CONSTIPATION: 0
RESPIRATORY NEGATIVE: 1
ALLERGIC/IMMUNOLOGIC NEGATIVE: 1
ANAL BLEEDING: 0
GASTROINTESTINAL NEGATIVE: 1
TROUBLE SWALLOWING: 0
BLOOD IN STOOL: 0
DIARRHEA: 0
EYES NEGATIVE: 1
RECTAL PAIN: 0
COLOR CHANGE: 0
VOICE CHANGE: 0
ABDOMINAL PAIN: 0
SHORTNESS OF BREATH: 0

## 2021-08-31 NOTE — PROGRESS NOTES
Leigh Wharton (:  1965) is a 49 y.o. female,Established patient, here for evaluation of the following chief complaint(s): Anxiety (f/u xanax )         ASSESSMENT/PLAN:  1. Annual physical exam  2. Participant in health and wellness plan  3. Insomnia, unspecified type  -     ALPRAZolam (XANAX XR) 1 MG extended release tablet; Take 1 tablet by mouth nightly for 30 days. , Disp-30 tablet, R-2Normal  4. Anxiety  -     ALPRAZolam (XANAX XR) 1 MG extended release tablet; Take 1 tablet by mouth nightly for 30 days. , Disp-30 tablet, R-2Normal      Return in about 3 months (around 2021). Subjective   SUBJECTIVE/OBJECTIVE:  HPI  Her for annual exam-  No complaints-  BE WELL physical for work    Takes xanax for sleep  Controlled Substance Monitoring:    Acute and Chronic Pain Monitoring:   RX Monitoring 2021   Attestation -   Acute Pain Prescriptions -   Periodic Controlled Substance Monitoring Possible medication side effects, risk of tolerance/dependence & alternative treatments discussed. ;No signs of potential drug abuse or diversion identified. ;Assessed functional status. Chronic Pain > 50 MEDD -         Review of Systems   Constitutional: Negative. Negative for activity change, appetite change, fatigue and unexpected weight change. HENT: Negative. Negative for dental problem, nosebleeds, trouble swallowing and voice change. Eyes: Negative. Negative for visual disturbance. Respiratory: Negative. Negative for shortness of breath. Cardiovascular: Negative. Negative for chest pain, palpitations and leg swelling. Gastrointestinal: Negative. Negative for abdominal pain, anal bleeding, blood in stool, constipation, diarrhea and rectal pain. Endocrine: Negative. Negative for cold intolerance, heat intolerance, polydipsia, polyphagia and polyuria. Genitourinary: Negative. Musculoskeletal: Negative. Skin: Negative. Negative for color change and rash. Allergic/Immunologic: Negative. Neurological: Negative. Negative for dizziness, syncope, weakness and headaches. Hematological: Negative. Negative for adenopathy. Does not bruise/bleed easily. Psychiatric/Behavioral: Negative. Negative for dysphoric mood and sleep disturbance. The patient is not nervous/anxious. Objective   Physical Exam  Constitutional:       General: She is not in acute distress. Appearance: She is well-developed. HENT:      Head: Normocephalic and atraumatic. Right Ear: External ear normal.      Left Ear: External ear normal.      Nose: Nose normal.   Eyes:      Conjunctiva/sclera: Conjunctivae normal.      Pupils: Pupils are equal, round, and reactive to light. Neck:      Vascular: No JVD. Cardiovascular:      Rate and Rhythm: Normal rate and regular rhythm. Pulses: Normal pulses. Heart sounds: Normal heart sounds. No murmur heard. Pulmonary:      Effort: Pulmonary effort is normal. No respiratory distress. Breath sounds: Normal breath sounds. No wheezing or rales. Abdominal:      General: Bowel sounds are normal. There is no distension. Palpations: Abdomen is soft. There is no mass. Tenderness: There is no abdominal tenderness. Musculoskeletal:      Cervical back: Neck supple. Skin:     General: Skin is warm and dry. Capillary Refill: Capillary refill takes less than 2 seconds. Neurological:      General: No focal deficit present. Mental Status: She is alert and oriented to person, place, and time. Psychiatric:         Mood and Affect: Mood normal.                  An electronic signature was used to authenticate this note.     --Madeline Church, VALENTIN - CNP

## 2021-09-14 ENCOUNTER — PATIENT MESSAGE (OUTPATIENT)
Dept: FAMILY MEDICINE CLINIC | Age: 56
End: 2021-09-14

## 2021-09-15 NOTE — TELEPHONE ENCOUNTER
From: Felicia Ozuna  To: VALENTIN Godoy - CNP  Sent: 9/14/2021 1:56 PM EDT  Subject: Prescription Question    Sedrick romero was wondering if you can give me a refill of busperone.  Thank you have a great day

## 2021-09-16 RX ORDER — BUSPIRONE HYDROCHLORIDE 7.5 MG/1
7.5 TABLET ORAL 3 TIMES DAILY
Qty: 90 TABLET | Refills: 11 | Status: SHIPPED | OUTPATIENT
Start: 2021-09-16 | End: 2021-10-16

## 2021-10-15 DIAGNOSIS — F41.9 ANXIETY: ICD-10-CM

## 2021-10-15 DIAGNOSIS — G47.00 INSOMNIA, UNSPECIFIED TYPE: ICD-10-CM

## 2021-10-15 RX ORDER — ALPRAZOLAM 1 MG/1
TABLET ORAL
Qty: 90 TABLET | Refills: 0 | Status: SHIPPED | OUTPATIENT
Start: 2021-10-15 | End: 2021-11-30 | Stop reason: SDUPTHER

## 2021-10-19 ENCOUNTER — OFFICE VISIT (OUTPATIENT)
Dept: NEUROLOGY | Age: 56
End: 2021-10-19
Payer: COMMERCIAL

## 2021-10-19 VITALS
SYSTOLIC BLOOD PRESSURE: 134 MMHG | BODY MASS INDEX: 25.69 KG/M2 | OXYGEN SATURATION: 97 % | HEART RATE: 78 BPM | WEIGHT: 127.2 LBS | DIASTOLIC BLOOD PRESSURE: 88 MMHG

## 2021-10-19 DIAGNOSIS — R51.9 CHRONIC NONINTRACTABLE HEADACHE, UNSPECIFIED HEADACHE TYPE: ICD-10-CM

## 2021-10-19 DIAGNOSIS — R20.2 NUMBNESS AND TINGLING: ICD-10-CM

## 2021-10-19 DIAGNOSIS — G47.61 PERIODIC LIMB MOVEMENT DISORDER (PLMD): ICD-10-CM

## 2021-10-19 DIAGNOSIS — G89.29 CHRONIC NONINTRACTABLE HEADACHE, UNSPECIFIED HEADACHE TYPE: ICD-10-CM

## 2021-10-19 DIAGNOSIS — M79.10 MUSCLE PAIN: ICD-10-CM

## 2021-10-19 DIAGNOSIS — R53.82 CHRONIC FATIGUE: Primary | ICD-10-CM

## 2021-10-19 DIAGNOSIS — R20.0 NUMBNESS AND TINGLING: ICD-10-CM

## 2021-10-19 PROCEDURE — 99204 OFFICE O/P NEW MOD 45 MIN: CPT | Performed by: STUDENT IN AN ORGANIZED HEALTH CARE EDUCATION/TRAINING PROGRAM

## 2021-10-19 RX ORDER — ROPINIROLE 0.25 MG/1
TABLET, FILM COATED ORAL
Qty: 90 TABLET | Refills: 0 | Status: SHIPPED | OUTPATIENT
Start: 2021-10-19 | End: 2022-05-27 | Stop reason: CLARIF

## 2021-10-19 NOTE — PROGRESS NOTES
[facility] NEUROLOGY  [address]  [phone]     Date of Visit:  10/19/2021  Patient Name: Constance Alberts   Patient :  1965     CHIEF COMPLAINT:     Constance Alberts is a 49 y.o. female who presents today for an general visit to be evaluated for the following condition(s):  No chief complaint on file. HISTORY OF PRESENT ILLNESS     HPI    Mayur Heller is a 72-year-old female with a past medical history of hypothyroidism, anxiety, and asthma comes for evaluation of a constellation of symptoms including severe daytime fatigue, generalized muscle pain, generalized muscle weakness, numbness, headaches, photophobia, and dizziness. Patient reports that the symptoms began several years ago. Patient reports that her sister has similar symptoms and was diagnosed with fibromyalgia. Her cousin has lupus. Patient reports that she is still fatigue it can be difficult to work. She has had a sleep study in the past and was told it was normal.  Primary care is done a thorough work-up including thyroid studies, vitamin D level, and assessment for anemia, all of which were normal.     She reports headaches that occur 2-3 times per week, affects her bifrontal region, and is accompanied by photophobia, nausea, and blurred vision. Headaches last the entire day. She reports she is never been diagnosed with migraines and currently takes over-the-counter medication which has not resolved her headaches. Patient reports generalized weakness and an overall feeling of malaise. She has generalized muscle pain and especially notices weakness in her lower extremities. Has not fallen. Patient reports that about 6 weeks ago, the left side of her face started to get numb and tingly and was accompanied by headache and earache. Patient reports that the pain lasted for a full week and was constant.   Was accompanied by blurred vision but no visual field loss.  Patient did have pain with chewing but it was not as shocking sensation and more of her pain in her gums. Patient denies seizures, syncope, focal limb weakness, visual field deficit, or diplopia. REVIEW OF SYSTEM      Review of Systems   Constitutional: Positive for fatigue. Negative for chills and fever. HENT: Negative for congestion and trouble swallowing. Eyes: Positive for photophobia. Negative for visual disturbance. Respiratory: Negative for chest tightness and shortness of breath. Cardiovascular: Negative for chest pain. Gastrointestinal: Positive for nausea. Negative for diarrhea and vomiting. Endocrine: Negative. Genitourinary: Negative. Musculoskeletal: Positive for myalgias. Negative for gait problem. Skin: Negative. Allergic/Immunologic: Negative. Neurological: Positive for weakness, numbness and headaches. Negative for dizziness, tremors, seizures, syncope, facial asymmetry, speech difficulty and light-headedness. Hematological: Negative. Psychiatric/Behavioral: Negative for hallucinations and self-injury. REVIEWED INFORMATION      Allergies   Allergen Reactions    Iv Contrast [Iodides] Hives     After injection of isovue 370 75 ml patient developed hives. Two noted total on forehead and right temporal area.     Pcn [Penicillins] Other (See Comments)     Syncope when given shot    Menthol-Methyl Salicylate Rash       Patient Active Problem List   Diagnosis    Elbow pain    Backhand tennis elbow    SOB (shortness of breath)    Chronic fatigue    Bronchitis    Trigger finger of left thumb    Multinodular thyroid       Past Medical History:   Diagnosis Date    Arthritis     Asthma     Chronic fatigue 4/25/2017    Dizziness 4/25/2017    Hypertension     past trx x 1 yr -- off meds > 4 yrs    Hypothyroidism     meds > 3 yrs -- intermittently trx    Lightheadedness 4/25/2017    SOB (shortness of breath) 4/24/2017    Weakness 4/25/2017 Past Surgical History:   Procedure Laterality Date    FINGER TRIGGER RELEASE Left 2020    LEFT TRIGGER THUMB RELEASE. HAND TRAY performed by Kings Gill MD at [facility] Right 2021    A1 PULLEY RELEASE OF THE RIGHT THUMB performed by Kings Gill MD at [facility] [facility]  2007    preseve ovaries    PARTIAL HYSTERECTOMY  2009        Social History     Socioeconomic History    Marital status: Single     Spouse name: None    Number of children: None    Years of education: None    Highest education level: None   Occupational History    None   Tobacco Use    Smoking status: Former Smoker     Packs/day: 0.10     Years: 13.00     Pack years: 1.30     Types: Cigarettes     Quit date: 10/14/2013     Years since quittin.0    Smokeless tobacco: Never Used   Vaping Use    Vaping Use: Never used   Substance and Sexual Activity    Alcohol use: No    Drug use: No    Sexual activity: Yes   Other Topics Concern    None   Social History Narrative    None     Social Determinants of Health     Financial Resource Strain: Low Risk     Difficulty of Paying Living Expenses: Not very hard   Food Insecurity: No Food Insecurity    Worried About Running Out of Food in the Last Year: Never true    Gerard of Food in the Last Year: Never true   Transportation Needs: No Transportation Needs    Lack of Transportation (Medical): No    Lack of Transportation (Non-Medical):  No   Physical Activity:     Days of Exercise per Week:     Minutes of Exercise per Session:    Stress:     Feeling of Stress :    Social Connections:     Frequency of Communication with Friends and Family:     Frequency of Social Gatherings with Friends and Family:     Attends Latter-day Services:     Active Member of Clubs or Organizations:     Attends Club or Organization Meetings:     Marital Status:    Intimate Partner Violence:     Fear of Current or Ex-Partner:     Emotionally Abused:  Physically Abused:     Sexually Abused:         PHYSICAL EXAM     Vitals:    10/19/21 1517   BP: 134/88   Site: Left Upper Arm   Position: Sitting   Cuff Size: Medium Adult   Pulse: 78   SpO2: 97%   Weight: 127 lb 3.2 oz (57.7 kg)     Physical Exam  Vitals and nursing note reviewed. Constitutional:       General: She is awake. Appearance: Normal appearance. HENT:      Head: Normocephalic and atraumatic. Eyes:      General: Lids are normal.      Extraocular Movements: Extraocular movements intact. Conjunctiva/sclera: Conjunctivae normal.      Pupils: Pupils are equal, round, and reactive to light. Cardiovascular:      Rate and Rhythm: Normal rate and regular rhythm. Pulses: Normal pulses. Heart sounds: Normal heart sounds. Pulmonary:      Effort: Pulmonary effort is normal.      Breath sounds: Normal breath sounds. Musculoskeletal:         General: Normal range of motion. Skin:     General: Skin is warm and dry. Neurological:      General: No focal deficit present. Mental Status: She is alert and oriented to person, place, and time. Cranial Nerves: No cranial nerve deficit. Sensory: Sensory deficit present. Motor: No weakness. Coordination: Coordination normal.      Gait: Gait normal.      Deep Tendon Reflexes: Reflexes normal.   Psychiatric:         Mood and Affect: Mood normal.         Behavior: Behavior normal. Behavior is cooperative. Thought Content: Thought content normal.     Vibratory sensation intact in bilateral lower extremities  Decreased pinprick sensation to ankles and feet bilaterally. Exam nonfocal.    ASSESSMENT/PLAN   Pleasant 32-year-old female here for evaluation for a constellation of symptoms including severe daytime hypersomnolence, generalized muscle pain, generalized muscle weakness, numbness, headaches, photophobia, and dizziness.     Patient has had a thorough work-up by primary care thus far including has been for anemia, hypothyroidism, and vitamin D deficiency which were all negative. Work-up for lupus and other autoimmune disorders was also negative. Patient had a sleep study which did show adequate REM sleep but did show periodic limb movement disorder. This could be causing hypoarousals and interfere with sleep quality, and therefore we will try Requip to see if this improves PLMD and sleep quality. Did discuss that PLMD is can be challenging to treat and does not always respond to dopamine agonist.  We will give Requip good trial, but will discontinue if ineffective. Patient's constellation of symptoms including severe fatigue and morning dizziness, will obtain MRI of the brain with and without contrast to assess for pathologies like multiple sclerosis or Chiari malformation. Many of the features patient describes including 8 out of 10 headache pain, light sensitivity, pain in her temple, nausea, and fatigue could also be related to migrainous features. Episode of left-sided temple pain that lasted a week is most consistent with migraine and low suspicion for trigeminal neuralgia or autonomic trigeminal cephalgias. Patient is experiencing these headaches 2-3 times a week, and I will provide sample of Nurtec and Ubrelvy to see if patient's headaches respond to CGRP receptor blockers. Sitter preventative therapy at next visit. Teen CK levels and inflammatory markers to assess for true muscle pathology although low suspicion. To follow-up in 4 to 6 weeks after MRI has been completed. COMMUNICATION:   Thank you Tomas Sol CNP for the referral and allowing me to participate in the care of Amy Conklin.     Electronically signed by Saida Stephens PA-C, PA-C on 10/19/2021 at 3:18 PM

## 2021-10-20 DIAGNOSIS — M79.10 MUSCLE PAIN: ICD-10-CM

## 2021-10-20 DIAGNOSIS — R20.0 NUMBNESS AND TINGLING: ICD-10-CM

## 2021-10-20 DIAGNOSIS — R20.2 NUMBNESS AND TINGLING: ICD-10-CM

## 2021-10-20 DIAGNOSIS — R53.82 CHRONIC FATIGUE: ICD-10-CM

## 2021-10-20 LAB
C-REACTIVE PROTEIN: 1.4 MG/L (ref 0–5)
SEDIMENTATION RATE, ERYTHROCYTE: 8 MM (ref 0–30)
TOTAL CK: 73 U/L (ref 0–170)

## 2021-10-20 ASSESSMENT — ENCOUNTER SYMPTOMS
DIARRHEA: 0
TROUBLE SWALLOWING: 0
NAUSEA: 1
VOMITING: 0
ALLERGIC/IMMUNOLOGIC NEGATIVE: 1
SHORTNESS OF BREATH: 0
PHOTOPHOBIA: 1
CHEST TIGHTNESS: 0

## 2021-10-26 ENCOUNTER — NURSE TRIAGE (OUTPATIENT)
Dept: OTHER | Facility: CLINIC | Age: 56
End: 2021-10-26

## 2021-10-26 NOTE — TELEPHONE ENCOUNTER
Received call from Lucie at [facility] with [employer]. Brief description of triage: Urinary symptoms and back pain. Triage indicates for patient to see today in office. If no appointment, go to [facility] or [facility]. Patient at work and unable to go. She would like to be scheduled for tomorrow. Called PCP, but received a busy line x2. Care advice provided, patient verbalizes understanding; denies any other questions or concerns; instructed to call back for any new or worsening symptoms. Writer provided warm transfer to April at [facility] for appointment scheduling. Attention Provider: Thank you for allowing me to participate in the care of your patient. The patient was connected to triage in response to information provided to the [facility]/PSC. Please do not respond through this encounter as the response is not directed to a shared pool. Reason for Disposition   Side (flank) or lower back pain present    Answer Assessment - Initial Assessment Questions  1. SYMPTOM: \"What's the main symptom you're concerned about? \" (e.g., frequency, incontinence)      Trouble emptying bladder and back pain     2. ONSET: \"When did the symptoms  start? \"      2 days     3. PAIN: \"Is there any pain? \" If so, ask: \"How bad is it? \" (Scale: 1-10; mild, moderate, severe)      9/10 pain     4. CAUSE: \"What do you think is causing the symptoms? \"      Hx of UTI (takes Oxybutynin)     5. OTHER SYMPTOMS: \"Do you have any other symptoms? \" (e.g., fever, flank pain, blood in urine, pain with urination)      Flank pain, painful urination     6. PREGNANCY: \"Is there any chance you are pregnant? \" \"When was your last menstrual period? \"      Denies    Protocols used: URINARY SYMPTOMS-ADULT-[place]

## 2021-10-26 NOTE — TELEPHONE ENCOUNTER
Received call from [address] at [facility] with [employer]. Informed call was disconnected waiting for nurse triage. Noted pt was already triaged. ECC stated there are no appointments today at PCP's office. Will call pt back and inform to go to [facility] as instructed per prior nurse triage disposition if unable to get PCP appt. Called patient, no answer. Received unidentified voicemail. Did not leave information regarding disposition. Informed to call back if needed further care. Attention Provider: Thank you for allowing me to participate in the care of your patient. The patient was connected to triage in response to information provided to the [facility]/PSC. Please do not respond through this encounter as the response is not directed to a shared pool. Reason for Disposition   Message left on unidentified voice mail. Phone number verified.     Protocols used: NO CONTACT OR DUPLICATE CONTACT CALL-ADULT-[place]

## 2021-10-27 ENCOUNTER — TELEPHONE (OUTPATIENT)
Dept: FAMILY MEDICINE CLINIC | Age: 56
End: 2021-10-27

## 2021-10-27 ENCOUNTER — OFFICE VISIT (OUTPATIENT)
Dept: FAMILY MEDICINE CLINIC | Age: 56
End: 2021-10-27
Payer: COMMERCIAL

## 2021-10-27 VITALS
SYSTOLIC BLOOD PRESSURE: 128 MMHG | DIASTOLIC BLOOD PRESSURE: 82 MMHG | OXYGEN SATURATION: 98 % | BODY MASS INDEX: 25.4 KG/M2 | WEIGHT: 126 LBS | HEIGHT: 59 IN | TEMPERATURE: 97.4 F | HEART RATE: 72 BPM

## 2021-10-27 DIAGNOSIS — R39.9 UTI SYMPTOMS: Primary | ICD-10-CM

## 2021-10-27 DIAGNOSIS — R10.9 LEFT FLANK PAIN: ICD-10-CM

## 2021-10-27 DIAGNOSIS — R39.9 UTI SYMPTOMS: ICD-10-CM

## 2021-10-27 DIAGNOSIS — N30.01 ACUTE CYSTITIS WITH HEMATURIA: ICD-10-CM

## 2021-10-27 LAB
BILIRUBIN, POC: NORMAL
BLOOD URINE, POC: NORMAL
CLARITY, POC: CLEAR
COLOR, POC: YELLOW
GLUCOSE URINE, POC: NORMAL
KETONES, POC: NORMAL
LEUKOCYTE EST, POC: NORMAL
NITRITE, POC: NORMAL
PH, POC: 6
PROTEIN, POC: NORMAL
SPECIFIC GRAVITY, POC: 1.03
UROBILINOGEN, POC: NORMAL

## 2021-10-27 PROCEDURE — 99213 OFFICE O/P EST LOW 20 MIN: CPT | Performed by: NURSE PRACTITIONER

## 2021-10-27 PROCEDURE — 81003 URINALYSIS AUTO W/O SCOPE: CPT | Performed by: NURSE PRACTITIONER

## 2021-10-27 RX ORDER — SULFAMETHOXAZOLE AND TRIMETHOPRIM 800; 160 MG/1; MG/1
1 TABLET ORAL 2 TIMES DAILY
Qty: 14 TABLET | Refills: 0 | Status: SHIPPED | OUTPATIENT
Start: 2021-10-27 | End: 2021-11-03

## 2021-10-27 ASSESSMENT — ENCOUNTER SYMPTOMS
DIARRHEA: 0
VOMITING: 0
SORE THROAT: 0
SINUS PRESSURE: 0
COUGH: 0
COLOR CHANGE: 0
NAUSEA: 0
WHEEZING: 0
ABDOMINAL DISTENTION: 0
RHINORRHEA: 0
SINUS PAIN: 0
CONSTIPATION: 0
CHEST TIGHTNESS: 0
SHORTNESS OF BREATH: 0
ABDOMINAL PAIN: 1

## 2021-10-27 NOTE — TELEPHONE ENCOUNTER
Attempted to contact the patient to let her know that there were potentially no appointments today, but the [facility] is always an option with the days/hours - No answer - LMOVM with all of this information.

## 2021-10-27 NOTE — PROGRESS NOTES
Subjective:      Patient ID: Preeti Dobson is a 49 y.o. female who presents today for:  Chief Complaint   Patient presents with    Urinary Tract Infection     x 2 days lower back pain, pain when urinating        HPI   Patient is here with c/o lower back pain and pain with urination for the last 2 days. Says she has had issues like this in the past. Has been to see GYN for similar sx and given ditropan that worke. Says odor to urine no blood. Reports flank pain on both side, worse on the left. Says she has never had kidney stone in the past.  Says she has been going more often, and has some urgency, has leakage at times, going small amounts. Some chills, no fever, more fatigued. Says she has no vaginal sx. Past Medical History:   Diagnosis Date    Arthritis     Asthma     Chronic fatigue 2017    Dizziness 2017    Hypertension     past trx x 1 yr -- off meds > 4 yrs    Hypothyroidism     meds > 3 yrs -- intermittently trx    Lightheadedness 2017    SOB (shortness of breath) 2017    Weakness 2017     Past Surgical History:   Procedure Laterality Date    FINGER TRIGGER RELEASE Left 2020    LEFT TRIGGER THUMB RELEASE.  HAND TRAY performed by Ketan Cárdenas MD at [facility] Right 2021    A1 PULLEY RELEASE OF THE RIGHT THUMB performed by Ketan Cárdenas MD at [address] [place] [address]      preseve ovaries    PARTIAL HYSTERECTOMY       Social History     Socioeconomic History    Marital status: Single     Spouse name: Not on file    Number of children: Not on file    Years of education: Not on file    Highest education level: Not on file   Occupational History    Not on file   Tobacco Use    Smoking status: Former Smoker     Packs/day: 0.10     Years: 13.00     Pack years: 1.30     Types: Cigarettes     Quit date: 10/14/2013     Years since quittin.0    Smokeless tobacco: Never Used   Vaping Use    Vaping Use: Never used   Substance and Sexual Activity    Alcohol use: No    Drug use: No    Sexual activity: Yes   Other Topics Concern    Not on file   Social History Narrative    Not on file     Social Determinants of Health     Financial Resource Strain: Low Risk     Difficulty of Paying Living Expenses: Not very hard   Food Insecurity: No Food Insecurity    Worried About Running Out of Food in the Last Year: Never true    Gerard of Food in the Last Year: Never true   Transportation Needs: No Transportation Needs    Lack of Transportation (Medical): No    Lack of Transportation (Non-Medical): No   Physical Activity:     Days of Exercise per Week:     Minutes of Exercise per Session:    Stress:     Feeling of Stress :    Social Connections:     Frequency of Communication with Friends and Family:     Frequency of Social Gatherings with Friends and Family:     Attends Spiritism Services:     Active Member of Clubs or Organizations:     Attends Club or Organization Meetings:     Marital Status:    Intimate Partner Violence:     Fear of Current or Ex-Partner:     Emotionally Abused:     Physically Abused:     Sexually Abused:      Family History   Problem Relation Age of Onset    Hypertension Mother     Thyroid Disease Mother     High Blood Pressure Mother     Diabetes Maternal Grandmother     Breast Cancer Sister     Heart Attack Brother          at age 62     Allergies   Allergen Reactions    Iv Contrast [Iodides] Hives     After injection of isovue 370 75 ml patient developed hives. Two noted total on forehead and right temporal area.     Pcn [Penicillins] Other (See Comments)     Syncope when given shot    Menthol-Methyl Salicylate Rash     Current Outpatient Medications   Medication Sig Dispense Refill    sulfamethoxazole-trimethoprim (BACTRIM DS;SEPTRA DS) 800-160 MG per tablet Take 1 tablet by mouth 2 times daily for 7 days 14 tablet 0    rOPINIRole (REQUIP) 0.25 MG tablet Start: 0.25 mg p.o. qHS x2 days, then may increase to 0.5 mg p.o. qHS x5 days then may increase by 0.5 mg/day weekly until 3 mg p.o. qHS. Stop titration at any lowest dose that has most efficacy. 90 tablet 0    oxybutynin (DITROPAN XL) 15 MG extended release tablet TAKE 1 TABLET BY MOUTH ONE TIME A DAY 30 tablet 6    ALPRAZolam (XANAX) 1 MG tablet TAKE 1 TABLET BY MOUTH AT NIGHT AS NEEDED FOR SLEEP OR ANXIETY FOR UP TO 90 DAYS. 90 tablet 0    meloxicam (MOBIC) 15 MG tablet TAKE 1 TABLET BY MOUTH EVERY DAY AFTER A MEAL UNTIL RELIEF 90 tablet 3    DULoxetine (CYMBALTA) 60 MG extended release capsule TAKE 1 CAPSULE BY MOUTH ONE TIME A DAY 90 capsule 1    omeprazole (PRILOSEC) 20 MG delayed release capsule TAKE 1 CAPSULE BY MOUTH ONE TIME A DAY 30 capsule 2    levothyroxine (SYNTHROID) 25 MCG tablet Take 1 tablet by mouth daily 30 tablet 11    montelukast (SINGULAIR) 10 MG tablet Take 1 tablet by mouth daily 30 tablet 3    fluticasone (ARNUITY ELLIPTA) 200 MCG/ACT AEPB Inhale 1 Inhaler into the lungs daily 30 each 3    ipratropium-albuterol (DUONEB) 0.5-2.5 (3) MG/3ML SOLN nebulizer solution Inhale 3 mLs into the lungs every 4 hours as needed for Shortness of Breath 360 mL 2     No current facility-administered medications for this visit. Review of Systems   Constitutional: Positive for activity change, chills and fatigue. Negative for appetite change, diaphoresis, fever and unexpected weight change. HENT: Negative for congestion, postnasal drip, rhinorrhea, sinus pressure, sinus pain and sore throat. Respiratory: Negative for cough, chest tightness, shortness of breath and wheezing. Cardiovascular: Negative for chest pain. Gastrointestinal: Positive for abdominal pain. Negative for abdominal distention, constipation, diarrhea, nausea and vomiting. Genitourinary: Positive for decreased urine volume, dysuria, enuresis, flank pain, frequency, pelvic pain and urgency.  Negative for difficulty urinating, dyspareunia, genital sores, hematuria, menstrual problem, vaginal bleeding, vaginal discharge and vaginal pain. Musculoskeletal: Positive for myalgias. Negative for arthralgias. Skin: Negative for color change and rash. Neurological: Negative for dizziness, weakness, light-headedness and headaches. Hematological: Negative for adenopathy. Objective:   /82 (Site: Left Upper Arm, Position: Sitting, Cuff Size: Medium Adult)   Pulse 72   Temp 97.4 °F (36.3 °C) (Temporal)   Ht 4' 11\" (1.499 m)   Wt 126 lb (57.2 kg)   SpO2 98%   BMI 25.45 kg/m²     Physical Exam  Vitals reviewed. Constitutional:       General: She is awake. She is not in acute distress. Appearance: Normal appearance. She is well-developed, well-groomed and normal weight. She is not ill-appearing, toxic-appearing or diaphoretic. HENT:      Head: Normocephalic and atraumatic. Right Ear: Hearing normal.      Left Ear: Hearing normal.   Eyes:      Extraocular Movements: Extraocular movements intact. Conjunctiva/sclera: Conjunctivae normal.   Cardiovascular:      Rate and Rhythm: Normal rate and regular rhythm. Pulses: Normal pulses. Heart sounds: Normal heart sounds, S1 normal and S2 normal.   Pulmonary:      Effort: Pulmonary effort is normal.      Breath sounds: Normal breath sounds and air entry. Abdominal:      General: Abdomen is flat. Bowel sounds are normal. There is no distension. Palpations: Abdomen is soft. There is no mass. Tenderness: There is abdominal tenderness in the suprapubic area. There is left CVA tenderness. There is no right CVA tenderness, guarding or rebound. Hernia: No hernia is present. Musculoskeletal:         General: Normal range of motion. Cervical back: Normal range of motion and neck supple. Lymphadenopathy:      Cervical: No cervical adenopathy. Skin:     General: Skin is warm and dry.       Capillary Refill: Capillary refill takes less times daily for 7 days      Orders Placed This Encounter   Procedures    Culture, Urine     Standing Status:   Future     Standing Expiration Date:   10/27/2022     Order Specific Question:   Specify (ex-cath, midstream, cysto, etc)? Answer:   CC    XR ABDOMEN (KUB) (SINGLE AP VIEW)     Standing Status:   Future     Standing Expiration Date:   10/27/2022     Order Specific Question:   Reason for exam:     Answer:   left flank pain 2 days.  POCT Urinalysis No Micro (Auto)     Orders Placed This Encounter   Medications    sulfamethoxazole-trimethoprim (BACTRIM DS;SEPTRA DS) 800-160 MG per tablet     Sig: Take 1 tablet by mouth 2 times daily for 7 days     Dispense:  14 tablet     Refill:  0     There are no discontinued medications. Return for if symptoms do not improve in 3-5 days. Reviewed with the patient/family: current clinical status & medications. Side effects of the medication prescribed today, as well as treatment plan/rationale and result expectations have been discussed with the patient/family who expresses understanding. Patient will be discharged home in stable condition. Follow up with PCP to evaluate treatment results or return if symptoms worsen or fail to improve. Discussed signs and symptoms which require immediate follow-up in ED/call to 911. Understanding verbalized. I have reviewed the patient's medical history in detail and updated the computerized patient record.     Daxa Howell, APRN - CNP

## 2021-10-27 NOTE — PATIENT INSTRUCTIONS
Patient Education        Urinary Tract Infection (UTI) in Women: Care Instructions  Overview     A urinary tract infection, or UTI, is a general term for an infection anywhere between the kidneys and the urethra (where urine comes out). Most UTIs are bladder infections. They often cause pain or burning when you urinate. UTIs are caused by bacteria and can be cured with antibiotics. Be sure to complete your treatment so that the infection does not get worse. Follow-up care is a key part of your treatment and safety. Be sure to make and go to all appointments, and call your doctor if you are having problems. It's also a good idea to know your test results and keep a list of the medicines you take. How can you care for yourself at home? · Take your antibiotics as directed. Do not stop taking them just because you feel better. You need to take the full course of antibiotics. · Drink extra water and other fluids for the next day or two. This will help make the urine less concentrated and help wash out the bacteria that are causing the infection. (If you have kidney, heart, or liver disease and have to limit fluids, talk with your doctor before you increase the amount of fluids you drink.)  · Avoid drinks that are carbonated or have caffeine. They can irritate the bladder. · Urinate often. Try to empty your bladder each time. · To relieve pain, take a hot bath or lay a heating pad set on low over your lower belly or genital area. Never go to sleep with a heating pad in place. To prevent UTIs  · Drink plenty of water each day. This helps you urinate often, which clears bacteria from your system. (If you have kidney, heart, or liver disease and have to limit fluids, talk with your doctor before you increase the amount of fluids you drink.)  · Urinate when you need to. · If you are sexually active, urinate right after you have sex. · Change sanitary pads often.   · Avoid douches, bubble baths, feminine hygiene sprays, and other feminine hygiene products that have deodorants. · After going to the bathroom, wipe from front to back. When should you call for help? Call your doctor now or seek immediate medical care if:    · Symptoms such as fever, chills, nausea, or vomiting get worse or appear for the first time.     · You have new pain in your back just below your rib cage. This is called flank pain.     · There is new blood or pus in your urine.     · You have any problems with your antibiotic medicine. Watch closely for changes in your health, and be sure to contact your doctor if:    · You are not getting better after taking an antibiotic for 2 days.     · Your symptoms go away but then come back. Where can you learn more? Go to https://Featherlight.DigitalOcean. org and sign in to your PWA account. Enter B971 in the TinderBox box to learn more about \"Urinary Tract Infection (UTI) in Women: Care Instructions. \"     If you do not have an account, please click on the \"Sign Up Now\" link. Current as of: February 10, 2021               Content Version: 13.0  © 2978-1858 Healthwise, Incorporated. Care instructions adapted under license by [facility] [place] ([facility]). If you have questions about a medical condition or this instruction, always ask your healthcare professional. [address] any warranty or liability for your use of this information.

## 2021-10-29 LAB — URINE CULTURE, ROUTINE: NORMAL

## 2021-11-01 RX ORDER — UBROGEPANT 100 MG/1
100 TABLET ORAL PRN
Qty: 2 TABLET | Refills: 0 | COMMUNITY
Start: 2021-11-01 | End: 2022-05-27 | Stop reason: CLARIF

## 2021-11-01 RX ORDER — RIMEGEPANT SULFATE 75 MG/75MG
75 TABLET, ORALLY DISINTEGRATING ORAL PRN
Qty: 6 TABLET | Refills: 0 | COMMUNITY
Start: 2021-11-01

## 2021-11-13 ENCOUNTER — NURSE TRIAGE (OUTPATIENT)
Dept: OTHER | Facility: CLINIC | Age: 56
End: 2021-11-13

## 2021-11-13 NOTE — TELEPHONE ENCOUNTER
Location of employment: [employer]      Location of injury (body part involved): Left wrist     Time of injury: 11/13/2021 3:40pm     Last 4 of SSN: [id number]    Triage indicates for caller to home care and care advice. Caller directed to take care of symptoms at home. Care advice provided, caller verbalizes understanding; denies any other questions or concerns. Reason for Disposition   Minor injury or pain from twisting or over-stretching    Answer Assessment - Initial Assessment Questions  1. MECHANISM: \"How did the injury happen? \"      A patient twisted her wrist     2. ONSET: \"When did the injury happen? \" (Minutes or hours ago)       Today     3. APPEARANCE of INJURY: \"What does the injury look like? \"       No swelling, redness or visible injury. 4. SEVERITY: \"Can you use the hand normally? \" \"Can you bend your fingers into a ball and then fully open them? \"      Yes, she can more her hand normally. 5. SIZE: For cuts, bruises, or swelling, ask: \"How large is it? \" (e.g., inches or centimeters;  entire hand or wrist)       None    6. PAIN: \"Is there pain? \" If so, ask: \"How bad is the pain? \"  (Scale 1-10; or mild, moderate, severe)      2-3/10    7. TETANUS: For any breaks in the skin, ask: \"When was the last tetanus booster? \"     N/a    8. OTHER SYMPTOMS: \"Do you have any other symptoms? \"       None    9. PREGNANCY: \"Is there any chance you are pregnant? \" \"When was your last menstrual period? \"      N/a    Protocols used: HAND AND WRIST INJURY-ADULT-

## 2021-11-18 ENCOUNTER — HOSPITAL ENCOUNTER (OUTPATIENT)
Dept: MRI IMAGING | Age: 56
Discharge: HOME | End: 2021-11-20
Payer: COMMERCIAL

## 2021-11-18 DIAGNOSIS — R53.82 CHRONIC FATIGUE: ICD-10-CM

## 2021-11-18 DIAGNOSIS — M79.10 MUSCLE PAIN: ICD-10-CM

## 2021-11-18 DIAGNOSIS — R20.0 NUMBNESS AND TINGLING: ICD-10-CM

## 2021-11-18 DIAGNOSIS — R20.2 NUMBNESS AND TINGLING: ICD-10-CM

## 2021-11-18 PROCEDURE — 70553 MRI BRAIN STEM W/O & W/DYE: CPT

## 2021-11-18 PROCEDURE — A9579 GAD-BASE MR CONTRAST NOS,1ML: HCPCS | Performed by: STUDENT IN AN ORGANIZED HEALTH CARE EDUCATION/TRAINING PROGRAM

## 2021-11-18 PROCEDURE — 6360000004 HC RX CONTRAST MEDICATION: Performed by: STUDENT IN AN ORGANIZED HEALTH CARE EDUCATION/TRAINING PROGRAM

## 2021-11-18 RX ORDER — SODIUM CHLORIDE 0.9 % (FLUSH) 0.9 %
10 SYRINGE (ML) INJECTION PRN
Status: DISCONTINUED | OUTPATIENT
Start: 2021-11-18 | End: 2021-11-21 | Stop reason: HOSPADM

## 2021-11-18 RX ADMIN — GADOTERIDOL 10 ML: 279.3 INJECTION, SOLUTION INTRAVENOUS at 14:13

## 2021-11-30 ENCOUNTER — OFFICE VISIT (OUTPATIENT)
Dept: FAMILY MEDICINE CLINIC | Age: 56
End: 2021-11-30
Payer: COMMERCIAL

## 2021-11-30 VITALS
BODY MASS INDEX: 25.6 KG/M2 | DIASTOLIC BLOOD PRESSURE: 78 MMHG | WEIGHT: 127 LBS | SYSTOLIC BLOOD PRESSURE: 128 MMHG | HEIGHT: 59 IN | HEART RATE: 88 BPM | RESPIRATION RATE: 15 BRPM

## 2021-11-30 DIAGNOSIS — E03.9 ACQUIRED HYPOTHYROIDISM: ICD-10-CM

## 2021-11-30 DIAGNOSIS — G47.00 INSOMNIA, UNSPECIFIED TYPE: Primary | ICD-10-CM

## 2021-11-30 DIAGNOSIS — F41.9 ANXIETY: ICD-10-CM

## 2021-11-30 PROCEDURE — 99214 OFFICE O/P EST MOD 30 MIN: CPT | Performed by: NURSE PRACTITIONER

## 2021-11-30 RX ORDER — ALPRAZOLAM 1 MG/1
TABLET ORAL
Qty: 90 TABLET | Refills: 0 | Status: SHIPPED | OUTPATIENT
Start: 2021-11-30 | End: 2022-01-11

## 2021-11-30 ASSESSMENT — ENCOUNTER SYMPTOMS
SORE THROAT: 0
BELCHING: 0
VISUAL CHANGE: 0
CHOKING: 0
NAUSEA: 0
HEARTBURN: 0
ABDOMINAL PAIN: 0
STRIDOR: 0
WATER BRASH: 0
SWOLLEN GLANDS: 0
CHANGE IN BOWEL HABIT: 0
HOARSE VOICE: 0
COUGH: 0
VOMITING: 0
BACK PAIN: 1
WHEEZING: 0
GLOBUS SENSATION: 0

## 2021-11-30 NOTE — PROGRESS NOTES
Rafael Canseco (:  1965) is a 49 y.o. female,Established patient, here for evaluation of the following chief complaint(s):  3 Month Follow-Up, Anxiety, and Insomnia      ASSESSMENT/PLAN:  1. Insomnia, unspecified type  -     ALPRAZolam (XANAX) 1 MG tablet; TAKE 1 TABLET BY MOUTH AT NIGHT AS NEEDED FOR SLEEP OR ANXIETY FOR UP TO 90 DAYS., Disp-90 tablet, R-0Normal  2. Anxiety  -     ALPRAZolam (XANAX) 1 MG tablet; TAKE 1 TABLET BY MOUTH AT NIGHT AS NEEDED FOR SLEEP OR ANXIETY FOR UP TO 90 DAYS., Disp-90 tablet, R-0Normal  3. Acquired hypothyroidism  -     TSH with Reflex; Future  -     Lipid Panel; Future  -     Comprehensive Metabolic Panel; Future      No follow-ups on file. SUBJECTIVE/OBJECTIVE:  RLS:  controlled    Hypothyroidism: Patient presents for evaluation of thyroid function. Symptoms consist of denies fatigue, weight changes, heat/cold intolerance, bowel/skin changes or CVS symptoms. The symptoms are none. The problem has been controlled. Previous thyroid studies include TSH. The hypothyroidism is due to hypothyroidism and Hashimoto's disease    Gastroesophageal Reflux  She reports no abdominal pain, no belching, no chest pain, no choking, no coughing, no dysphagia, no early satiety, no globus sensation, no heartburn, no hoarse voice, no nausea, no sore throat, no stridor, no tooth decay, no water brash or no wheezing. This is a new (over the past 3 months) problem. The current episode started more than 1 month ago. The problem occurs constantly (worse at night). The problem has been unchanged. The heartburn duration is several minutes. The heartburn is located in the abdomen and substernum. The heartburn is of moderate intensity. The heartburn wakes her from sleep. The heartburn does not limit her activity. The heartburn changes (worse with laying laying flat ) with position. The symptoms are aggravated by lying down, caffeine and certain foods. Associated symptoms include fatigue. Pertinent negatives include no anemia, melena, muscle weakness, orthopnea or weight loss. There are no known risk factors. She has tried nothing for the symptoms. Past procedures do not include an abdominal ultrasound, an EGD, esophageal manometry, esophageal pH monitoring, H. pylori antibody titer or a UGI. Past invasive treatments do not include gastroplasty, gastroplication or reflux surgery. Fatigue  This is a chronic problem. The current episode started more than 1 year ago. The problem occurs constantly. The problem has been unchanged. Associated symptoms include arthralgias, fatigue and myalgias. Pertinent negatives include no abdominal pain, anorexia, change in bowel habit, chest pain, chills, congestion, coughing, diaphoresis, fever, headaches, joint swelling, nausea, neck pain, numbness, rash, sore throat, swollen glands, urinary symptoms, vertigo, visual change, vomiting or weakness. Nothing aggravates the symptoms. Treatments tried: Multiple lab work-ups. The treatment provided no relief. Controlled Substance Monitoring:    Acute and Chronic Pain Monitoring:   RX Monitoring 11/30/2021   Attestation -   Acute Pain Prescriptions -   Periodic Controlled Substance Monitoring Possible medication side effects, risk of tolerance/dependence & alternative treatments discussed. ;No signs of potential drug abuse or diversion identified. ;Assessed functional status. Chronic Pain > 50 MEDD -         Review of Systems   Constitutional: Positive for fatigue. Negative for chills, diaphoresis, fever and weight loss. HENT: Negative for congestion, hoarse voice and sore throat. Respiratory: Negative for cough, choking and wheezing. Cardiovascular: Negative for chest pain. Gastrointestinal: Negative for abdominal pain, anorexia, change in bowel habit, dysphagia, heartburn, melena, nausea and vomiting. Genitourinary: Positive for frequency and urgency.    Musculoskeletal: Positive for arthralgias, back pain and myalgias. Negative for joint swelling, muscle weakness and neck pain. Skin: Negative for rash. Neurological: Negative for vertigo, weakness, numbness and headaches. Physical Exam  Constitutional:       General: She is not in acute distress. Appearance: She is well-developed. HENT:      Head: Normocephalic and atraumatic. Right Ear: Tympanic membrane and external ear normal.      Left Ear: Tympanic membrane and external ear normal.      Nose: Nose normal.   Eyes:      Conjunctiva/sclera: Conjunctivae normal.      Pupils: Pupils are equal, round, and reactive to light. Neck:      Thyroid: Thyromegaly present. No thyroid mass or thyroid tenderness. Vascular: No JVD. Cardiovascular:      Rate and Rhythm: Normal rate and regular rhythm. Pulses: Normal pulses. Heart sounds: Normal heart sounds. No murmur heard. Pulmonary:      Effort: Pulmonary effort is normal. No respiratory distress. Breath sounds: Normal breath sounds. No wheezing or rales. Abdominal:      General: Bowel sounds are normal. There is no distension. Palpations: Abdomen is soft. There is no mass. Tenderness: There is no abdominal tenderness. Musculoskeletal:      Cervical back: Normal range of motion and neck supple. Lymphadenopathy:      Cervical: No cervical adenopathy. Skin:     General: Skin is warm and dry. Capillary Refill: Capillary refill takes less than 2 seconds. Neurological:      Mental Status: She is alert and oriented to person, place, and time. On this date 11/30/21 I have spent 30 minutes reviewing previous notes, test results and face to face with the patient discussing the diagnosis and importance of compliance with the treatment plan as well as documenting on the day of the visit. An electronic signature was used to authenticate this note.     --VALENTIN Montero - CNP

## 2021-12-01 DIAGNOSIS — F41.9 ANXIETY: ICD-10-CM

## 2021-12-01 DIAGNOSIS — G47.00 INSOMNIA, UNSPECIFIED TYPE: ICD-10-CM

## 2021-12-01 LAB
ALBUMIN SERPL-MCNC: 4.3 G/DL (ref 3.5–4.6)
ALP BLD-CCNC: 122 U/L (ref 40–130)
ALT SERPL-CCNC: 26 U/L (ref 0–33)
ANION GAP SERPL CALCULATED.3IONS-SCNC: 10 MEQ/L (ref 9–15)
AST SERPL-CCNC: 28 U/L (ref 0–35)
BILIRUB SERPL-MCNC: 0.3 MG/DL (ref 0.2–0.7)
BUN BLDV-MCNC: 14 MG/DL (ref 6–20)
CALCIUM SERPL-MCNC: 9.9 MG/DL (ref 8.5–9.9)
CHLORIDE BLD-SCNC: 101 MEQ/L (ref 95–107)
CHOLESTEROL, TOTAL: 210 MG/DL (ref 0–199)
CO2: 29 MEQ/L (ref 20–31)
CREAT SERPL-MCNC: 0.66 MG/DL (ref 0.5–0.9)
GFR AFRICAN AMERICAN: >60
GFR NON-AFRICAN AMERICAN: >60
GLOBULIN: 2.7 G/DL (ref 2.3–3.5)
GLUCOSE BLD-MCNC: 83 MG/DL (ref 70–99)
HDLC SERPL-MCNC: 66 MG/DL (ref 40–59)
LDL CHOLESTEROL CALCULATED: 126 MG/DL (ref 0–129)
POTASSIUM SERPL-SCNC: 4.7 MEQ/L (ref 3.4–4.9)
SODIUM BLD-SCNC: 140 MEQ/L (ref 135–144)
TOTAL PROTEIN: 7 G/DL (ref 6.3–8)
TRIGL SERPL-MCNC: 91 MG/DL (ref 0–150)
TSH REFLEX: 2.79 UIU/ML (ref 0.44–3.86)

## 2021-12-01 RX ORDER — ALPRAZOLAM 1 MG/1
TABLET ORAL
Qty: 90 TABLET | Refills: 0 | OUTPATIENT
Start: 2021-12-01

## 2022-01-06 DIAGNOSIS — G47.00 INSOMNIA, UNSPECIFIED TYPE: ICD-10-CM

## 2022-01-06 DIAGNOSIS — F41.9 ANXIETY: ICD-10-CM

## 2022-01-11 RX ORDER — ALPRAZOLAM 1 MG/1
1 TABLET ORAL NIGHTLY PRN
Qty: 90 TABLET | Refills: 0 | Status: SHIPPED | OUTPATIENT
Start: 2022-01-11 | End: 2022-04-08

## 2022-01-11 NOTE — TELEPHONE ENCOUNTER
Future Appointments    Encounter Information    Provider Department Appt Notes   1/18/2022 JOSÉ MIGUEL Diaz Neurology [facility]   2/28/2022 Sophy Bustillos, APRN - [address] [facility] 3 month follow up        Recent Visits    11/30/2021 Insomnia, unspecified type   [facility] [address], APRN - CNP

## 2022-01-17 PROBLEM — R20.2 NUMBNESS AND TINGLING: Status: ACTIVE | Noted: 2022-01-17

## 2022-01-17 PROBLEM — M79.10 MUSCLE PAIN: Status: ACTIVE | Noted: 2022-01-17

## 2022-01-17 PROBLEM — G89.29 CHRONIC HEADACHE: Status: ACTIVE | Noted: 2022-01-17

## 2022-01-17 PROBLEM — R20.0 NUMBNESS AND TINGLING: Status: ACTIVE | Noted: 2022-01-17

## 2022-01-17 PROBLEM — R51.9 CHRONIC HEADACHE: Status: ACTIVE | Noted: 2022-01-17

## 2022-01-17 PROBLEM — G47.61 PERIODIC LIMB MOVEMENT DISORDER (PLMD): Status: ACTIVE | Noted: 2022-01-17

## 2022-01-19 DIAGNOSIS — K21.9 GASTROESOPHAGEAL REFLUX DISEASE: ICD-10-CM

## 2022-01-20 RX ORDER — OMEPRAZOLE 20 MG/1
CAPSULE, DELAYED RELEASE ORAL
Qty: 30 CAPSULE | Refills: 5 | Status: SHIPPED | OUTPATIENT
Start: 2022-01-20

## 2022-02-28 ENCOUNTER — OFFICE VISIT (OUTPATIENT)
Dept: FAMILY MEDICINE CLINIC | Age: 57
End: 2022-02-28
Payer: COMMERCIAL

## 2022-02-28 VITALS
RESPIRATION RATE: 16 BRPM | BODY MASS INDEX: 26.21 KG/M2 | HEIGHT: 59 IN | HEART RATE: 78 BPM | DIASTOLIC BLOOD PRESSURE: 70 MMHG | SYSTOLIC BLOOD PRESSURE: 116 MMHG | WEIGHT: 130 LBS

## 2022-02-28 DIAGNOSIS — G47.00 INSOMNIA, UNSPECIFIED TYPE: ICD-10-CM

## 2022-02-28 DIAGNOSIS — F41.9 ANXIETY: ICD-10-CM

## 2022-02-28 PROCEDURE — 99214 OFFICE O/P EST MOD 30 MIN: CPT | Performed by: NURSE PRACTITIONER

## 2022-02-28 RX ORDER — DOXEPIN HYDROCHLORIDE 10 MG/1
10 CAPSULE ORAL NIGHTLY
Qty: 90 CAPSULE | Refills: 3 | Status: SHIPPED | OUTPATIENT
Start: 2022-02-28

## 2022-02-28 SDOH — ECONOMIC STABILITY: FOOD INSECURITY: WITHIN THE PAST 12 MONTHS, THE FOOD YOU BOUGHT JUST DIDN'T LAST AND YOU DIDN'T HAVE MONEY TO GET MORE.: NEVER TRUE

## 2022-02-28 SDOH — ECONOMIC STABILITY: FOOD INSECURITY: WITHIN THE PAST 12 MONTHS, YOU WORRIED THAT YOUR FOOD WOULD RUN OUT BEFORE YOU GOT MONEY TO BUY MORE.: NEVER TRUE

## 2022-02-28 ASSESSMENT — PATIENT HEALTH QUESTIONNAIRE - PHQ9
SUM OF ALL RESPONSES TO PHQ QUESTIONS 1-9: 0
1. LITTLE INTEREST OR PLEASURE IN DOING THINGS: 0
SUM OF ALL RESPONSES TO PHQ QUESTIONS 1-9: 0
2. FEELING DOWN, DEPRESSED OR HOPELESS: 0
SUM OF ALL RESPONSES TO PHQ QUESTIONS 1-9: 0
SUM OF ALL RESPONSES TO PHQ9 QUESTIONS 1 & 2: 0
SUM OF ALL RESPONSES TO PHQ QUESTIONS 1-9: 0

## 2022-02-28 ASSESSMENT — ENCOUNTER SYMPTOMS
ABDOMINAL PAIN: 0
NAUSEA: 0
STRIDOR: 0
VISUAL CHANGE: 0
CHOKING: 0
VOMITING: 0
CHANGE IN BOWEL HABIT: 0
SWOLLEN GLANDS: 0
HEARTBURN: 0
WHEEZING: 0
BELCHING: 0
BACK PAIN: 1
GLOBUS SENSATION: 0
HOARSE VOICE: 0
COUGH: 0
WATER BRASH: 0
SORE THROAT: 0

## 2022-02-28 ASSESSMENT — SOCIAL DETERMINANTS OF HEALTH (SDOH): HOW HARD IS IT FOR YOU TO PAY FOR THE VERY BASICS LIKE FOOD, HOUSING, MEDICAL CARE, AND HEATING?: NOT HARD AT ALL

## 2022-02-28 NOTE — PROGRESS NOTES
Lety Hernandez (:  1965) is a 49 y.o. female,Established patient, here for evaluation of the following chief complaint(s):  3 Month Follow-Up, Insomnia, Anxiety, and Hypothyroidism      ASSESSMENT/PLAN:  1. Insomnia, unspecified type  2. Anxiety      No follow-ups on file. SUBJECTIVE/OBJECTIVE:  RLS:  controlled    Hypothyroidism: Patient presents for evaluation of thyroid function. Symptoms consist of denies fatigue, weight changes, heat/cold intolerance, bowel/skin changes or CVS symptoms. The symptoms are none. The problem has been controlled. Previous thyroid studies include TSH. The hypothyroidism is due to hypothyroidism and Hashimoto's disease    Gastroesophageal Reflux  She reports no abdominal pain, no belching, no chest pain, no choking, no coughing, no dysphagia, no early satiety, no globus sensation, no heartburn, no hoarse voice, no nausea, no sore throat, no stridor, no tooth decay, no water brash or no wheezing. This is a new (over the past 3 months) problem. The current episode started more than 1 month ago. The problem occurs constantly (worse at night). The problem has been unchanged. The heartburn duration is several minutes. The heartburn is located in the abdomen and substernum. The heartburn is of moderate intensity. The heartburn wakes her from sleep. The heartburn does not limit her activity. The heartburn changes (worse with laying laying flat ) with position. The symptoms are aggravated by lying down, caffeine and certain foods. Associated symptoms include fatigue. Pertinent negatives include no anemia, melena, muscle weakness, orthopnea or weight loss. There are no known risk factors. She has tried nothing for the symptoms. Past procedures do not include an abdominal ultrasound, an EGD, esophageal manometry, esophageal pH monitoring, H. pylori antibody titer or a UGI. Past invasive treatments do not include gastroplasty, gastroplication or reflux surgery.    Fatigue  This is a chronic problem. The current episode started more than 1 year ago. The problem occurs constantly. The problem has been unchanged. Associated symptoms include arthralgias, fatigue and myalgias. Pertinent negatives include no abdominal pain, anorexia, change in bowel habit, chest pain, chills, congestion, coughing, diaphoresis, fever, headaches, joint swelling, nausea, neck pain, numbness, rash, sore throat, swollen glands, urinary symptoms, vertigo, visual change, vomiting or weakness. Nothing aggravates the symptoms. Treatments tried: Multiple lab work-ups. The treatment provided no relief. Controlled Substance Monitoring:    Acute and Chronic Pain Monitoring:   RX Monitoring 2/28/2022   Attestation -   Acute Pain Prescriptions -   Periodic Controlled Substance Monitoring Possible medication side effects, risk of tolerance/dependence & alternative treatments discussed. ;No signs of potential drug abuse or diversion identified. ;Assessed functional status. Chronic Pain > 50 MEDD -         Review of Systems   Constitutional: Positive for fatigue. Negative for chills, diaphoresis, fever and weight loss. HENT: Negative for congestion, hoarse voice and sore throat. Respiratory: Negative for cough, choking and wheezing. Cardiovascular: Negative for chest pain. Gastrointestinal: Negative for abdominal pain, anorexia, change in bowel habit, dysphagia, heartburn, melena, nausea and vomiting. Genitourinary: Positive for frequency and urgency. Musculoskeletal: Positive for arthralgias, back pain and myalgias. Negative for joint swelling, muscle weakness and neck pain. Skin: Negative for rash. Neurological: Negative for vertigo, weakness, numbness and headaches. Physical Exam  Constitutional:       General: She is not in acute distress. Appearance: She is well-developed. HENT:      Head: Normocephalic and atraumatic.       Right Ear: Tympanic membrane and external ear normal.      Left Ear: Tympanic membrane and external ear normal.      Nose: Nose normal.   Eyes:      Conjunctiva/sclera: Conjunctivae normal.      Pupils: Pupils are equal, round, and reactive to light. Neck:      Thyroid: Thyromegaly present. No thyroid mass or thyroid tenderness. Vascular: No JVD. Cardiovascular:      Rate and Rhythm: Normal rate and regular rhythm. Pulses: Normal pulses. Heart sounds: Normal heart sounds. No murmur heard. Pulmonary:      Effort: Pulmonary effort is normal. No respiratory distress. Breath sounds: Normal breath sounds. No wheezing or rales. Abdominal:      General: Bowel sounds are normal. There is no distension. Palpations: Abdomen is soft. There is no mass. Tenderness: There is no abdominal tenderness. Musculoskeletal:      Cervical back: Normal range of motion and neck supple. Lymphadenopathy:      Cervical: No cervical adenopathy. Skin:     General: Skin is warm and dry. Capillary Refill: Capillary refill takes less than 2 seconds. Neurological:      Mental Status: She is alert and oriented to person, place, and time. On this date 02/28/22 I have spent 30 minutes reviewing previous notes, test results and face to face with the patient discussing the diagnosis and importance of compliance with the treatment plan as well as documenting on the day of the visit. An electronic signature was used to authenticate this note.     --VALENTIN Kwok - CNP

## 2022-03-16 ENCOUNTER — OFFICE VISIT (OUTPATIENT)
Dept: ORTHOPEDIC SURGERY | Age: 57
End: 2022-03-16
Payer: COMMERCIAL

## 2022-03-16 VITALS
OXYGEN SATURATION: 97 % | WEIGHT: 130 LBS | TEMPERATURE: 97 F | BODY MASS INDEX: 26.21 KG/M2 | HEART RATE: 71 BPM | HEIGHT: 59 IN

## 2022-03-16 DIAGNOSIS — M75.81 ROTATOR CUFF TENDINITIS, RIGHT: Primary | ICD-10-CM

## 2022-03-16 PROCEDURE — 20610 DRAIN/INJ JOINT/BURSA W/O US: CPT | Performed by: PHYSICIAN ASSISTANT

## 2022-03-16 PROCEDURE — 99203 OFFICE O/P NEW LOW 30 MIN: CPT | Performed by: PHYSICIAN ASSISTANT

## 2022-03-16 RX ORDER — LIDOCAINE HYDROCHLORIDE 10 MG/ML
8 INJECTION, SOLUTION INFILTRATION; PERINEURAL ONCE
Status: COMPLETED | OUTPATIENT
Start: 2022-03-16 | End: 2022-03-16

## 2022-03-16 RX ORDER — TRIAMCINOLONE ACETONIDE 40 MG/ML
80 INJECTION, SUSPENSION INTRA-ARTICULAR; INTRAMUSCULAR ONCE
Status: COMPLETED | OUTPATIENT
Start: 2022-03-16 | End: 2022-03-16

## 2022-03-16 RX ADMIN — LIDOCAINE HYDROCHLORIDE 8 ML: 10 INJECTION, SOLUTION INFILTRATION; PERINEURAL at 14:05

## 2022-03-16 RX ADMIN — TRIAMCINOLONE ACETONIDE 80 MG: 40 INJECTION, SUSPENSION INTRA-ARTICULAR; INTRAMUSCULAR at 14:07

## 2022-03-16 ASSESSMENT — ENCOUNTER SYMPTOMS
RESPIRATORY NEGATIVE: 1
EYES NEGATIVE: 1
GASTROINTESTINAL NEGATIVE: 1

## 2022-03-16 NOTE — PATIENT INSTRUCTIONS
Patient Education        Learning About Joint Injections  What are joint injections? Joint injections are shots into a joint, such as the knee or shoulder. They are used to put in medicines, such as pain relievers and steroid medicines. Steroids can help reduce inflammation. A steroid shot can sometimes help with short-term pain relief when other treatments haven't worked. How are they done? First, the area over the joint will be cleaned. Your doctor may then use a tiny needle to numb the skin in the area where you will get the joint injection. If a tiny needle is used to numb the area, your doctor will use another needle to inject the medicine. Your doctor may use a pain reliever, a steroid, or both. You may feel some pressure or discomfort. Your doctor may put ice on the area before you go home. What can you expect after a joint injection? You will probably go home soon after your shot. You may have numbness around the joint for a few hours. If your shot included both a pain reliever and a steroid, then the pain will probably go away right away. But it might come back after a few hours. This might happen if the pain reliever wears off and the steroid hasn't started to work yet. Steroids don't always work. But when they do, the pain relief can last for several days to a few months or longer. Your doctor may tell you to use ice on the area. You can also use ice if the pain comes back. Put ice or a cold pack on your joint for 10 to 20 minutes at a time. Put a thin cloth between the ice and your skin. Follow your doctor's instructions carefully. Follow-up care is a key part of your treatment and safety. Be sure to make and go to all appointments, and call your doctor if you are having problems. It's also a good idea to know your test results and keep a list of the medicines you take. Where can you learn more? Go to https://radha.Nord[employer] Neurostim. org and sign in to your Productify account.  Enter O039 in the [facility] box to learn more about \"Learning About Joint Injections. \"     If you do not have an account, please click on the \"Sign Up Now\" link. Current as of: July 1, 2021               Content Version: 13.1  © 2180-8225 Healthwise, Incorporated. Care instructions adapted under license by [facility] ([facility]). If you have questions about a medical condition or this instruction, always ask your healthcare professional. [address] any warranty or liability for your use of this information.

## 2022-03-16 NOTE — PROGRESS NOTES
Patient's name, date of birth, and allergies have been confirmed. Patient is aware that injection is to be given in Right shoulder. He/she is aware that they will be seeing [facility] and the injection will be given by him.

## 2022-03-16 NOTE — PROGRESS NOTES
Patient's name, date of birth, and allergies have been confirmed. Patient is aware that injection is to be given in Right shoulder. He/she is aware that they will be seeing [facility] and the injection will be given by him.

## 2022-03-16 NOTE — PROGRESS NOTES
Chin York (:  1965) is a 56 y.o. female,New patient, here for evaluation of the following chief complaint(s):  Shoulder Pain (The patient c/o chronic right shoulder pain. The patient would like to discuss having a cortisone injection today. Her pain is rated as a 8/10 today. The patient states that her pain radiates from the front of her shoulder down her bicep. )         ASSESSMENT/PLAN:  1. Rotator cuff tendinitis, right  -     PA ARTHROCENTESIS ASPIR&/INJ MAJOR JT/BURSA W/O US      No follow-ups on file. Subjective   SUBJECTIVE/OBJECTIVE:  This is a 45-year-old right-hand-dominant female. She complains of right shoulder pain for the last several years. 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 She denies any recent injury. She has attended physical therapy. She was continued on her home exercise program but continues to have difficulty with sleeping on her right side due to shoulder pain. She has difficulty raising her arm repetitively overhead. She denies change in sensation in the right hand. She has had trigger finger release of the right thumb. Review of Systems   Constitutional: Negative. HENT: Negative. Eyes: Negative. Respiratory: Negative. Gastrointestinal: Negative. Genitourinary: Negative. Musculoskeletal: Negative. Psychiatric/Behavioral: Negative. Objective   Physical Exam  Musculoskeletal:      Comments: Right shoulder-no acromioclavicular, clavicle, SC joint tenderness with palpation. Abduction and abduction strength is slightly decreased in comparison to the left. Internal rotation is 0 degrees, external rotation is 120 degrees about 15 degrees less than the left. Supraspinatus test elicits pain but no specific weakness. Apprehension sign is negative. Spurling's is negative. Impingement signs of Neer's and Aguilera are negative. Biceps contour is normal.  Sensation is intact distally to light touch. Shoulder Injection Procedure Note    Pre-operative Diagnosis: right rotator cuff tendinitis    Post-operative Diagnosis: same    Indications: Diagnostic    Anesthesia: Ethyl chloride     Procedure Details     Verbal consent was obtained for the procedure. The shoulder was prepped with iodine and the skin was anesthetized. Using a 22 gauge needle the subacromial space is injected with 8 mL 1% lidocaine and 2 mL of triamcinolone (KENALOG) 40mg/ml under the posterior aspect of the acromion. The injection site was cleansed with topical isopropyl alcohol and a dressing was applied. Complications:  None; patient tolerated the procedure well. On this date 3/16/2022 I have spent 20 minutes reviewing previous notes, test results and face to face with the patient discussing the diagnosis and importance of compliance with the treatment plan as well as documenting on the day of the visit. An electronic signature was used to authenticate this note.     --JW Diaz

## 2022-04-07 DIAGNOSIS — F41.9 ANXIETY: ICD-10-CM

## 2022-04-07 DIAGNOSIS — G47.00 INSOMNIA, UNSPECIFIED TYPE: ICD-10-CM

## 2022-04-08 RX ORDER — ALPRAZOLAM 1 MG/1
TABLET ORAL
Qty: 90 TABLET | Refills: 0 | Status: SHIPPED | OUTPATIENT
Start: 2022-04-08 | End: 2022-05-27 | Stop reason: SDUPTHER

## 2022-04-28 RX ORDER — METRONIDAZOLE 500 MG/1
500 TABLET ORAL 2 TIMES DAILY
Qty: 14 TABLET | Refills: 0 | Status: SHIPPED | OUTPATIENT
Start: 2022-04-28 | End: 2022-05-05

## 2022-05-17 RX ORDER — LEVOTHYROXINE SODIUM 0.03 MG/1
25 TABLET ORAL DAILY
Qty: 30 TABLET | Refills: 5 | Status: SHIPPED | OUTPATIENT
Start: 2022-05-17

## 2022-05-20 RX ORDER — LEVOTHYROXINE SODIUM 0.03 MG/1
TABLET ORAL
Qty: 30 TABLET | Refills: 11 | OUTPATIENT
Start: 2022-05-20

## 2022-05-27 ENCOUNTER — OFFICE VISIT (OUTPATIENT)
Dept: FAMILY MEDICINE CLINIC | Age: 57
End: 2022-05-27
Payer: COMMERCIAL

## 2022-05-27 VITALS
SYSTOLIC BLOOD PRESSURE: 120 MMHG | DIASTOLIC BLOOD PRESSURE: 70 MMHG | HEART RATE: 84 BPM | HEIGHT: 59 IN | RESPIRATION RATE: 15 BRPM | WEIGHT: 138 LBS | BODY MASS INDEX: 27.82 KG/M2

## 2022-05-27 DIAGNOSIS — G47.00 INSOMNIA, UNSPECIFIED TYPE: ICD-10-CM

## 2022-05-27 DIAGNOSIS — F51.01 PRIMARY INSOMNIA: ICD-10-CM

## 2022-05-27 DIAGNOSIS — E03.9 ACQUIRED HYPOTHYROIDISM: Primary | ICD-10-CM

## 2022-05-27 DIAGNOSIS — Z12.31 ENCOUNTER FOR SCREENING MAMMOGRAM FOR MALIGNANT NEOPLASM OF BREAST: ICD-10-CM

## 2022-05-27 DIAGNOSIS — E03.9 ACQUIRED HYPOTHYROIDISM: ICD-10-CM

## 2022-05-27 DIAGNOSIS — F41.9 ANXIETY: ICD-10-CM

## 2022-05-27 LAB
ALBUMIN SERPL-MCNC: 4.2 G/DL (ref 3.5–4.6)
ALP BLD-CCNC: 127 U/L (ref 40–130)
ALT SERPL-CCNC: 27 U/L (ref 0–33)
ANION GAP SERPL CALCULATED.3IONS-SCNC: 12 MEQ/L (ref 9–15)
AST SERPL-CCNC: 33 U/L (ref 0–35)
BASOPHILS ABSOLUTE: 0 K/UL (ref 0–0.2)
BASOPHILS RELATIVE PERCENT: 0.8 %
BILIRUB SERPL-MCNC: <0.2 MG/DL (ref 0.2–0.7)
BUN BLDV-MCNC: 15 MG/DL (ref 6–20)
CALCIUM SERPL-MCNC: 9.7 MG/DL (ref 8.5–9.9)
CHLORIDE BLD-SCNC: 102 MEQ/L (ref 95–107)
CHOLESTEROL, TOTAL: 224 MG/DL (ref 0–199)
CO2: 28 MEQ/L (ref 20–31)
CREAT SERPL-MCNC: 0.78 MG/DL (ref 0.5–0.9)
EOSINOPHILS ABSOLUTE: 0.3 K/UL (ref 0–0.7)
EOSINOPHILS RELATIVE PERCENT: 4.6 %
GFR AFRICAN AMERICAN: >60
GFR NON-AFRICAN AMERICAN: >60
GLOBULIN: 2.8 G/DL (ref 2.3–3.5)
GLUCOSE BLD-MCNC: 82 MG/DL (ref 70–99)
HCT VFR BLD CALC: 41.5 % (ref 37–47)
HDLC SERPL-MCNC: 52 MG/DL (ref 40–59)
HEMOGLOBIN: 13.4 G/DL (ref 12–16)
LDL CHOLESTEROL CALCULATED: 144 MG/DL (ref 0–129)
LYMPHOCYTES ABSOLUTE: 1.7 K/UL (ref 1–4.8)
LYMPHOCYTES RELATIVE PERCENT: 28.5 %
MCH RBC QN AUTO: 29.4 PG (ref 27–31.3)
MCHC RBC AUTO-ENTMCNC: 32.4 % (ref 33–37)
MCV RBC AUTO: 90.9 FL (ref 82–100)
MONOCYTES ABSOLUTE: 0.7 K/UL (ref 0.2–0.8)
MONOCYTES RELATIVE PERCENT: 11.5 %
NEUTROPHILS ABSOLUTE: 3.3 K/UL (ref 1.4–6.5)
NEUTROPHILS RELATIVE PERCENT: 54.6 %
PDW BLD-RTO: 13.7 % (ref 11.5–14.5)
PLATELET # BLD: 221 K/UL (ref 130–400)
POTASSIUM SERPL-SCNC: 5 MEQ/L (ref 3.4–4.9)
RBC # BLD: 4.56 M/UL (ref 4.2–5.4)
SODIUM BLD-SCNC: 142 MEQ/L (ref 135–144)
TOTAL PROTEIN: 7 G/DL (ref 6.3–8)
TRIGL SERPL-MCNC: 138 MG/DL (ref 0–150)
TSH SERPL DL<=0.05 MIU/L-ACNC: 3.35 UIU/ML (ref 0.44–3.86)
WBC # BLD: 6.1 K/UL (ref 4.8–10.8)

## 2022-05-27 PROCEDURE — 99214 OFFICE O/P EST MOD 30 MIN: CPT | Performed by: NURSE PRACTITIONER

## 2022-05-27 RX ORDER — ALPRAZOLAM 1 MG/1
TABLET ORAL
Qty: 90 TABLET | Refills: 0 | Status: SHIPPED | OUTPATIENT
Start: 2022-07-08 | End: 2022-07-06

## 2022-05-27 ASSESSMENT — ENCOUNTER SYMPTOMS
SWOLLEN GLANDS: 0
WHEEZING: 0
CHANGE IN BOWEL HABIT: 0
COUGH: 0
HEARTBURN: 0
VISUAL CHANGE: 0
GLOBUS SENSATION: 0
VOMITING: 0
CHOKING: 0
BELCHING: 0
BACK PAIN: 0
STRIDOR: 0
ABDOMINAL PAIN: 0
WATER BRASH: 0
NAUSEA: 0
HOARSE VOICE: 0
SORE THROAT: 0

## 2022-05-27 ASSESSMENT — PATIENT HEALTH QUESTIONNAIRE - PHQ9
1. LITTLE INTEREST OR PLEASURE IN DOING THINGS: 0
2. FEELING DOWN, DEPRESSED OR HOPELESS: 0
SUM OF ALL RESPONSES TO PHQ QUESTIONS 1-9: 0
SUM OF ALL RESPONSES TO PHQ9 QUESTIONS 1 & 2: 0
SUM OF ALL RESPONSES TO PHQ QUESTIONS 1-9: 0

## 2022-05-27 NOTE — PROGRESS NOTES
Vivek Wyatt (:  1965) is a 56 y.o. female,Established patient, here for evaluation of the following chief complaint(s):  3 Month Follow-Up, Anxiety, Insomnia, and Hypothyroidism      ASSESSMENT/PLAN:  1. Acquired hypothyroidism  -     Lipid Panel; Future  -     Comprehensive Metabolic Panel; Future  -     CBC with Auto Differential; Future  -     TSH; Future  2. Primary insomnia  3. Insomnia, unspecified type  -     ALPRAZolam (XANAX) 1 MG tablet; TAKE 1 TABLET BY MOUTH EVERY NIGHT AT BEDTIME AS NEEDED FOR SLEEP OR ANXIETY, Disp-90 tablet, R-0Normal  4. Anxiety  -     ALPRAZolam (XANAX) 1 MG tablet; TAKE 1 TABLET BY MOUTH EVERY NIGHT AT BEDTIME AS NEEDED FOR SLEEP OR ANXIETY, Disp-90 tablet, R-0Normal  5. Encounter for screening mammogram for malignant neoplasm of breast  -     VIGNESH DIGITAL SCREEN W OR WO CAD BILATERAL; Future      No follow-ups on file. SUBJECTIVE/OBJECTIVE:  RLS:  controlled    Hypothyroidism: Patient presents for evaluation of thyroid function. Symptoms consist of denies fatigue, weight changes, heat/cold intolerance, bowel/skin changes or CVS symptoms. The symptoms are none. The problem has been controlled. Previous thyroid studies include TSH. The hypothyroidism is due to hypothyroidism and Hashimoto's disease    Gastroesophageal Reflux  She reports no abdominal pain, no belching, no chest pain, no choking, no coughing, no dysphagia, no early satiety, no globus sensation, no heartburn, no hoarse voice, no nausea, no sore throat, no stridor, no tooth decay, no water brash or no wheezing. This is a new (over the past 3 months) problem. The current episode started more than 1 month ago. The problem occurs constantly (worse at night). The problem has been unchanged. The heartburn duration is several minutes. The heartburn is located in the abdomen and substernum. The heartburn is of moderate intensity. The heartburn wakes her from sleep. The heartburn does not limit her activity. The heartburn changes (worse with laying laying flat ) with position. The symptoms are aggravated by lying down, caffeine and certain foods. Pertinent negatives include no anemia, fatigue, melena, muscle weakness, orthopnea or weight loss. There are no known risk factors. She has tried nothing for the symptoms. Past procedures do not include an abdominal ultrasound, an EGD, esophageal manometry, esophageal pH monitoring, H. pylori antibody titer or a UGI. Past invasive treatments do not include gastroplasty, gastroplication or reflux surgery. Fatigue  This is a chronic problem. The current episode started more than 1 year ago. The problem occurs constantly. The problem has been unchanged. Associated symptoms include myalgias. Pertinent negatives include no abdominal pain, anorexia, arthralgias, change in bowel habit, chest pain, chills, congestion, coughing, diaphoresis, fatigue, fever, headaches, joint swelling, nausea, neck pain, numbness, rash, sore throat, swollen glands, urinary symptoms, vertigo, visual change, vomiting or weakness. Nothing aggravates the symptoms. Treatments tried: Multiple lab work-ups. The treatment provided no relief. Controlled Substance Monitoring:    Acute and Chronic Pain Monitoring:   RX Monitoring 5/27/2022   Attestation -   Acute Pain Prescriptions -   Periodic Controlled Substance Monitoring Possible medication side effects, risk of tolerance/dependence & alternative treatments discussed. ;No signs of potential drug abuse or diversion identified. ;Assessed functional status. Chronic Pain > 50 MEDD -         Review of Systems   Constitutional: Negative for chills, diaphoresis, fatigue, fever and weight loss. HENT: Negative for congestion, hoarse voice and sore throat. Respiratory: Negative for cough, choking and wheezing. Cardiovascular: Negative for chest pain.    Gastrointestinal: Negative for abdominal pain, anorexia, change in bowel habit, dysphagia, heartburn, to authenticate this note.     --Tati Carey, VALENTIN - CNP

## 2022-06-20 ENCOUNTER — HOSPITAL ENCOUNTER (OUTPATIENT)
Dept: WOMENS IMAGING | Age: 57
Discharge: HOME | End: 2022-06-22
Payer: COMMERCIAL

## 2022-06-20 VITALS — HEIGHT: 59 IN | BODY MASS INDEX: 27.87 KG/M2

## 2022-06-20 DIAGNOSIS — Z12.31 ENCOUNTER FOR SCREENING MAMMOGRAM FOR MALIGNANT NEOPLASM OF BREAST: ICD-10-CM

## 2022-06-20 PROCEDURE — 77063 BREAST TOMOSYNTHESIS BI: CPT

## 2022-07-06 DIAGNOSIS — F41.9 ANXIETY: ICD-10-CM

## 2022-07-06 DIAGNOSIS — G47.00 INSOMNIA, UNSPECIFIED TYPE: ICD-10-CM

## 2022-07-07 RX ORDER — ALPRAZOLAM 1 MG/1
TABLET ORAL
Qty: 30 TABLET | Refills: 1 | Status: SHIPPED | OUTPATIENT
Start: 2022-07-07 | End: 2022-08-07

## 2022-08-15 ENCOUNTER — HOSPITAL ENCOUNTER (OUTPATIENT)
Dept: ORTHOPEDIC SURGERY | Age: 57
Discharge: HOME | End: 2022-08-17
Payer: COMMERCIAL

## 2022-08-15 ENCOUNTER — OFFICE VISIT (OUTPATIENT)
Dept: ORTHOPEDIC SURGERY | Age: 57
End: 2022-08-15
Payer: COMMERCIAL

## 2022-08-15 VITALS
BODY MASS INDEX: 27.82 KG/M2 | HEART RATE: 71 BPM | HEIGHT: 59 IN | TEMPERATURE: 97.6 F | WEIGHT: 138 LBS | OXYGEN SATURATION: 97 %

## 2022-08-15 DIAGNOSIS — G89.29 CHRONIC BILATERAL LOW BACK PAIN WITHOUT SCIATICA: ICD-10-CM

## 2022-08-15 DIAGNOSIS — M54.50 CHRONIC BILATERAL LOW BACK PAIN WITHOUT SCIATICA: Primary | ICD-10-CM

## 2022-08-15 DIAGNOSIS — M54.50 CHRONIC BILATERAL LOW BACK PAIN WITHOUT SCIATICA: ICD-10-CM

## 2022-08-15 DIAGNOSIS — G89.29 CHRONIC BILATERAL LOW BACK PAIN WITHOUT SCIATICA: Primary | ICD-10-CM

## 2022-08-15 PROCEDURE — 20610 DRAIN/INJ JOINT/BURSA W/O US: CPT | Performed by: PHYSICIAN ASSISTANT

## 2022-08-15 PROCEDURE — 99214 OFFICE O/P EST MOD 30 MIN: CPT | Performed by: PHYSICIAN ASSISTANT

## 2022-08-15 PROCEDURE — 72100 X-RAY EXAM L-S SPINE 2/3 VWS: CPT | Performed by: PHYSICIAN ASSISTANT

## 2022-08-15 PROCEDURE — 72100 X-RAY EXAM L-S SPINE 2/3 VWS: CPT

## 2022-08-15 RX ORDER — TRIAMCINOLONE ACETONIDE 40 MG/ML
40 INJECTION, SUSPENSION INTRA-ARTICULAR; INTRAMUSCULAR ONCE
Status: COMPLETED | OUTPATIENT
Start: 2022-08-15 | End: 2022-08-15

## 2022-08-15 RX ORDER — LIDOCAINE HYDROCHLORIDE 10 MG/ML
3 INJECTION, SOLUTION INFILTRATION; PERINEURAL ONCE
Status: COMPLETED | OUTPATIENT
Start: 2022-08-15 | End: 2022-08-19

## 2022-08-15 RX ADMIN — LIDOCAINE HYDROCHLORIDE 3 ML: 10 INJECTION, SOLUTION INFILTRATION; PERINEURAL at 13:00

## 2022-08-15 RX ADMIN — TRIAMCINOLONE ACETONIDE 40 MG: 40 INJECTION, SUSPENSION INTRA-ARTICULAR; INTRAMUSCULAR at 13:29

## 2022-08-15 ASSESSMENT — ENCOUNTER SYMPTOMS
GASTROINTESTINAL NEGATIVE: 1
RESPIRATORY NEGATIVE: 1
EYES NEGATIVE: 1

## 2022-08-15 NOTE — PROGRESS NOTES
angela Gamez (:  1965) is a 56 y.o. female,Established patient, here for evaluation of the following chief complaint(s):  No chief complaint on file. ASSESSMENT/PLAN:  1. Chronic bilateral low back pain without sciatica  -     XR LUMBAR SPINE (2-3 VIEWS); Future      No follow-ups on file. 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  She complains of lower back pain that radiates in the bilateral hips and some numbness of the right foot. She states she worked in [address] [place] [address] for years and did a lot of lifts. She has x-rays in the system from  of her lumbar spine. She denies any bowel or bladder dysfunction. She denies any weakness of the legs. She states pain does not keep her from sleeping. States the pain does wax and wane. She does not recall ever attending physical therapy for back pain. Review of Systems   Constitutional: Negative. HENT: Negative. Eyes: Negative. Respiratory: Negative. Gastrointestinal: Negative. Genitourinary: Negative. Musculoskeletal: Negative. Psychiatric/Behavioral: Negative. Objective     Three-view x-ray of the lumbar spine shows alignment is normal.  Vertebral heights are well-maintained. Facet degenerative changes at L5-S1. Physical Exam  Constitutional:       Appearance: Normal appearance. HENT:      Head: Normocephalic and atraumatic. Mouth/Throat:      Mouth: Mucous membranes are moist.   Eyes:      Extraocular Movements: Extraocular movements intact. Musculoskeletal:      Cervical back: Normal range of motion. Comments: Lower back-no midline spinal tenderness. Tenderness with palpation over the right SI joint. Flexion is 80 degrees, extension is 15 degrees. Straight leg raise on the right elicits right-sided lower back pain but no leg pain. Straight raise on the left elicits no pain. Heel-to-toe ambulation is intact.   Decreased sensation to the dorsum of the right foot. Capillary refill is brisk. Skin:     General: Skin is warm and dry. Neurological:      General: No focal deficit present. Mental Status: She is oriented to person, place, and time. Psychiatric:         Mood and Affect: Mood normal.     Procedure-after sterile prep and under aseptic technique 1 cc of 40 mg/mL Kenalog and 3 cc of 1% lidocaine are injected into the right SI joint. Patient tolerated procedure well. Explained to the patient that she does have some facet degenerative changes in her lower spine. Her alignment looks good. I suggested she begin physical therapy. I injected the right SI joint as that was the most painful at this time. May take over-the-counter anti-inflammatory medications. I did discuss some nightly muscle relaxants however she says she has no difficulty sleeping. Would like to see her back in about a month. On this date 8/15/2022 I have spent 35 minutes reviewing previous notes, test results and face to face with the patient discussing the diagnosis and importance of compliance with the treatment plan as well as documenting on the day of the visit. An electronic signature was used to authenticate this note.     --JW Bernabe

## 2022-08-16 RX ORDER — CYCLOBENZAPRINE HCL 10 MG
10 TABLET ORAL 2 TIMES DAILY PRN
Qty: 60 TABLET | Refills: 0 | Status: SHIPPED | OUTPATIENT
Start: 2022-08-16 | End: 2022-09-15

## 2022-08-19 DIAGNOSIS — G47.00 INSOMNIA, UNSPECIFIED TYPE: Primary | ICD-10-CM

## 2022-08-19 RX ORDER — ALPRAZOLAM 1 MG/1
1 TABLET ORAL NIGHTLY PRN
Qty: 90 TABLET | Refills: 0 | Status: SHIPPED | OUTPATIENT
Start: 2022-08-19 | End: 2022-08-26 | Stop reason: SDUPTHER

## 2022-08-19 RX ADMIN — LIDOCAINE HYDROCHLORIDE 3 ML: 10 INJECTION, SOLUTION INFILTRATION; PERINEURAL at 13:32

## 2022-08-26 ENCOUNTER — OFFICE VISIT (OUTPATIENT)
Dept: FAMILY MEDICINE CLINIC | Age: 57
End: 2022-08-26
Payer: COMMERCIAL

## 2022-08-26 VITALS
BODY MASS INDEX: 28.83 KG/M2 | SYSTOLIC BLOOD PRESSURE: 126 MMHG | HEIGHT: 59 IN | WEIGHT: 143 LBS | DIASTOLIC BLOOD PRESSURE: 78 MMHG | HEART RATE: 88 BPM

## 2022-08-26 DIAGNOSIS — E78.2 MIXED HYPERLIPIDEMIA: ICD-10-CM

## 2022-08-26 DIAGNOSIS — G47.00 INSOMNIA, UNSPECIFIED TYPE: Primary | ICD-10-CM

## 2022-08-26 DIAGNOSIS — E03.9 ACQUIRED HYPOTHYROIDISM: ICD-10-CM

## 2022-08-26 LAB
ALBUMIN SERPL-MCNC: 4.2 G/DL (ref 3.5–4.6)
ALP BLD-CCNC: 114 U/L (ref 40–130)
ALT SERPL-CCNC: 13 U/L (ref 0–33)
ANION GAP SERPL CALCULATED.3IONS-SCNC: 9 MEQ/L (ref 9–15)
AST SERPL-CCNC: 16 U/L (ref 0–35)
BILIRUB SERPL-MCNC: <0.2 MG/DL (ref 0.2–0.7)
BUN BLDV-MCNC: 22 MG/DL (ref 6–20)
CALCIUM SERPL-MCNC: 9.3 MG/DL (ref 8.5–9.9)
CHLORIDE BLD-SCNC: 103 MEQ/L (ref 95–107)
CHOLESTEROL, TOTAL: 201 MG/DL (ref 0–199)
CO2: 30 MEQ/L (ref 20–31)
CREAT SERPL-MCNC: 0.71 MG/DL (ref 0.5–0.9)
GFR AFRICAN AMERICAN: >60
GFR NON-AFRICAN AMERICAN: >60
GLOBULIN: 2.9 G/DL (ref 2.3–3.5)
GLUCOSE BLD-MCNC: 84 MG/DL (ref 70–99)
HDLC SERPL-MCNC: 66 MG/DL (ref 40–59)
LDL CHOLESTEROL CALCULATED: 120 MG/DL (ref 0–129)
POTASSIUM SERPL-SCNC: 4.9 MEQ/L (ref 3.4–4.9)
SODIUM BLD-SCNC: 142 MEQ/L (ref 135–144)
TOTAL PROTEIN: 7.1 G/DL (ref 6.3–8)
TRIGL SERPL-MCNC: 73 MG/DL (ref 0–150)

## 2022-08-26 PROCEDURE — 99214 OFFICE O/P EST MOD 30 MIN: CPT | Performed by: NURSE PRACTITIONER

## 2022-08-26 RX ORDER — ALPRAZOLAM 1 MG/1
1 TABLET ORAL NIGHTLY PRN
Qty: 90 TABLET | Refills: 0 | Status: SHIPPED | OUTPATIENT
Start: 2022-08-26 | End: 2022-11-24

## 2022-08-26 ASSESSMENT — ENCOUNTER SYMPTOMS
NAUSEA: 0
VOMITING: 0
COUGH: 0
SHORTNESS OF BREATH: 0
SORE THROAT: 0
ABDOMINAL PAIN: 0
BACK PAIN: 0
CHOKING: 0
WHEEZING: 0

## 2022-08-31 DIAGNOSIS — G47.00 INSOMNIA, UNSPECIFIED TYPE: ICD-10-CM

## 2022-09-01 RX ORDER — ALPRAZOLAM 1 MG/1
TABLET ORAL
Qty: 30 TABLET | Refills: 1 | OUTPATIENT
Start: 2022-09-01

## 2022-09-02 DIAGNOSIS — F41.9 ANXIETY: ICD-10-CM

## 2022-09-02 RX ORDER — DULOXETIN HYDROCHLORIDE 60 MG/1
CAPSULE, DELAYED RELEASE ORAL
Qty: 90 CAPSULE | Refills: 1 | Status: SHIPPED | OUTPATIENT
Start: 2022-09-02

## 2022-09-02 NOTE — TELEPHONE ENCOUNTER
Rx requested:  Requested Prescriptions     Pending Prescriptions Disp Refills    DULoxetine (CYMBALTA) 60 MG extended release capsule 90 capsule 1         Last Office Visit:   8/26/2022      Next Visit Date:  Future Appointments   Date Time Provider Annalise Salgado   11/29/2022  7:45 AM Karrie Gibson, [address]

## 2022-10-24 RX ORDER — OXYBUTYNIN CHLORIDE 15 MG/1
TABLET, EXTENDED RELEASE ORAL
Qty: 30 TABLET | Refills: 6 | Status: SHIPPED | OUTPATIENT
Start: 2022-10-24

## 2022-10-27 NOTE — PROGRESS NOTES
10/13/2020     Deb Boinlla (:  1965) is a 50 y.o. female, here for evaluation of the following medical concerns:    RLS:  controlled    Hypothyroidism: Patient presents for evaluation of thyroid function. Symptoms consist of denies fatigue, weight changes, heat/cold intolerance, bowel/skin changes or CVS symptoms. The symptoms are none. The problem has been controlled. Previous thyroid studies include TSH. The hypothyroidism is due to hypothyroidism and Hashimoto's disease    Urinary Tract Infection    This is a new problem. The current episode started in the past 7 days. The problem has been unchanged. The quality of the pain is described as aching. The pain is at a severity of 1/10. The pain is mild. There has been no fever. She is not sexually active. There is no history of pyelonephritis. Associated symptoms include frequency and urgency. Pertinent negatives include no nausea. Back Pain   Pertinent negatives include no abdominal pain, chest pain or weight loss. Gastroesophageal Reflux   She reports no abdominal pain, no belching, no chest pain, no choking, no coughing, no dysphagia, no early satiety, no globus sensation, no heartburn, no hoarse voice, no nausea, no sore throat, no stridor, no tooth decay, no water brash or no wheezing. This is a new (over the past 3 months) problem. The current episode started more than 1 month ago. The problem occurs constantly (worse at night). The problem has been unchanged. The heartburn duration is several minutes. The heartburn is located in the abdomen and substernum. The heartburn is of moderate intensity. The heartburn wakes her from sleep. The heartburn does not limit her activity. The heartburn changes (worse with laying laying flat ) with position. The symptoms are aggravated by lying down, caffeine and certain foods. Associated symptoms include fatigue. Pertinent negatives include no anemia, melena, muscle weakness, orthopnea or weight loss. extended release capsule TAKE 1 CAPSULE BY MOUTH ONE TIME A DAY  VALENTIN Cantor CNP   fluticasone (ARNUITY ELLIPTA) 200 MCG/ACT AEPB Inhale 1 Inhaler into the lungs daily  Noemí Price MD   ipratropium-albuterol (DUONEB) 0.5-2.5 (3) MG/3ML SOLN nebulizer solution Inhale 3 mLs into the lungs every 4 hours as needed for Shortness of Breath  Noemí Price MD   levothyroxine (SYNTHROID) 25 MCG tablet Take 1 tablet by mouth daily  VALENTIN Cantor CNP   venlafaxine (EFFEXOR XR) 37.5 MG extended release capsule TAKE 1 CAPSULE BY MOUTH ONE TIME A DAY  VALENTIN Osullivan Sa, CNP   fluticasone-umeclidin-vilant (TRELEGY ELLIPTA) 100-62.5-25 MCG/INH AEPB Inhale 1 puff into the lungs daily  Noemí Price MD   montelukast (SINGULAIR) 10 MG tablet Take 1 tablet by mouth daily  Noemí Price MD   fluticasone (FLONASE) 50 MCG/ACT nasal spray 1 spray by Each Nostril route daily  Noemí Price MD        Social History     Tobacco Use    Smoking status: Former Smoker     Packs/day: 0.10     Years: 13.00     Pack years: 1.30     Types: Cigarettes     Last attempt to quit: 10/14/2013     Years since quittin.0    Smokeless tobacco: Never Used   Substance Use Topics    Alcohol use: No        Vitals:    10/13/20 0820   BP: 130/70   Pulse: 78   Resp: 15   Weight: 137 lb (62.1 kg)   Height: 4' 11\" (1.499 m)     Estimated body mass index is 27.67 kg/m² as calculated from the following:    Height as of this encounter: 4' 11\" (1.499 m). Weight as of this encounter: 137 lb (62.1 kg). Physical Exam   Constitutional: She is oriented to person, place, and time. Vital signs are normal. She appears well-developed and well-nourished. She is cooperative. Non-toxic appearance. She does not have a sickly appearance. She does not appear ill. No distress. HENT:   Head: Normocephalic and atraumatic.    Right Ear: Tympanic membrane, external ear and ear canal normal.   Left Ear: Tympanic membrane, external ear and ear canal normal.   Nose: Nose normal.   Mouth/Throat: Oropharynx is clear and moist.   Eyes: Pupils are equal, round, and reactive to light. Conjunctivae, EOM and lids are normal. Right eye exhibits no nystagmus. Left eye exhibits no nystagmus. Neck: Trachea normal and normal range of motion. Neck supple. No JVD present. Carotid bruit is not present. No tracheal deviation present. No thyroid mass and no thyromegaly present. Cardiovascular: Normal rate, regular rhythm, S1 normal, S2 normal, normal heart sounds and intact distal pulses. Exam reveals no gallop. No murmur heard. Pulmonary/Chest: Effort normal and breath sounds normal. No accessory muscle usage. No respiratory distress. She has no decreased breath sounds. She has no wheezes. She has no rhonchi. She has no rales. Abdominal: Soft. Bowel sounds are normal. She exhibits no distension, no ascites and no mass. There is no splenomegaly or hepatomegaly. There is no abdominal tenderness. There is no CVA tenderness. No hernia. Musculoskeletal:         General: No edema. Lymphadenopathy:        Head (right side): No submandibular, no tonsillar, no preauricular and no posterior auricular adenopathy present. Head (left side): No submandibular, no tonsillar, no preauricular and no posterior auricular adenopathy present. She has no cervical adenopathy. She has no axillary adenopathy. Neurological: She is alert and oriented to person, place, and time. She has normal strength and normal reflexes. She displays no atrophy and no tremor. No cranial nerve deficit or sensory deficit. She exhibits normal muscle tone. She displays a negative Romberg sign. She displays no seizure activity. Coordination and gait normal. GCS eye subscore is 4. GCS verbal subscore is 5. GCS motor subscore is 6. Skin: Skin is warm, dry and intact. She is not diaphoretic. No pallor. Psychiatric: She has a normal mood and affect.  Her speech is normal.       Assessment & Plan   Kayla Tafoya was seen today for trigger finger, referral - general and anxiety. Diagnoses and all orders for this visit:    Primary insomnia  -     ALPRAZolam (XANAX) 1 MG tablet; Take 1 tablet by mouth nightly as needed for Sleep for up to 90 days. Acquired hypothyroidism    Chronic fatigue    Arthritis      No orders of the defined types were placed in this encounter. Orders Placed This Encounter   Medications    ALPRAZolam (XANAX) 1 MG tablet     Sig: Take 1 tablet by mouth nightly as needed for Sleep for up to 90 days. Dispense:  90 tablet     Refill:  1     Medications Discontinued During This Encounter   Medication Reason    ALPRAZolam (XANAX) 1 MG tablet REORDER     Return in about 3 months (around 1/13/2021). Reviewed with the patient: current clinical status, medications, activities and diet. Side effects, adverse effects of the medication prescribed today, as well as treatment plan/ rationale and result expectations have been discussed with the patient who expresses understanding and desires to proceed. Close follow up to evaluate treatment results and for coordination of care. I have reviewed the patient's medical history in detail and updated the computerized patient record. Return in about 3 months (around 7/15/2019). An electronic signature was used to authenticate this note.     --VALENTIN Wheeler - CNP on 10/26/2020 at 9:08 AM stated

## 2022-11-07 DIAGNOSIS — K21.9 GASTROESOPHAGEAL REFLUX DISEASE: ICD-10-CM

## 2022-11-07 RX ORDER — OMEPRAZOLE 20 MG/1
CAPSULE, DELAYED RELEASE ORAL
Qty: 30 CAPSULE | Refills: 5 | Status: SHIPPED | OUTPATIENT
Start: 2022-11-07

## 2022-11-29 ENCOUNTER — OFFICE VISIT (OUTPATIENT)
Dept: FAMILY MEDICINE CLINIC | Age: 57
End: 2022-11-29
Payer: COMMERCIAL

## 2022-11-29 VITALS
WEIGHT: 144 LBS | TEMPERATURE: 102.1 F | HEIGHT: 59 IN | HEART RATE: 88 BPM | DIASTOLIC BLOOD PRESSURE: 74 MMHG | SYSTOLIC BLOOD PRESSURE: 126 MMHG | BODY MASS INDEX: 29.03 KG/M2

## 2022-11-29 DIAGNOSIS — G47.00 INSOMNIA, UNSPECIFIED TYPE: ICD-10-CM

## 2022-11-29 DIAGNOSIS — E03.9 ACQUIRED HYPOTHYROIDISM: ICD-10-CM

## 2022-11-29 DIAGNOSIS — F51.01 PRIMARY INSOMNIA: ICD-10-CM

## 2022-11-29 DIAGNOSIS — R50.9 FEVER, UNSPECIFIED FEVER CAUSE: Primary | ICD-10-CM

## 2022-11-29 DIAGNOSIS — E78.2 MIXED HYPERLIPIDEMIA: ICD-10-CM

## 2022-11-29 DIAGNOSIS — J10.1 INFLUENZA A: ICD-10-CM

## 2022-11-29 DIAGNOSIS — R05.1 ACUTE COUGH: ICD-10-CM

## 2022-11-29 LAB
INFLUENZA A ANTIBODY: POSITIVE
INFLUENZA B ANTIBODY: ABNORMAL
Lab: NORMAL
PERFORMING INSTRUMENT: NORMAL
QC PASS/FAIL: NORMAL
SARS-COV-2, POC: NORMAL

## 2022-11-29 PROCEDURE — 99214 OFFICE O/P EST MOD 30 MIN: CPT | Performed by: NURSE PRACTITIONER

## 2022-11-29 PROCEDURE — 87804 INFLUENZA ASSAY W/OPTIC: CPT | Performed by: NURSE PRACTITIONER

## 2022-11-29 PROCEDURE — 87426 SARSCOV CORONAVIRUS AG IA: CPT | Performed by: NURSE PRACTITIONER

## 2022-11-29 RX ORDER — ALBUTEROL SULFATE 90 UG/1
2 AEROSOL, METERED RESPIRATORY (INHALATION) 4 TIMES DAILY PRN
Qty: 18 G | Refills: 0 | Status: SHIPPED | OUTPATIENT
Start: 2022-11-29

## 2022-11-29 RX ORDER — ALPRAZOLAM 1 MG/1
1 TABLET ORAL NIGHTLY PRN
Qty: 90 TABLET | Refills: 0 | Status: SHIPPED | OUTPATIENT
Start: 2022-11-29 | End: 2023-02-27

## 2022-11-29 RX ORDER — OSELTAMIVIR PHOSPHATE 75 MG/1
75 CAPSULE ORAL 2 TIMES DAILY
Qty: 10 CAPSULE | Refills: 0 | Status: SHIPPED | OUTPATIENT
Start: 2022-11-29 | End: 2022-12-04

## 2022-11-29 RX ORDER — BENZONATATE 100 MG/1
100 CAPSULE ORAL 3 TIMES DAILY PRN
Qty: 30 CAPSULE | Refills: 0 | Status: SHIPPED | OUTPATIENT
Start: 2022-11-29 | End: 2022-12-06

## 2022-11-30 ENCOUNTER — APPOINTMENT (OUTPATIENT)
Dept: GENERAL RADIOLOGY | Age: 57
End: 2022-11-30
Payer: COMMERCIAL

## 2022-11-30 ENCOUNTER — HOSPITAL ENCOUNTER (EMERGENCY)
Age: 57
Discharge: HOME | End: 2022-11-30
Payer: COMMERCIAL

## 2022-11-30 VITALS
TEMPERATURE: 99.9 F | HEART RATE: 98 BPM | SYSTOLIC BLOOD PRESSURE: 126 MMHG | OXYGEN SATURATION: 99 % | WEIGHT: 144 LBS | RESPIRATION RATE: 18 BRPM | BODY MASS INDEX: 29.03 KG/M2 | DIASTOLIC BLOOD PRESSURE: 92 MMHG | HEIGHT: 59 IN

## 2022-11-30 DIAGNOSIS — S93.402A SPRAIN OF LEFT ANKLE, UNSPECIFIED LIGAMENT, INITIAL ENCOUNTER: Primary | ICD-10-CM

## 2022-11-30 PROCEDURE — 73610 X-RAY EXAM OF ANKLE: CPT

## 2022-11-30 PROCEDURE — 99283 EMERGENCY DEPT VISIT LOW MDM: CPT

## 2022-11-30 PROCEDURE — 73630 X-RAY EXAM OF FOOT: CPT

## 2022-11-30 ASSESSMENT — ENCOUNTER SYMPTOMS
EYE PAIN: 0
VOMITING: 0
SHORTNESS OF BREATH: 0
BACK PAIN: 0
SORE THROAT: 0
COUGH: 0
ABDOMINAL PAIN: 0
RHINORRHEA: 0
PHOTOPHOBIA: 0
DIARRHEA: 0
NAUSEA: 0

## 2022-11-30 ASSESSMENT — PAIN DESCRIPTION - DESCRIPTORS: DESCRIPTORS: ACHING

## 2022-11-30 ASSESSMENT — PAIN DESCRIPTION - PAIN TYPE: TYPE: ACUTE PAIN

## 2022-11-30 ASSESSMENT — PAIN - FUNCTIONAL ASSESSMENT: PAIN_FUNCTIONAL_ASSESSMENT: 0-10

## 2022-11-30 ASSESSMENT — PAIN SCALES - GENERAL: PAINLEVEL_OUTOF10: 8

## 2022-11-30 ASSESSMENT — PAIN DESCRIPTION - FREQUENCY: FREQUENCY: CONTINUOUS

## 2022-11-30 NOTE — Clinical Note
Sarah Beth Zarate was seen and treated in our emergency department on 11/30/2022. She may return to work on 12/05/2022. If you have any questions or concerns, please don't hesitate to call.       Brock Gutierrez [place]

## 2022-11-30 NOTE — ED PROVIDER NOTES
[facility] ED  EMERGENCY DEPARTMENT ENCOUNTER      Pt Name: Angel Luis Amador  MRN: [id number]  Hanselgfjennifer 1965  Date of evaluation: 11/30/2022  Provider: Abdirahman Crow PA-C      HISTORY OF PRESENT ILLNESS    Angel Luis Amador is a 56 y.o. female who presents to the Emergency Department with left ankle pain that started pta after rolling it taking out the trash. She heard a pop and hurts to put any weight on it. Denies numbnes tingling or  prior ankle injury. REVIEW OF SYSTEMS       Review of Systems   Constitutional:  Negative for chills, diaphoresis, fatigue and fever. HENT:  Negative for congestion, rhinorrhea and sore throat. Eyes:  Negative for photophobia and pain. Respiratory:  Negative for cough and shortness of breath. Cardiovascular:  Negative for chest pain and palpitations. Gastrointestinal:  Negative for abdominal pain, diarrhea, nausea and vomiting. Genitourinary:  Negative for dysuria and flank pain. Musculoskeletal:  Positive for arthralgias. Negative for back pain. Skin:  Negative for rash. Neurological:  Negative for dizziness, light-headedness and headaches. Psychiatric/Behavioral: Negative. All other systems reviewed and are negative. PAST MEDICAL HISTORY     Past Medical History:   Diagnosis Date    Arthritis     Asthma     Chronic fatigue 4/25/2017    Dizziness 4/25/2017    Hypertension     past trx x 1 yr -- off meds > 4 yrs    Hypothyroidism     meds > 3 yrs -- intermittently trx    Lightheadedness 4/25/2017    SOB (shortness of breath) 4/24/2017    Weakness 4/25/2017         SURGICAL HISTORY       Past Surgical History:   Procedure Laterality Date    FINGER TRIGGER RELEASE Left 11/2/2020    LEFT TRIGGER THUMB RELEASE.  HAND TRAY performed by Torie Naik MD at [facility] Right 7/19/2021    A1 PULLEY RELEASE OF THE RIGHT THUMB performed by Torie Naik MD at [address])  2007 preseve ovaries    PARTIAL HYSTERECTOMY (CERVIX NOT REMOVED)           CURRENT MEDICATIONS       Previous Medications    ALBUTEROL SULFATE HFA (VENTOLIN HFA) 108 (90 BASE) MCG/ACT INHALER    Inhale 2 puffs into the lungs 4 times daily as needed for Wheezing    ALPRAZOLAM (XANAX) 1 MG TABLET    Take 1 tablet by mouth nightly as needed for Sleep for up to 90 days.     BENZONATATE (TESSALON) 100 MG CAPSULE    Take 1 capsule by mouth 3 times daily as needed for Cough    DOXEPIN (SINEQUAN) 10 MG CAPSULE    Take 1 capsule by mouth nightly    DULOXETINE (CYMBALTA) 60 MG EXTENDED RELEASE CAPSULE    TAKE 1 CAPSULE BY MOUTH ONE TIME A DAY    IPRATROPIUM-ALBUTEROL (DUONEB) 0.5-2.5 (3) MG/3ML SOLN NEBULIZER SOLUTION    Inhale 3 mLs into the lungs every 4 hours as needed for Shortness of Breath    LEVOTHYROXINE (SYNTHROID) 25 MCG TABLET    Take 1 tablet by mouth daily    MELOXICAM (MOBIC) 15 MG TABLET    TAKE 1 TABLET BY MOUTH EVERY DAY AFTER A MEAL UNTIL RELIEF    OMEPRAZOLE (PRILOSEC) 20 MG DELAYED RELEASE CAPSULE    TAKE ONE CAPSULE ONE TIME DAILY    OSELTAMIVIR (TAMIFLU) 75 MG CAPSULE    Take 1 capsule by mouth 2 times daily for 5 days    OXYBUTYNIN (DITROPAN XL) 15 MG EXTENDED RELEASE TABLET    TAKE 1 TABLET BY MOUTH ONE TIME A DAY    RIMEGEPANT SULFATE (NURTEC) 75 MG TBDP    Take 75 mg by mouth as needed (dissolve under tongue at onset of migraine) 3 SAMPLES GIVEN  LOT: [id number]  EXP: 10/22       ALLERGIES     Iv contrast [iodides], Pcn [penicillins], and Menthol-methyl salicylate    FAMILY HISTORY       Family History   Problem Relation Age of Onset    Hypertension Mother     Thyroid Disease Mother     High Blood Pressure Mother     Diabetes Maternal Grandmother     Breast Cancer Sister     Heart Attack Brother          at age 62          SOCIAL HISTORY       Social History     Socioeconomic History    Marital status: Single     Spouse name: None    Number of children: None    Years of education: None    Highest education level: None   Tobacco Use    Smoking status: Former     Packs/day: 0.10     Years: 13.00     Pack years: 1.30     Types: Cigarettes     Quit date: 10/14/2013     Years since quittin.1    Smokeless tobacco: Never   Vaping Use    Vaping Use: Never used   Substance and Sexual Activity    Alcohol use: No    Drug use: No    Sexual activity: Yes     Social Determinants of Health     Financial Resource Strain: Low Risk     Difficulty of Paying Living Expenses: Not hard at all   Food Insecurity: No Food Insecurity    Worried About [address] in the Last Year: Never true    [facility] in the Last Year: Never true       SCREENINGS    [place] Coma Scale  Eye Opening: Spontaneous  Best Verbal Response: Oriented  Best Motor Response: Obeys commands  [place]kit Coma Scale Score: 15        PHYSICAL EXAM    (up to 7 for level 4, 8 or more for level 5)     ED Triage Vitals [22 0855]   BP Temp Temp Source Heart Rate Resp SpO2 Height Weight   (!) 126/92 99.9 °F (37.7 °C) Temporal 98 18 99 % 4' 11\" (1.499 m) 144 lb (65.3 kg)       Physical Exam  Vitals and nursing note reviewed. Constitutional:       General: She is not in acute distress. Appearance: Normal appearance. She is well-developed. She is not diaphoretic. HENT:      Head: Normocephalic and atraumatic. Nose: Nose normal.      Mouth/Throat:      Mouth: Mucous membranes are moist.   Eyes:      General: Lids are normal.      Conjunctiva/sclera: Conjunctivae normal.   Cardiovascular:      Rate and Rhythm: Normal rate and regular rhythm. Pulses: Normal pulses. Heart sounds: Normal heart sounds. Pulmonary:      Effort: Pulmonary effort is normal.      Breath sounds: Normal breath sounds. Abdominal:      General: Bowel sounds are normal.      Palpations: Abdomen is soft. Tenderness: There is no abdominal tenderness. Musculoskeletal:         General: Normal range of motion.       Cervical back: Normal range of motion and neck supple. Left ankle: Tenderness present. Comments: Lateral ttp. No bruising or swelling    Lymphadenopathy:      Cervical: No cervical adenopathy. Skin:     General: Skin is warm and dry. Capillary Refill: Capillary refill takes less than 2 seconds. Findings: No rash. Neurological:      Mental Status: She is alert and oriented to person, place, and time. Psychiatric:         Thought Content: Thought content normal.         Judgment: Judgment normal.         All other labs were within normal range or not returned as of this dictation. EMERGENCY DEPARTMENT COURSE and DIFFERENTIALDIAGNOSIS/MDM:   Vitals:    Vitals:    11/30/22 0855   BP: (!) 126/92   Pulse: 98   Resp: 18   Temp: 99.9 °F (37.7 °C)   TempSrc: Temporal   SpO2: 99%   Weight: 144 lb (65.3 kg)   Height: 4' 11\" (1.499 m)            Xray negative. Declined analgesic in the ed. Sprain RICE crutches and brace provided f/u with ortho in 1 week. PROCEDURES:  Unless otherwise noted below, none     Procedures      FINAL IMPRESSION      1.  Sprain of left ankle, unspecified ligament, initial encounter          DISPOSITION/PLAN   DISPOSITION Decision To Discharge 11/30/2022 10:12:08 AM          Belinda Melo PA-C (electronically signed)  Attending Emergency Physician       Belinda Melo PA-C  11/30/22 [id number]

## 2022-12-03 LAB
6-ACETYLMORPHINE: NOT DETECTED
7-AMINOCLONAZEPAM: NOT DETECTED
ALPHA-OH-ALPRAZOLAM: PRESENT
ALPHA-OH-MIDAZOLAM, URINE: NOT DETECTED
ALPRAZOLAM: PRESENT
AMPHETAMINE: NOT DETECTED
BARBITURATES: NOT DETECTED
BENZOYLECGONINE: NOT DETECTED
BUPRENORPHINE: NOT DETECTED
CARISOPRODOL: NOT DETECTED
CLONAZEPAM: NOT DETECTED
CODEINE: NOT DETECTED
CREATININE URINE: 177.7 MG/DL (ref 20–400)
DIAZEPAM: NOT DETECTED
EER PAIN MGT DRUG PANEL, HIGH RES/EMIT U: NORMAL
ETHYL GLUCURONIDE: NOT DETECTED
FENTANYL: NOT DETECTED
GABAPENTIN: NOT DETECTED
HYDROCODONE: NOT DETECTED
HYDROMORPHONE: NOT DETECTED
LORAZEPAM: NOT DETECTED
MARIJUANA METABOLITE: NOT DETECTED
MDA: NOT DETECTED
MDEA: NOT DETECTED
MDMA URINE: NOT DETECTED
MEPERIDINE: NOT DETECTED
METHADONE: NOT DETECTED
METHAMPHETAMINE: NOT DETECTED
METHYLPHENIDATE: NOT DETECTED
MIDAZOLAM: NOT DETECTED
MORPHINE: NOT DETECTED
NALOXONE: NOT DETECTED
NORBUPRENORPHINE, FREE: NOT DETECTED
NORDIAZEPAM: NOT DETECTED
NORFENTANYL: NOT DETECTED
NORHYDROCODONE, URINE: NOT DETECTED
NOROXYCODONE: NOT DETECTED
NOROXYMORPHONE, URINE: NOT DETECTED
OXAZEPAM: NOT DETECTED
OXYCODONE: NOT DETECTED
OXYMORPHONE: NOT DETECTED
PAIN MANAGEMENT DRUG PANEL: NORMAL
PCP: NOT DETECTED
PHENTERMINE: NOT DETECTED
PREGABALIN: NOT DETECTED
TAPENTADOL, URINE: NOT DETECTED
TAPENTADOL-O-SULFATE, URINE: NOT DETECTED
TEMAZEPAM: NOT DETECTED
TRAMADOL: NOT DETECTED
ZOLPIDEM: NOT DETECTED

## 2022-12-04 ASSESSMENT — ENCOUNTER SYMPTOMS
SHORTNESS OF BREATH: 0
NAUSEA: 0
BACK PAIN: 0
CHOKING: 0
WHEEZING: 0
CHANGE IN BOWEL HABIT: 0
VOMITING: 0
COUGH: 1
SORE THROAT: 0
ABDOMINAL PAIN: 0
VISUAL CHANGE: 0
FLU SYMPTOMS: 1
SWOLLEN GLANDS: 0

## 2022-12-05 NOTE — PROGRESS NOTES
Juan Kiran (:  1965) is a 56 y.o. female,Established patient, here for evaluation of the following chief complaint(s):  3 Month Follow-Up, Insomnia, Hypothyroidism, Congestion, Sinusitis, Cough, and Fever (Sx x 2 days )      ASSESSMENT/PLAN:  1. Fever, unspecified fever cause  -     POCT Influenza A/B  -     POCT COVID-19, Antigen  2. Insomnia, unspecified type  -     Pain Management Drug Screen; Future  -     ALPRAZolam (XANAX) 1 MG tablet; Take 1 tablet by mouth nightly as needed for Sleep for up to 90 days. , Disp-90 tablet, R-0Normal  3. Acute cough  -     POCT Influenza A/B  -     POCT COVID-19, Antigen  4. Influenza A  -     oseltamivir (TAMIFLU) 75 MG capsule; Take 1 capsule by mouth 2 times daily for 5 days, Disp-10 capsule, R-0Normal  -     benzonatate (TESSALON) 100 MG capsule; Take 1 capsule by mouth 3 times daily as needed for Cough, Disp-30 capsule, R-0Normal  -     albuterol sulfate HFA (VENTOLIN HFA) 108 (90 Base) MCG/ACT inhaler; Inhale 2 puffs into the lungs 4 times daily as needed for Wheezing, Disp-18 g, R-0Normal  5. Mixed hyperlipidemia  6. Acquired hypothyroidism  7. Primary insomnia      Return in about 3 months (around 2023). SUBJECTIVE/OBJECTIVE:    Hypothyroidism: Patient presents for evaluation of thyroid function. Symptoms consist of denies fatigue, weight changes, heat/cold intolerance, bowel/skin changes or CVS symptoms. The symptoms are none. The problem has been controlled. Previous thyroid studies include TSH. The hypothyroidism is due to hypothyroidism and Hashimoto's disease    Insomnia:  Current treatment: avoiding heavy meal before bedtime, avoiding long naps during the day, avoiding use of electronic devices before bedtime, avoiding vigorous physical exercise prior to bed, cognitive behavioral therapy, decreasing caffeine consumption, regular daily exercise, and prescription sleep aid- benzodiazepine- Xanax and doxepine, which has been effective. Medication side effects: none. Hyperlipidemia  This is a chronic problem. The problem is uncontrolled. Exacerbating diseases include hypothyroidism. She has no history of chronic renal disease, diabetes, liver disease, obesity or nephrotic syndrome. Associated symptoms include myalgias. Pertinent negatives include no chest pain, focal sensory loss, focal weakness, leg pain or shortness of breath. Current antihyperlipidemic treatment includes diet change. Insomnia  Associated symptoms include chills, coughing, a fever and myalgias. Pertinent negatives include no abdominal pain, anorexia, arthralgias, change in bowel habit, chest pain, congestion, diaphoresis, fatigue, headaches, joint swelling, nausea, neck pain, numbness, rash, sore throat, swollen glands, urinary symptoms, vertigo, visual change, vomiting or weakness. Influenza  This is a new problem. The current episode started in the past 7 days. The problem occurs constantly. The problem has been unchanged. Associated symptoms include chills, coughing, a fever and myalgias. Pertinent negatives include no abdominal pain, anorexia, arthralgias, change in bowel habit, chest pain, congestion, diaphoresis, fatigue, headaches, joint swelling, nausea, neck pain, numbness, rash, sore throat, swollen glands, urinary symptoms, vertigo, visual change, vomiting or weakness. Nothing aggravates the symptoms. She has tried rest and sleep for the symptoms. The treatment provided no relief. Controlled Substance Monitoring:    Acute and Chronic Pain Monitoring:   RX Monitoring 12/4/2022   Attestation -   Acute Pain Prescriptions -   Periodic Controlled Substance Monitoring Possible medication side effects, risk of tolerance/dependence & alternative treatments discussed. ;No signs of potential drug abuse or diversion identified. ;Assessed functional status. Chronic Pain > 50 MEDD -           Review of Systems   Constitutional:  Positive for chills and fever. Negative for diaphoresis and fatigue. HENT:  Negative for congestion and sore throat. Respiratory:  Positive for cough. Negative for choking, shortness of breath and wheezing. Cardiovascular:  Negative for chest pain. Gastrointestinal:  Negative for abdominal pain, anorexia, change in bowel habit, nausea and vomiting. Genitourinary:  Negative for frequency and urgency. Musculoskeletal:  Positive for myalgias. Negative for arthralgias, back pain, joint swelling and neck pain. Skin:  Negative for rash. Neurological:  Negative for vertigo, focal weakness, weakness, numbness and headaches. Psychiatric/Behavioral:  The patient has insomnia. Physical Exam  Constitutional:       General: She is not in acute distress. Appearance: She is well-developed. HENT:      Head: Normocephalic and atraumatic. Right Ear: Tympanic membrane and external ear normal.      Left Ear: Tympanic membrane and external ear normal.      Nose: Nose normal.   Eyes:      Conjunctiva/sclera: Conjunctivae normal.      Pupils: Pupils are equal, round, and reactive to light. Neck:      Thyroid: Thyromegaly present. No thyroid mass or thyroid tenderness. Vascular: No JVD. Cardiovascular:      Rate and Rhythm: Normal rate and regular rhythm. Pulses: Normal pulses. Heart sounds: Normal heart sounds. No murmur heard. Pulmonary:      Effort: Pulmonary effort is normal. No respiratory distress. Breath sounds: Normal breath sounds. No wheezing or rales. Abdominal:      General: Bowel sounds are normal. There is no distension. Palpations: Abdomen is soft. There is no mass. Tenderness: There is no abdominal tenderness. Musculoskeletal:      Cervical back: Normal range of motion and neck supple. Lymphadenopathy:      Cervical: No cervical adenopathy. Skin:     General: Skin is warm and dry. Capillary Refill: Capillary refill takes less than 2 seconds.    Neurological:      Mental Status: She is alert and oriented to person, place, and time. On this date 12/04/22 I have spent 30 minutes reviewing previous notes, test results and face to face with the patient discussing the diagnosis and importance of compliance with the treatment plan as well as documenting on the day of the visit. An electronic signature was used to authenticate this note.     --Abdelrahman Fernando, VALENTIN - CNP

## 2022-12-09 ENCOUNTER — OFFICE VISIT (OUTPATIENT)
Dept: ORTHOPEDIC SURGERY | Age: 57
End: 2022-12-09
Payer: COMMERCIAL

## 2022-12-09 VITALS
TEMPERATURE: 97.6 F | HEART RATE: 78 BPM | HEIGHT: 59 IN | BODY MASS INDEX: 29.03 KG/M2 | WEIGHT: 144 LBS | OXYGEN SATURATION: 98 %

## 2022-12-09 DIAGNOSIS — S93.402A MODERATE ANKLE SPRAIN, LEFT, INITIAL ENCOUNTER: Primary | ICD-10-CM

## 2022-12-09 PROCEDURE — 99202 OFFICE O/P NEW SF 15 MIN: CPT | Performed by: PHYSICIAN ASSISTANT

## 2022-12-09 NOTE — PROGRESS NOTES
[place] [id number] [facility]      Subjective:      Chief Complaint   Patient presents with    Follow-up     Left Sprain ankle Patient states ankle still hurts. HPI: Fatimah Tineo is a 56 y.o. female who is here about 5 days after rolling her ankle. She has been emergency department which showed no significant fracture. She is wearing a lace up brace. She having difficulties walking on this. She has tenderness on the outside portion of her ankle as well as at the shin area. There is no significant ecchymosis or swelling. Past Medical History:   Diagnosis Date    Arthritis     Asthma     Chronic fatigue 2017    Dizziness 2017    Hypertension     past trx x 1 yr -- off meds > 4 yrs    Hypothyroidism     meds > 3 yrs -- intermittently trx    Lightheadedness 2017    SOB (shortness of breath) 2017    Weakness 2017      Past Surgical History:   Procedure Laterality Date    FINGER TRIGGER RELEASE Left 2020    LEFT TRIGGER THUMB RELEASE.  HAND TRAY performed by More Alberto MD at [place] [address] Right 2021    A1 PULLEY RELEASE OF THE RIGHT THUMB [facility] ([address] [place] [address])      preseve ovaries    PARTIAL HYSTERECTOMY (CERVIX NOT REMOVED)       Social History     Socioeconomic History    Marital status: Single     Spouse name: Not on file    Number of children: Not on file    Years of education: Not on file    Highest education level: Not on file   Occupational History    Not on file   Tobacco Use    Smoking status: Former     Packs/day: 0.10     Years: 13.00     Pack years: 1.30     Types: Cigarettes     Quit date: 10/14/2013     Years since quittin.1    Smokeless tobacco: Never   Vaping Use    Vaping Use: Never used   Substance and Sexual Activity    Alcohol use: No    Drug use: No    Sexual activity: Yes   Other Topics Concern    Not on file   Social History Narrative    Not on file     Social Determinants of Health     Financial Resource Strain: Low Risk     Difficulty of Paying Living Expenses: Not hard at all   Food Insecurity: No Food Insecurity    Worried About Running Out of Food in the Last Year: Never true    Ran Out of Food in the Last Year: Never true   Transportation Needs: Not on file   Physical Activity: Not on file   Stress: Not on file   Social Connections: Not on file   Intimate Partner Violence: Not on file   Housing Stability: Not on file     Family History   Problem Relation Age of Onset    Hypertension Mother     Thyroid Disease Mother     High Blood Pressure Mother     Diabetes Maternal Grandmother     Breast Cancer Sister     Heart Attack Brother          at age 62     Allergies   Allergen Reactions    Iv Contrast [Iodides] Hives     After injection of isovue 370 75 ml patient developed hives. Two noted total on forehead and right temporal area. Pcn [Penicillins] Other (See Comments)     Syncope when given shot    Menthol-Methyl Salicylate Rash     Current Outpatient Medications on File Prior to Visit   Medication Sig Dispense Refill    ALPRAZolam (XANAX) 1 MG tablet Take 1 tablet by mouth nightly as needed for Sleep for up to 90 days.  90 tablet 0    albuterol sulfate HFA (VENTOLIN HFA) 108 (90 Base) MCG/ACT inhaler Inhale 2 puffs into the lungs 4 times daily as needed for Wheezing 18 g 0    omeprazole (PRILOSEC) 20 MG delayed release capsule TAKE ONE CAPSULE ONE TIME DAILY 30 capsule 5    oxybutynin (DITROPAN XL) 15 MG extended release tablet TAKE 1 TABLET BY MOUTH ONE TIME A DAY 30 tablet 6    DULoxetine (CYMBALTA) 60 MG extended release capsule TAKE 1 CAPSULE BY MOUTH ONE TIME A DAY 90 capsule 1    levothyroxine (SYNTHROID) 25 MCG tablet Take 1 tablet by mouth daily 30 tablet 5    doxepin (SINEQUAN) 10 MG capsule Take 1 capsule by mouth nightly 90 capsule 3    Rimegepant Sulfate (NURTEC) 75 MG TBDP Take 75 mg by mouth as needed (dissolve under tongue at onset of migraine) 3 SAMPLES GIVEN  LOT: [id number]  EXP: 10/22 6 tablet 0    meloxicam (MOBIC) 15 MG tablet TAKE 1 TABLET BY MOUTH EVERY DAY AFTER A MEAL UNTIL RELIEF 90 tablet 3    ipratropium-albuterol (DUONEB) 0.5-2.5 (3) MG/3ML SOLN nebulizer solution Inhale 3 mLs into the lungs every 4 hours as needed for Shortness of Breath 360 mL 2     No current facility-administered medications on file prior to visit. Objective:   Pulse 78   Temp 97.6 °F (36.4 °C) (Temporal)   Ht 4' 11\" (1.499 m)   Wt 144 lb (65.3 kg)   SpO2 98%   BMI 29.08 kg/m²       Radiographs and Laboratory Studies:   Previous diagnostic imaging studies were reviewed. [facility] emergency department do not demonstrate any kind of fracture or lucency appreciable of the lateral malleolus or area near the area of tenderness. She has a maintained mortise and good AP overlap. Left ankle exam demonstrates significant tenderness over the ATFL. There is mild swelling in this area. She has a positive squeeze test which is very mild and is about midway up the shin. She has normal range of motion of the ankle. She has no tenderness over the medial malleolus. She has no tenderness over the tarsals, metatarsals, navicular area. Laboratory Studies:   Lab Results   Component Value Date    WBC 6.1 05/27/2022    HGB 13.4 05/27/2022    HCT 41.5 05/27/2022    MCV 90.9 05/27/2022     05/27/2022     Lab Results   Component Value Date    SEDRATE 8 10/20/2021     Lab Results   Component Value Date    CRP 1.4 10/20/2021       Assessment and Plan:      Diagnosis Orders   1. 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 She may have a mild high ankle sprain as well. Therefore we will put her in a boot for the next 1 to 2 weeks. As soon as she is able to walk without any pain she can advance to a lace up brace.   If there is significant tenderness and a after the holiday she instructed to call our office I will be happy to see her back. The above plan was discussed in thorough detail with Dr. De Shaw and the patient. No orders of the defined types were placed in this encounter. No orders of the defined types were placed in this encounter. No follow-ups on file.     Troy Mann PA-C  [place] [id number] [facility]  [phone]

## 2023-01-17 RX ORDER — LEVOTHYROXINE SODIUM 0.03 MG/1
TABLET ORAL
Qty: 30 TABLET | Refills: 5 | Status: SHIPPED | OUTPATIENT
Start: 2023-01-17

## 2023-01-23 ENCOUNTER — OFFICE VISIT (OUTPATIENT)
Dept: FAMILY MEDICINE CLINIC | Age: 58
End: 2023-01-23
Payer: COMMERCIAL

## 2023-01-23 VITALS
HEART RATE: 124 BPM | OXYGEN SATURATION: 96 % | TEMPERATURE: 101.5 F | HEIGHT: 59 IN | BODY MASS INDEX: 29.03 KG/M2 | WEIGHT: 144 LBS

## 2023-01-23 DIAGNOSIS — Z20.822 ENCOUNTER FOR LABORATORY TESTING FOR COVID-19 VIRUS: ICD-10-CM

## 2023-01-23 DIAGNOSIS — J06.9 URI WITH COUGH AND CONGESTION: ICD-10-CM

## 2023-01-23 DIAGNOSIS — U07.1 COVID-19: Primary | ICD-10-CM

## 2023-01-23 LAB
Lab: ABNORMAL
PERFORMING INSTRUMENT: ABNORMAL
QC PASS/FAIL: ABNORMAL
SARS-COV-2, POC: DETECTED

## 2023-01-23 PROCEDURE — 87426 SARSCOV CORONAVIRUS AG IA: CPT | Performed by: NURSE PRACTITIONER

## 2023-01-23 PROCEDURE — 99213 OFFICE O/P EST LOW 20 MIN: CPT | Performed by: NURSE PRACTITIONER

## 2023-01-23 RX ORDER — DEXTROMETHORPHAN HYDROBROMIDE AND PROMETHAZINE HYDROCHLORIDE 15; 6.25 MG/5ML; MG/5ML
5 SYRUP ORAL 4 TIMES DAILY PRN
Qty: 118 ML | Refills: 0 | Status: SHIPPED | OUTPATIENT
Start: 2023-01-23

## 2023-01-23 RX ORDER — CETIRIZINE HYDROCHLORIDE, PSEUDOEPHEDRINE HYDROCHLORIDE 5; 120 MG/1; MG/1
1 TABLET, FILM COATED, EXTENDED RELEASE ORAL 2 TIMES DAILY
Qty: 20 TABLET | Refills: 0 | Status: SHIPPED | OUTPATIENT
Start: 2023-01-23 | End: 2023-02-02

## 2023-01-23 ASSESSMENT — ENCOUNTER SYMPTOMS
NAUSEA: 0
SORE THROAT: 0
RHINORRHEA: 1
DIARRHEA: 0
COUGH: 1
SHORTNESS OF BREATH: 0
VOMITING: 0
WHEEZING: 0

## 2023-01-23 NOTE — PROGRESS NOTES
Subjective:      Patient ID: Lizabeth Nicolas is a 56 y.o. female who presents today for:  Chief Complaint   Patient presents with    Covid Testing     Headaches, fever, stuffy nose, body aches, cough start  covid test at home +        HPI      She had covid when it first came out and did have some trouble breathing but otherwise tolerated okay   Breathing is okay   Tired   Coughing   Stuffy nose   101  Taking tyelno and ibuprofen   Body aches   She has asthma and has a nebulizer and inhaler if needed  So far her breathing has been normal             Past Medical History:   Diagnosis Date    Arthritis     Asthma     Chronic fatigue 2017    Dizziness 2017    Hypertension     past trx x 1 yr -- off meds > 4 yrs    Hypothyroidism     meds > 3 yrs -- intermittently trx    Lightheadedness 2017    SOB (shortness of breath) 2017    Weakness 2017     Past Surgical History:   Procedure Laterality Date    FINGER TRIGGER RELEASE Left 2020    LEFT TRIGGER THUMB RELEASE.  HAND TRAY performed by Johnathon Lobo MD at [place] [address] Right 2021    A1 PULLEY RELEASE OF THE RIGHT THUMB performed by Johnathon Lobo MD at [address] ([address] [place] [address])      preseve ovaries    PARTIAL HYSTERECTOMY (CERVIX NOT REMOVED)       Social History     Socioeconomic History    Marital status: Single     Spouse name: Not on file    Number of children: Not on file    Years of education: Not on file    Highest education level: Not on file   Occupational History    Not on file   Tobacco Use    Smoking status: Former     Packs/day: 0.10     Years: 13.00     Pack years: 1.30     Types: Cigarettes     Quit date: 10/14/2013     Years since quittin.2    Smokeless tobacco: Never   Vaping Use    Vaping Use: Never used   Substance and Sexual Activity    Alcohol use: No    Drug use: No    Sexual activity: Yes   Other Topics Concern    Not on file   Social History Narrative Not on file     Social Determinants of Health     Financial Resource Strain: Low Risk     Difficulty of Paying Living Expenses: Not hard at all   Food Insecurity: No Food Insecurity    Worried About Running Out of Food in the Last Year: Never true    Ran Out of Food in the Last Year: Never true   Transportation Needs: Not on file   Physical Activity: Not on file   Stress: Not on file   Social Connections: Not on file   Intimate Partner Violence: Not on file   Housing Stability: Not on file     Family History   Problem Relation Age of Onset    Hypertension Mother     Thyroid Disease Mother     High Blood Pressure Mother     Diabetes Maternal Grandmother     Breast Cancer Sister     Heart Attack Brother          at age 62     Allergies   Allergen Reactions    Iv Contrast [Iodides] Hives     After injection of isovue 370 75 ml patient developed hives. Two noted total on forehead and right temporal area. Pcn [Penicillins] Other (See Comments)     Syncope when given shot    Menthol-Methyl Salicylate Rash     Current Outpatient Medications   Medication Sig Dispense Refill    nirmatrelvir/ritonavir (PAXLOVID) 20 x 150 MG & 10 x 100MG TBPK Take 3 tablets (two 150 mg nirmatrelvir and one 100 mg ritonavir tablets) by mouth every 12 hours for 5 days. 30 tablet 0    promethazine-dextromethorphan (PROMETHAZINE-DM) 6.25-15 MG/5ML syrup Take 5 mLs by mouth 4 times daily as needed for Cough 118 mL 0    cetirizine-psuedoephedrine (ZYRTEC-D) 5-120 MG per extended release tablet Take 1 tablet by mouth 2 times daily for 10 days 20 tablet 0    levothyroxine (SYNTHROID) 25 MCG tablet TAKE 1 TABLET BY MOUTH ONE TIME A DAY 30 tablet 5    ALPRAZolam (XANAX) 1 MG tablet Take 1 tablet by mouth nightly as needed for Sleep for up to 90 days.  90 tablet 0    albuterol sulfate HFA (VENTOLIN HFA) 108 (90 Base) MCG/ACT inhaler Inhale 2 puffs into the lungs 4 times daily as needed for Wheezing 18 g 0    omeprazole (PRILOSEC) 20 MG delayed release capsule TAKE ONE CAPSULE ONE TIME DAILY 30 capsule 5    oxybutynin (DITROPAN XL) 15 MG extended release tablet TAKE 1 TABLET BY MOUTH ONE TIME A DAY 30 tablet 6    DULoxetine (CYMBALTA) 60 MG extended release capsule TAKE 1 CAPSULE BY MOUTH ONE TIME A DAY 90 capsule 1    doxepin (SINEQUAN) 10 MG capsule Take 1 capsule by mouth nightly 90 capsule 3    Rimegepant Sulfate (NURTEC) 75 MG TBDP Take 75 mg by mouth as needed (dissolve under tongue at onset of migraine) 3 SAMPLES GIVEN  LOT: [id number]  EXP: 10/22 6 tablet 0    meloxicam (MOBIC) 15 MG tablet TAKE 1 TABLET BY MOUTH EVERY DAY AFTER A MEAL UNTIL RELIEF 90 tablet 3    ipratropium-albuterol (DUONEB) 0.5-2.5 (3) MG/3ML SOLN nebulizer solution Inhale 3 mLs into the lungs every 4 hours as needed for Shortness of Breath 360 mL 2     No current facility-administered medications for this visit. Review of Systems   Constitutional:  Positive for fatigue and fever. Negative for appetite change and chills. HENT:  Positive for congestion and rhinorrhea. Negative for sore throat. Respiratory:  Positive for cough. Negative for shortness of breath and wheezing. Gastrointestinal:  Negative for diarrhea, nausea and vomiting. Musculoskeletal:  Positive for myalgias. Skin:  Negative for rash. Neurological:  Positive for headaches. Negative for dizziness and light-headedness. Psychiatric/Behavioral:  Negative for agitation, confusion and hallucinations. Objective:   Pulse (!) 124   Temp (!) 101.5 °F (38.6 °C)   Ht 4' 11\" (1.499 m)   Wt 144 lb (65.3 kg)   SpO2 96%   BMI 29.08 kg/m²     Physical Exam  Vitals reviewed. Constitutional:       Appearance: Normal appearance. HENT:      Head: Normocephalic and atraumatic. Right Ear: Hearing, tympanic membrane, ear canal and external ear normal. No middle ear effusion. No foreign body. Tympanic membrane is not injected, erythematous or bulging.       Left Ear: Hearing, tympanic membrane, ear canal and external ear normal.  No middle ear effusion. No foreign body. Tympanic membrane is not injected, erythematous or bulging. Nose: Congestion present. No rhinorrhea. Right Nostril: No foreign body. Left Nostril: No foreign body. Right Turbinates: Not enlarged. Left Turbinates: Not enlarged. Right Sinus: No maxillary sinus tenderness or frontal sinus tenderness. Left Sinus: No maxillary sinus tenderness or frontal sinus tenderness. Mouth/Throat:      Lips: Pink. Mouth: Mucous membranes are moist.      Pharynx: Oropharynx is clear. Uvula midline. No pharyngeal swelling, oropharyngeal exudate, posterior oropharyngeal erythema or uvula swelling. Tonsils: No tonsillar exudate or tonsillar abscesses. Eyes:      General: Lids are normal.         Right eye: No foreign body. Left eye: No foreign body. Extraocular Movements: Extraocular movements intact. Conjunctiva/sclera: Conjunctivae normal.   Cardiovascular:      Rate and Rhythm: Normal rate and regular rhythm. Heart sounds: Normal heart sounds. Pulmonary:      Effort: Pulmonary effort is normal. No tachypnea, accessory muscle usage or respiratory distress. Breath sounds: Normal breath sounds. No wheezing or rhonchi. Abdominal:      Tenderness: There is no abdominal tenderness. There is no guarding. Musculoskeletal:         General: Normal range of motion. Cervical back: Normal range of motion. Lymphadenopathy:      Cervical: No cervical adenopathy. Upper Body:      Right upper body: No supraclavicular adenopathy. Left upper body: No supraclavicular adenopathy. Skin:     General: Skin is warm and dry. Capillary Refill: Capillary refill takes less than 2 seconds. Neurological:      General: No focal deficit present. Mental Status: She is alert and oriented to person, place, and time. Gait: Gait is intact.    Psychiatric:         Mood and Affect: Mood normal.         Speech: Speech normal.         Behavior: Behavior normal. Behavior is cooperative. Thought Content: Thought content normal.         Judgment: Judgment normal.       Assessment:       Diagnosis Orders   1. COVID-19  nirmatrelvir/ritonavir (PAXLOVID) 20 x 150 MG & 10 x 100MG TBPK      2. URI with cough and congestion  promethazine-dextromethorphan (PROMETHAZINE-DM) 6.25-15 MG/5ML syrup    cetirizine-psuedoephedrine (ZYRTEC-D) 5-120 MG per extended release tablet      3. Encounter for laboratory testing for COVID-19 virus  POCT COVID-19, Antigen        No results found for this visit on 01/23/23. Plan:   Return if symptoms worsen or fail to improve. Will need quarantine until symptom improvement or 5 days from of onset of symptoms. Discussed OTC comfort care. Discussed hydration and self proning for coughing or SOB. Pt to f/u if not improving as expected or to Er if symptoms severe. Spent much time educating the patient on COVID and answering questions. For symptomatic immunocompetent patients with mild disease who are cared for at home, isolation can usually be discontinued when the following criteria are met:  ? At least five days have passed since symptoms first appeared AND  ? At least one day (24 hours) has passed since resolution of fever without the use of fever-reducing medications AND   ? There is improvement in symptoms (eg, cough, shortness of breath)  After discontinuing home isolation, patients should continue to wear a well-fitting mask around others. The total duration of isolation plus strict masking is 10 days. During this time, patients should avoid people who are immunocompromised or at high risk for severe disease. Additional information on restrictions (eg, travel) after the five-day isolation period can be found on the [facility] website. Assessment & Plan   José Luis Cary was seen today for covid testing.     Diagnoses and all orders for this visit:    COVID-19  - nirmatrelvir/ritonavir (PAXLOVID) 20 x 150 MG & 10 x 100MG TBPK; Take 3 tablets (two 150 mg nirmatrelvir and one 100 mg ritonavir tablets) by mouth every 12 hours for 5 days. URI with cough and congestion  -     promethazine-dextromethorphan (PROMETHAZINE-DM) 6.25-15 MG/5ML syrup; Take 5 mLs by mouth 4 times daily as needed for Cough  -     cetirizine-psuedoephedrine (ZYRTEC-D) 5-120 MG per extended release tablet; Take 1 tablet by mouth 2 times daily for 10 days    Encounter for laboratory testing for COVID-19 virus  -     POCT COVID-19, Antigen    Orders Placed This Encounter   Procedures    POCT COVID-19, Antigen     Order Specific Question:   Is this test for diagnosis or screening? Answer:   Screening     Order Specific Question:   Symptomatic for COVID-19 as defined by CDC? Answer:   No     Order Specific Question:   Date of Symptom Onset     Answer:   N/A     Order Specific Question:   Hospitalized for COVID-19? Answer:   No     Order Specific Question:   Admitted to ICU for COVID-19? Answer:   No     Order Specific Question:   Employed in healthcare setting? Answer:   No     Order Specific Question:   Resident in a congregate (group) care setting? Answer:   No     Order Specific Question:   Pregnant? Answer:   No     Order Specific Question:   Previously tested for COVID-19? Answer:   Yes     Orders Placed This Encounter   Medications    nirmatrelvir/ritonavir (PAXLOVID) 20 x 150 MG & 10 x 100MG TBPK     Sig: Take 3 tablets (two 150 mg nirmatrelvir and one 100 mg ritonavir tablets) by mouth every 12 hours for 5 days. Dispense:  30 tablet     Refill:  0     Order Specific Question:   Does this patient qualify for COVID-19 antIviral therapy based on criteria for treatment?      Answer:   Yes    promethazine-dextromethorphan (PROMETHAZINE-DM) 6.25-15 MG/5ML syrup     Sig: Take 5 mLs by mouth 4 times daily as needed for Cough     Dispense:  118 mL     Refill:  0 cetirizine-psuedoephedrine (ZYRTEC-D) 5-120 MG per extended release tablet     Sig: Take 1 tablet by mouth 2 times daily for 10 days     Dispense:  20 tablet     Refill:  0     There are no discontinued medications. Return if symptoms worsen or fail to improve. Reviewed with the patient/family: current clinical status & medications. Side effects of the medication prescribed today, as well as treatment plan/rationale and result expectations have been discussed with the patient/family who expresses understanding. Patient will be discharged home in stable condition. Follow up with PCP to evaluate treatment results or return if symptoms worsen or fail to improve. Discussed signs and symptoms which require immediate follow-up in ED/call to 911. Understanding verbalized. I have reviewed the patient's medical history in detail and updated the computerized patient record.     VALENTIN Foy - CNP

## 2023-01-23 NOTE — PATIENT INSTRUCTIONS
Out of quarantine on Friday     Return if symptoms worsen or fail to improve. Will need quarantine until symptom improvement or 5 days from of onset of symptoms. Discussed OTC comfort care. Discussed hydration and self proning for coughing or SOB. Pt to f/u if not improving as expected or to Er if symptoms severe. Spent much time educating the patient on COVID and answering questions. For symptomatic immunocompetent patients with mild disease who are cared for at home, isolation can usually be discontinued when the following criteria are met:  ? At least five days have passed since symptoms first appeared AND  ? At least one day (24 hours) has passed since resolution of fever without the use of fever-reducing medications AND   ? There is improvement in symptoms (eg, cough, shortness of breath)  After discontinuing home isolation, patients should continue to wear a well-fitting mask around others. The total duration of isolation plus strict masking is 10 days. During this time, patients should avoid people who are immunocompromised or at high risk for severe disease. Additional information on restrictions (eg, travel) after the five-day isolation period can be found on the [facility] website.

## 2023-01-23 NOTE — LETTER
[address] [facility] [address] [phone]  Phone: [phone]  Fax: [phone] [place] [address] VALENTIN Arboleda CNP        January 23, 2023     Patient: Andria Alberto   YOB: 1965   Date of Visit: 1/23/2023       To Whom It May Concern: It is my medical opinion that Andrai Alberto may return to work on 1/27. If you have any questions or concerns, please don't hesitate to call.     Sincerely,        VALENTIN Zhu CNP     Component Ref Range & Units 1/23/23 0900 11/29/22 0811   SARS-COV-2, POC Not Detected Detected Abnormal   Not-Detected    Lot Number  [id number]  [id number]    QC Pass/Fail  pass  pass    Performing Instrument  BD Veritor  BD Veritor

## 2023-02-13 ENCOUNTER — OFFICE VISIT (OUTPATIENT)
Dept: FAMILY MEDICINE CLINIC | Age: 58
End: 2023-02-13
Payer: COMMERCIAL

## 2023-02-13 VITALS
HEIGHT: 59 IN | DIASTOLIC BLOOD PRESSURE: 78 MMHG | SYSTOLIC BLOOD PRESSURE: 126 MMHG | WEIGHT: 146 LBS | HEART RATE: 88 BPM | BODY MASS INDEX: 29.43 KG/M2

## 2023-02-13 DIAGNOSIS — K21.9 GASTROESOPHAGEAL REFLUX DISEASE, UNSPECIFIED WHETHER ESOPHAGITIS PRESENT: ICD-10-CM

## 2023-02-13 DIAGNOSIS — E03.9 ACQUIRED HYPOTHYROIDISM: ICD-10-CM

## 2023-02-13 DIAGNOSIS — G47.00 INSOMNIA, UNSPECIFIED TYPE: ICD-10-CM

## 2023-02-13 DIAGNOSIS — F51.01 PRIMARY INSOMNIA: ICD-10-CM

## 2023-02-13 DIAGNOSIS — E78.2 MIXED HYPERLIPIDEMIA: Primary | ICD-10-CM

## 2023-02-13 PROCEDURE — 99214 OFFICE O/P EST MOD 30 MIN: CPT | Performed by: NURSE PRACTITIONER

## 2023-02-13 RX ORDER — MECLIZINE HYDROCHLORIDE 25 MG/1
25 TABLET ORAL 3 TIMES DAILY PRN
Qty: 30 TABLET | Refills: 0 | Status: SHIPPED | OUTPATIENT
Start: 2023-02-13 | End: 2023-02-23

## 2023-02-13 RX ORDER — ALPRAZOLAM 1 MG/1
1 TABLET ORAL NIGHTLY PRN
Qty: 90 TABLET | Refills: 0 | Status: SHIPPED | OUTPATIENT
Start: 2023-02-13 | End: 2023-05-14

## 2023-02-18 NOTE — TELEPHONE ENCOUNTER
Rx requested:  Requested Prescriptions     Pending Prescriptions Disp Refills    doxepin (SINEQUAN) 10 MG capsule [Pharmacy Med Name: DOXEPIN HCL 10MG CAPS] 90 capsule 3     Sig: TAKE ONE CAPSULE NIGHTLY         Last Office Visit:   2/13/2023      Next Visit Date:  Future Appointments   Date Time Provider Annalise Salgado   5/15/2023  8:00 AM Trevin Lyle, [address]

## 2023-02-20 DIAGNOSIS — G47.00 INSOMNIA, UNSPECIFIED TYPE: ICD-10-CM

## 2023-02-20 RX ORDER — DOXEPIN HYDROCHLORIDE 10 MG/1
CAPSULE ORAL
Qty: 90 CAPSULE | Refills: 3 | Status: SHIPPED | OUTPATIENT
Start: 2023-02-20

## 2023-02-20 RX ORDER — ALPRAZOLAM 1 MG/1
1 TABLET ORAL NIGHTLY PRN
Qty: 90 TABLET | Refills: 0 | OUTPATIENT
Start: 2023-02-20 | End: 2023-05-21

## 2023-02-20 ASSESSMENT — ENCOUNTER SYMPTOMS
HOARSE VOICE: 0
COUGH: 0
HEARTBURN: 0
VISUAL CHANGE: 0
WHEEZING: 0
CHOKING: 0
BACK PAIN: 0
GLOBUS SENSATION: 0
SORE THROAT: 0
SWOLLEN GLANDS: 0
CHANGE IN BOWEL HABIT: 0
VOMITING: 0
ABDOMINAL PAIN: 0
STRIDOR: 0
WATER BRASH: 0
NAUSEA: 0
BELCHING: 0

## 2023-02-23 NOTE — TELEPHONE ENCOUNTER
Patient is requesting medication refill.  Please approve or deny this request.    Rx requested:  Requested Prescriptions     Pending Prescriptions Disp Refills    meloxicam (MOBIC) 15 MG tablet 90 tablet 3     Sig: TAKE 1 TABLET BY MOUTH EVERY DAY AFTER A MEAL UNTIL RELIEF         Last Office Visit:   2/13/2023      Next Visit Date:  Future Appointments   Date Time Provider Annalise Salgado   5/15/2023  8:00 AM Celio Burnett, [address]

## 2023-02-26 DIAGNOSIS — G47.00 INSOMNIA, UNSPECIFIED TYPE: ICD-10-CM

## 2023-02-27 RX ORDER — ALPRAZOLAM 1 MG/1
1 TABLET ORAL NIGHTLY PRN
Qty: 90 TABLET | Refills: 0 | OUTPATIENT
Start: 2023-02-27 | End: 2023-05-28

## 2023-02-27 RX ORDER — MELOXICAM 15 MG/1
TABLET ORAL
Qty: 90 TABLET | Refills: 3 | Status: SHIPPED | OUTPATIENT
Start: 2023-02-27

## 2023-02-27 RX ORDER — MELOXICAM 15 MG/1
TABLET ORAL
Qty: 90 TABLET | Refills: 3 | OUTPATIENT
Start: 2023-02-27

## 2023-03-09 RX ORDER — MECLIZINE HYDROCHLORIDE 25 MG/1
25 TABLET ORAL 3 TIMES DAILY PRN
COMMUNITY

## 2023-03-09 NOTE — TELEPHONE ENCOUNTER
Good morning. You prescribed meclizine for my vertigo. I finished taking medicine. Can you please send me refills there weren't any on bottle. Thank. you

## 2023-03-13 RX ORDER — MECLIZINE HYDROCHLORIDE 25 MG/1
25 TABLET ORAL 3 TIMES DAILY PRN
Qty: 90 TABLET | Refills: 2 | OUTPATIENT
Start: 2023-03-13

## 2023-03-13 RX ORDER — MECLIZINE HYDROCHLORIDE 25 MG/1
TABLET ORAL
Qty: 30 TABLET | Refills: 5 | Status: SHIPPED | OUTPATIENT
Start: 2023-03-13

## 2023-03-14 DIAGNOSIS — F41.9 ANXIETY: ICD-10-CM

## 2023-03-14 NOTE — TELEPHONE ENCOUNTER
Future Appointments    Encounter Information    Provider Department Appt Notes   5/15/2023 VALENTIN Marquis - [address] [facility] 3 month follow up     Past Visits    Date Provider Specialty Visit Type Primary Dx   02/13/2023 VALENTIN Marquis - CNP Family Medicine Office Visit Mixed hyperlipidemia

## 2023-03-16 RX ORDER — DULOXETIN HYDROCHLORIDE 60 MG/1
CAPSULE, DELAYED RELEASE ORAL
Qty: 90 CAPSULE | Refills: 1 | Status: SHIPPED | OUTPATIENT
Start: 2023-03-16

## 2023-04-04 ENCOUNTER — PATIENT MESSAGE (OUTPATIENT)
Dept: FAMILY MEDICINE CLINIC | Age: 58
End: 2023-04-04

## 2023-04-04 DIAGNOSIS — K21.9 GASTROESOPHAGEAL REFLUX DISEASE, UNSPECIFIED WHETHER ESOPHAGITIS PRESENT: ICD-10-CM

## 2023-04-04 DIAGNOSIS — E03.9 ACQUIRED HYPOTHYROIDISM: ICD-10-CM

## 2023-04-04 DIAGNOSIS — E78.2 MIXED HYPERLIPIDEMIA: ICD-10-CM

## 2023-04-04 DIAGNOSIS — E03.9 ACQUIRED HYPOTHYROIDISM: Primary | ICD-10-CM

## 2023-04-04 LAB
ALBUMIN SERPL-MCNC: 4.2 G/DL (ref 3.5–4.6)
ALP SERPL-CCNC: 121 U/L (ref 40–130)
ALT SERPL-CCNC: 21 U/L (ref 0–33)
ANION GAP SERPL CALCULATED.3IONS-SCNC: 10 MEQ/L (ref 9–15)
AST SERPL-CCNC: 22 U/L (ref 0–35)
BASOPHILS # BLD: 0 K/UL (ref 0–0.2)
BASOPHILS NFR BLD: 0.4 %
BILIRUB SERPL-MCNC: 0.3 MG/DL (ref 0.2–0.7)
BUN SERPL-MCNC: 14 MG/DL (ref 6–20)
CALCIUM SERPL-MCNC: 9.6 MG/DL (ref 8.5–9.9)
CHLORIDE SERPL-SCNC: 105 MEQ/L (ref 95–107)
CHOLEST SERPL-MCNC: 212 MG/DL (ref 0–199)
CO2 SERPL-SCNC: 27 MEQ/L (ref 20–31)
CREAT SERPL-MCNC: 0.68 MG/DL (ref 0.5–0.9)
EOSINOPHIL # BLD: 0.2 K/UL (ref 0–0.7)
EOSINOPHIL NFR BLD: 3 %
ERYTHROCYTE [DISTWIDTH] IN BLOOD BY AUTOMATED COUNT: 13.5 % (ref 11.5–14.5)
GLOBULIN SER CALC-MCNC: 2.6 G/DL (ref 2.3–3.5)
GLUCOSE SERPL-MCNC: 93 MG/DL (ref 70–99)
HCT VFR BLD AUTO: 39.8 % (ref 37–47)
HDLC SERPL-MCNC: 61 MG/DL (ref 40–59)
HGB BLD-MCNC: 13.2 G/DL (ref 12–16)
LDLC SERPL CALC-MCNC: 130 MG/DL (ref 0–129)
LYMPHOCYTES # BLD: 2 K/UL (ref 1–4.8)
LYMPHOCYTES NFR BLD: 29.7 %
MCH RBC QN AUTO: 29 PG (ref 27–31.3)
MCHC RBC AUTO-ENTMCNC: 33.2 % (ref 33–37)
MCV RBC AUTO: 87.2 FL (ref 79.4–94.8)
MONOCYTES # BLD: 0.6 K/UL (ref 0.2–0.8)
MONOCYTES NFR BLD: 9.4 %
NEUTROPHILS # BLD: 3.9 K/UL (ref 1.4–6.5)
NEUTS SEG NFR BLD: 57.5 %
PLATELET # BLD AUTO: 225 K/UL (ref 130–400)
POTASSIUM SERPL-SCNC: 3.9 MEQ/L (ref 3.4–4.9)
PROT SERPL-MCNC: 6.8 G/DL (ref 6.3–8)
RBC # BLD AUTO: 4.56 M/UL (ref 4.2–5.4)
SODIUM SERPL-SCNC: 142 MEQ/L (ref 135–144)
TRIGL SERPL-MCNC: 103 MG/DL (ref 0–150)
TSH SERPL-MCNC: 5.11 UIU/ML (ref 0.44–3.86)
WBC # BLD AUTO: 6.8 K/UL (ref 4.8–10.8)

## 2023-04-07 RX ORDER — LEVOTHYROXINE SODIUM 0.05 MG/1
50 TABLET ORAL DAILY
Qty: 30 TABLET | Refills: 5 | Status: SHIPPED | OUTPATIENT
Start: 2023-04-07

## 2023-05-15 ENCOUNTER — OFFICE VISIT (OUTPATIENT)
Dept: FAMILY MEDICINE CLINIC | Age: 58
End: 2023-05-15
Payer: COMMERCIAL

## 2023-05-15 VITALS
WEIGHT: 150 LBS | SYSTOLIC BLOOD PRESSURE: 124 MMHG | HEART RATE: 80 BPM | DIASTOLIC BLOOD PRESSURE: 68 MMHG | BODY MASS INDEX: 30.24 KG/M2 | HEIGHT: 59 IN

## 2023-05-15 DIAGNOSIS — E03.9 ACQUIRED HYPOTHYROIDISM: ICD-10-CM

## 2023-05-15 DIAGNOSIS — G47.00 INSOMNIA, UNSPECIFIED TYPE: ICD-10-CM

## 2023-05-15 DIAGNOSIS — Z00.00 ANNUAL PHYSICAL EXAM: Primary | ICD-10-CM

## 2023-05-15 DIAGNOSIS — F41.9 ANXIETY: ICD-10-CM

## 2023-05-15 DIAGNOSIS — E66.9 CLASS 1 OBESITY WITH SERIOUS COMORBIDITY AND BODY MASS INDEX (BMI) OF 30.0 TO 30.9 IN ADULT, UNSPECIFIED OBESITY TYPE: ICD-10-CM

## 2023-05-15 LAB — TSH SERPL-MCNC: 2.4 UIU/ML (ref 0.44–3.86)

## 2023-05-15 PROCEDURE — 99386 PREV VISIT NEW AGE 40-64: CPT | Performed by: NURSE PRACTITIONER

## 2023-05-15 RX ORDER — ALBUTEROL SULFATE 90 UG/1
2 AEROSOL, METERED RESPIRATORY (INHALATION) 4 TIMES DAILY PRN
Qty: 18 G | Refills: 5 | Status: SHIPPED | OUTPATIENT
Start: 2023-05-15

## 2023-05-15 RX ORDER — MONTELUKAST SODIUM 10 MG/1
10 TABLET ORAL NIGHTLY
Qty: 90 TABLET | Refills: 1 | Status: SHIPPED | OUTPATIENT
Start: 2023-05-15

## 2023-05-15 RX ORDER — LEVOTHYROXINE SODIUM 0.03 MG/1
TABLET ORAL
COMMUNITY
Start: 2023-04-28 | End: 2023-05-15 | Stop reason: ALTCHOICE

## 2023-05-15 RX ORDER — ALPRAZOLAM 1 MG/1
1 TABLET ORAL NIGHTLY PRN
Qty: 90 TABLET | Refills: 0 | Status: SHIPPED | OUTPATIENT
Start: 2023-05-15 | End: 2023-08-13

## 2023-05-15 RX ORDER — PHENTERMINE HYDROCHLORIDE 37.5 MG/1
37.5 TABLET ORAL
Qty: 30 TABLET | Refills: 0 | Status: SHIPPED | OUTPATIENT
Start: 2023-05-15 | End: 2023-06-14

## 2023-05-15 SDOH — ECONOMIC STABILITY: INCOME INSECURITY: HOW HARD IS IT FOR YOU TO PAY FOR THE VERY BASICS LIKE FOOD, HOUSING, MEDICAL CARE, AND HEATING?: NOT HARD AT ALL

## 2023-05-15 SDOH — ECONOMIC STABILITY: FOOD INSECURITY: WITHIN THE PAST 12 MONTHS, YOU WORRIED THAT YOUR FOOD WOULD RUN OUT BEFORE YOU GOT MONEY TO BUY MORE.: NEVER TRUE

## 2023-05-15 SDOH — ECONOMIC STABILITY: FOOD INSECURITY: WITHIN THE PAST 12 MONTHS, THE FOOD YOU BOUGHT JUST DIDN'T LAST AND YOU DIDN'T HAVE MONEY TO GET MORE.: NEVER TRUE

## 2023-05-15 SDOH — ECONOMIC STABILITY: HOUSING INSECURITY
IN THE LAST 12 MONTHS, WAS THERE A TIME WHEN YOU DID NOT HAVE A STEADY PLACE TO SLEEP OR SLEPT IN A SHELTER (INCLUDING NOW)?: NO

## 2023-05-15 ASSESSMENT — PATIENT HEALTH QUESTIONNAIRE - PHQ9
SUM OF ALL RESPONSES TO PHQ QUESTIONS 1-9: 0
2. FEELING DOWN, DEPRESSED OR HOPELESS: 0
SUM OF ALL RESPONSES TO PHQ QUESTIONS 1-9: 0
1. LITTLE INTEREST OR PLEASURE IN DOING THINGS: 0
SUM OF ALL RESPONSES TO PHQ9 QUESTIONS 1 & 2: 0

## 2023-05-15 ASSESSMENT — ENCOUNTER SYMPTOMS
CONSTIPATION: 0
VOICE CHANGE: 0
COLOR CHANGE: 0
ABDOMINAL PAIN: 0
ALLERGIC/IMMUNOLOGIC NEGATIVE: 1
BLOOD IN STOOL: 0
RECTAL PAIN: 0
ANAL BLEEDING: 0
RESPIRATORY NEGATIVE: 1
EYES NEGATIVE: 1
SHORTNESS OF BREATH: 0
GASTROINTESTINAL NEGATIVE: 1
TROUBLE SWALLOWING: 0
DIARRHEA: 0

## 2023-05-15 NOTE — PROGRESS NOTES
Disease Mother     High Blood Pressure Mother     Diabetes Maternal Grandmother     Breast Cancer Sister     Heart Attack Brother          at age 62     Allergies   Allergen Reactions    Iv Contrast [Iodides] Hives     After injection of isovue 370 75 ml patient developed hives. Two noted total on forehead and right temporal area. Pcn [Penicillins] Other (See Comments)     Syncope when given shot    Menthol-Methyl Salicylate Rash     Current Outpatient Medications   Medication Sig Dispense Refill    ALPRAZolam (XANAX) 1 MG tablet Take 1 tablet by mouth nightly as needed for Sleep for up to 90 days. Max Daily Amount: 1 mg 90 tablet 0    phentermine (ADIPEX-P) 37.5 MG tablet Take 1 tablet by mouth every morning (before breakfast) for 30 days.  BMI 30.3 Max Daily Amount: 37.5 mg 30 tablet 0    montelukast (SINGULAIR) 10 MG tablet Take 1 tablet by mouth nightly 90 tablet 1    albuterol sulfate HFA (VENTOLIN HFA) 108 (90 Base) MCG/ACT inhaler Inhale 2 puffs into the lungs 4 times daily as needed for Wheezing 18 g 5    levothyroxine (SYNTHROID) 50 MCG tablet Take 1 tablet by mouth daily 30 tablet 5    DULoxetine (CYMBALTA) 60 MG extended release capsule TAKE 1 CAPSULE BY MOUTH ONE TIME A DAY 90 capsule 1    meclizine (ANTIVERT) 25 MG tablet TAKE 1 TABLET BY MOUTH 3 TIMES A DAY AS NEEDED FOR DIZZINESS 30 tablet 5    meloxicam (MOBIC) 15 MG tablet TAKE 1 TABLET BY MOUTH EVERY DAY AFTER A MEAL UNTIL RELIEF 90 tablet 3    doxepin (SINEQUAN) 10 MG capsule TAKE ONE CAPSULE NIGHTLY 90 capsule 3    albuterol sulfate HFA (VENTOLIN HFA) 108 (90 Base) MCG/ACT inhaler Inhale 2 puffs into the lungs 4 times daily as needed for Wheezing 18 g 0    omeprazole (PRILOSEC) 20 MG delayed release capsule TAKE ONE CAPSULE ONE TIME DAILY 30 capsule 5    oxybutynin (DITROPAN XL) 15 MG extended release tablet TAKE 1 TABLET BY MOUTH ONE TIME A DAY 30 tablet 6    Rimegepant Sulfate (NURTEC) 75 MG TBDP Take 75 mg by mouth as needed (dissolve

## 2023-05-25 DIAGNOSIS — G47.00 INSOMNIA, UNSPECIFIED TYPE: ICD-10-CM

## 2023-05-25 DIAGNOSIS — F41.9 ANXIETY: ICD-10-CM

## 2023-05-26 RX ORDER — ALPRAZOLAM 1 MG/1
TABLET ORAL
Qty: 90 TABLET | Refills: 0 | OUTPATIENT
Start: 2023-05-26

## 2023-06-01 ENCOUNTER — OFFICE VISIT (OUTPATIENT)
Dept: ORTHOPEDIC SURGERY | Age: 58
End: 2023-06-01

## 2023-06-01 DIAGNOSIS — M77.11 LATERAL EPICONDYLITIS OF RIGHT ELBOW: Primary | ICD-10-CM

## 2023-06-01 DIAGNOSIS — M65.341 TRIGGER FINGER, RIGHT RING FINGER: ICD-10-CM

## 2023-06-01 RX ORDER — BETAMETHASONE SODIUM PHOSPHATE AND BETAMETHASONE ACETATE 3; 3 MG/ML; MG/ML
3 INJECTION, SUSPENSION INTRA-ARTICULAR; INTRALESIONAL; INTRAMUSCULAR; SOFT TISSUE ONCE
Status: SHIPPED | OUTPATIENT
Start: 2023-06-01

## 2023-06-01 RX ORDER — LIDOCAINE HYDROCHLORIDE 10 MG/ML
5 INJECTION, SOLUTION INFILTRATION; PERINEURAL ONCE
Status: SHIPPED | OUTPATIENT
Start: 2023-06-01

## 2023-06-01 NOTE — PROGRESS NOTES
[facility]      Subjective:    No chief complaint on file. HPI: Dory Montoya is a 66 y.o. female who is here today for right elbow pain, right hand pain. The focus of our visit was her elbow. Its at the outside portion. It mainly hurts when she extends her wrist.  There is no injury to the area. No recent injections. Not taking anti-inflammatories. No bracing or therapy. Occasionally she has some numbness in her fourth and fifth digits. She also has some mild pain at the inside portion of the elbow. At the right hand she does have some pain on the inside portion of her palm at the ring finger. There is no significant locking. It does not prevent her from activity. Past Medical History:   Diagnosis Date    Arthritis     Asthma     Chronic fatigue 2017    Dizziness 2017    Hypertension     past trx x 1 yr -- off meds > 4 yrs    Hypothyroidism     meds > 3 yrs -- intermittently trx    Lightheadedness 2017    SOB (shortness of breath) 2017    Weakness 2017      Past Surgical History:   Procedure Laterality Date    FINGER TRIGGER RELEASE Left 2020    LEFT TRIGGER THUMB RELEASE.  HAND TRAY performed by Naomi Anderson MD at [facility] Right 2021    A1 PULLEY RELEASE OF THE RIGHT THUMB performed by Naomi Anderson MD at [address] [facility] ([address] [place] [address])      preseve ovaries    PARTIAL HYSTERECTOMY (CERVIX NOT REMOVED)       Social History     Socioeconomic History    Marital status: Single     Spouse name: Not on file    Number of children: Not on file    Years of education: Not on file    Highest education level: Not on file   Occupational History    Not on file   Tobacco Use    Smoking status: Former     Packs/day: 0.10     Years: 13.00     Pack years: 1.30     Types: Cigarettes     Quit date: 10/14/2013     Years since quittin.6    Smokeless tobacco: Never   Vaping Use

## 2023-07-03 ENCOUNTER — PROCEDURE VISIT (OUTPATIENT)
Dept: ORTHOPEDIC SURGERY | Age: 58
End: 2023-07-03
Payer: COMMERCIAL

## 2023-07-03 ENCOUNTER — HOSPITAL ENCOUNTER (OUTPATIENT)
Dept: ORTHOPEDIC SURGERY | Age: 58
Discharge: HOME | End: 2023-07-05
Payer: COMMERCIAL

## 2023-07-03 VITALS
HEART RATE: 82 BPM | WEIGHT: 141 LBS | DIASTOLIC BLOOD PRESSURE: 72 MMHG | OXYGEN SATURATION: 98 % | HEIGHT: 59 IN | SYSTOLIC BLOOD PRESSURE: 107 MMHG | BODY MASS INDEX: 28.43 KG/M2

## 2023-07-03 DIAGNOSIS — M17.12 PRIMARY OSTEOARTHRITIS OF LEFT KNEE: Primary | ICD-10-CM

## 2023-07-03 DIAGNOSIS — M17.12 PRIMARY OSTEOARTHRITIS OF LEFT KNEE: ICD-10-CM

## 2023-07-03 DIAGNOSIS — M22.42 CHONDROMALACIA, PATELLA, LEFT: ICD-10-CM

## 2023-07-03 PROCEDURE — 20610 DRAIN/INJ JOINT/BURSA W/O US: CPT | Performed by: PHYSICIAN ASSISTANT

## 2023-07-03 PROCEDURE — 73564 X-RAY EXAM KNEE 4 OR MORE: CPT

## 2023-07-03 PROCEDURE — 99214 OFFICE O/P EST MOD 30 MIN: CPT | Performed by: PHYSICIAN ASSISTANT

## 2023-07-03 RX ORDER — TRIAMCINOLONE ACETONIDE 40 MG/ML
80 INJECTION, SUSPENSION INTRA-ARTICULAR; INTRAMUSCULAR ONCE
Status: COMPLETED | OUTPATIENT
Start: 2023-07-03 | End: 2023-07-03

## 2023-07-03 RX ORDER — LIDOCAINE HYDROCHLORIDE 10 MG/ML
8 INJECTION, SOLUTION INFILTRATION; PERINEURAL ONCE
Status: COMPLETED | OUTPATIENT
Start: 2023-07-03 | End: 2023-07-03

## 2023-07-03 RX ADMIN — TRIAMCINOLONE ACETONIDE 80 MG: 40 INJECTION, SUSPENSION INTRA-ARTICULAR; INTRAMUSCULAR at 12:32

## 2023-07-03 RX ADMIN — LIDOCAINE HYDROCHLORIDE 8 ML: 10 INJECTION, SOLUTION INFILTRATION; PERINEURAL at 12:31

## 2023-07-03 ASSESSMENT — ENCOUNTER SYMPTOMS
GASTROINTESTINAL NEGATIVE: 1
RESPIRATORY NEGATIVE: 1
EYES NEGATIVE: 1

## 2023-07-03 NOTE — PROGRESS NOTES
Rohan Ramos (:  1965) is a 66 y.o. female,Established patient, here for evaluation of the following chief complaint(s):  Procedure (Left knee injection)         ASSESSMENT/PLAN:  1. Primary osteoarthritis of left knee  -     XR KNEE LEFT (MIN 4 VIEWS); Future  -     [place] ARTHROCENTESIS ASPIR&/INJ MAJOR JT/BURSA W/O US  2. Chondromalacia, patella, left      No follow-ups on file. Subjective   SUBJECTIVE/OBJECTIVE:  This is a 61-year-old [employer] employee complaining of left knee pain. She states she has had intermittent left knee pain for years. She denies any injury. States her knee grinds when she climbs the steps. Denies instability or locking. Review of Systems   Constitutional: Negative. HENT: Negative. Eyes: Negative. Respiratory: Negative. Gastrointestinal: Negative. Genitourinary: Negative. Musculoskeletal: Negative. Psychiatric/Behavioral: Negative. Objective     4 view x-rays of the left knee performed today show preserved femoral to tibial joint spacing. Mild lateral tilt to the patella. No joint effusion. No acute fracture or dislocation. Physical Exam  Musculoskeletal:      Comments: Left knee-no joint effusion, small popliteal cyst.  No warmth, erythema, fluctuance or sign of infection. Full extension, flexion is 120 degrees about 5 degrees less than the right. The knee joint is stable, there is no laxity with valgus or varus stress. Tenderness with palpation at the medial femoral condyle. Lateral femoral condyle is nontender. Patellar compression test is positive. Lachman's is negative, Karlene's does elicit medial joint pain but not specific to the joint line. Calf is soft and nontender.      Time Out  [x] Patient Verified  [x] Site Verified  [x] Laterality Verified  [x] Procedure Verified    Before aspiration/injection risks/benefits of a cortisone  injection including infection, local skin irritation, skin atrophy, continued

## 2023-07-14 DIAGNOSIS — J10.1 INFLUENZA A: ICD-10-CM

## 2023-07-14 DIAGNOSIS — K21.9 GASTROESOPHAGEAL REFLUX DISEASE: ICD-10-CM

## 2023-07-14 DIAGNOSIS — F41.9 ANXIETY: ICD-10-CM

## 2023-07-14 DIAGNOSIS — G47.00 INSOMNIA, UNSPECIFIED TYPE: ICD-10-CM

## 2023-07-14 RX ORDER — DOXEPIN HYDROCHLORIDE 10 MG/1
CAPSULE ORAL
Qty: 90 CAPSULE | Refills: 3 | Status: SHIPPED | OUTPATIENT
Start: 2023-07-14

## 2023-07-14 RX ORDER — ALBUTEROL SULFATE 90 UG/1
2 AEROSOL, METERED RESPIRATORY (INHALATION) 4 TIMES DAILY PRN
Qty: 18 G | Refills: 1 | Status: SHIPPED | OUTPATIENT
Start: 2023-07-14

## 2023-07-14 RX ORDER — MONTELUKAST SODIUM 10 MG/1
10 TABLET ORAL NIGHTLY
Qty: 90 TABLET | Refills: 3 | Status: SHIPPED | OUTPATIENT
Start: 2023-07-14

## 2023-07-14 RX ORDER — LEVOTHYROXINE SODIUM 0.05 MG/1
50 TABLET ORAL DAILY
Qty: 90 TABLET | Refills: 3 | Status: SHIPPED | OUTPATIENT
Start: 2023-07-14

## 2023-07-14 RX ORDER — OMEPRAZOLE 20 MG/1
CAPSULE, DELAYED RELEASE ORAL
Qty: 90 CAPSULE | Refills: 3 | Status: SHIPPED | OUTPATIENT
Start: 2023-07-14

## 2023-07-14 RX ORDER — MELOXICAM 15 MG/1
TABLET ORAL
Qty: 90 TABLET | Refills: 3 | Status: SHIPPED | OUTPATIENT
Start: 2023-07-14

## 2023-07-14 RX ORDER — ALBUTEROL SULFATE 90 UG/1
2 AEROSOL, METERED RESPIRATORY (INHALATION) 4 TIMES DAILY PRN
Qty: 18 G | Refills: 1 | OUTPATIENT
Start: 2023-07-14

## 2023-07-14 RX ORDER — DULOXETIN HYDROCHLORIDE 60 MG/1
60 CAPSULE, DELAYED RELEASE ORAL DAILY
Qty: 90 CAPSULE | Refills: 3 | Status: SHIPPED | OUTPATIENT
Start: 2023-07-14

## 2023-07-14 RX ORDER — MECLIZINE HYDROCHLORIDE 25 MG/1
TABLET ORAL
Qty: 30 TABLET | Refills: 2 | Status: SHIPPED | OUTPATIENT
Start: 2023-07-14

## 2023-07-14 RX ORDER — OXYBUTYNIN CHLORIDE 15 MG/1
15 TABLET, EXTENDED RELEASE ORAL DAILY
Qty: 90 TABLET | Refills: 3 | Status: SHIPPED | OUTPATIENT
Start: 2023-07-14

## 2023-07-14 RX ORDER — ALPRAZOLAM 1 MG/1
1 TABLET ORAL NIGHTLY PRN
Qty: 90 TABLET | Refills: 0 | Status: SHIPPED | OUTPATIENT
Start: 2023-07-14 | End: 2023-10-12

## 2023-08-16 ENCOUNTER — PATIENT MESSAGE (OUTPATIENT)
Dept: FAMILY MEDICINE CLINIC | Age: 58
End: 2023-08-16

## 2023-08-16 DIAGNOSIS — N39.0 URINARY TRACT INFECTION WITHOUT HEMATURIA, SITE UNSPECIFIED: Primary | ICD-10-CM

## 2023-08-17 NOTE — TELEPHONE ENCOUNTER
From: Sachi Jacobs  To: Dilma Adiel  Sent: 8/16/2023 6:25 PM EDT  Subject: UTI    Hi, heather I have a UTI and was wondering if you can send me antibiotics to [facility] on [address]  Any questions please call me 21 [phone] thank you

## 2023-08-22 ENCOUNTER — OFFICE VISIT (OUTPATIENT)
Dept: FAMILY MEDICINE CLINIC | Age: 58
End: 2023-08-22

## 2023-08-22 VITALS
DIASTOLIC BLOOD PRESSURE: 84 MMHG | WEIGHT: 136.8 LBS | HEIGHT: 59 IN | TEMPERATURE: 97.5 F | HEART RATE: 85 BPM | OXYGEN SATURATION: 100 % | BODY MASS INDEX: 27.58 KG/M2 | SYSTOLIC BLOOD PRESSURE: 136 MMHG

## 2023-08-22 DIAGNOSIS — R39.9 UTI SYMPTOMS: Primary | ICD-10-CM

## 2023-08-22 DIAGNOSIS — R30.0 DYSURIA: ICD-10-CM

## 2023-08-22 LAB
BILIRUBIN, POC: NORMAL
BLOOD URINE, POC: NORMAL
CLARITY, POC: CLEAR
COLOR, POC: NORMAL
GLUCOSE URINE, POC: NORMAL
KETONES, POC: NORMAL
LEUKOCYTE EST, POC: NORMAL
NITRITE, POC: NORMAL
PH, POC: 6
PROTEIN, POC: NORMAL
SPECIFIC GRAVITY, POC: 1.01
UROBILINOGEN, POC: NORMAL

## 2023-08-22 PROCEDURE — 99213 OFFICE O/P EST LOW 20 MIN: CPT | Performed by: NURSE PRACTITIONER

## 2023-08-22 PROCEDURE — 81003 URINALYSIS AUTO W/O SCOPE: CPT | Performed by: NURSE PRACTITIONER

## 2023-08-22 RX ORDER — NITROFURANTOIN 25; 75 MG/1; MG/1
100 CAPSULE ORAL 2 TIMES DAILY
Qty: 10 CAPSULE | Refills: 0 | Status: SHIPPED | OUTPATIENT
Start: 2023-08-22 | End: 2023-08-27

## 2023-08-22 ASSESSMENT — ENCOUNTER SYMPTOMS
VOMITING: 0
NAUSEA: 0

## 2023-08-22 NOTE — PROGRESS NOTES
She is alert and oriented to person, place, and time. Psychiatric:         Mood and Affect: Mood normal.         Behavior: Behavior normal.       Assessment:          Diagnosis Orders   1. UTI symptoms  nitrofurantoin, macrocrystal-monohydrate, (MACROBID) 100 MG capsule      2. Dysuria  POCT Urinalysis No Micro (Auto)    Culture, Urine          Plan:      Orders Placed This Encounter   Procedures    Culture, Urine     Standing Status:   Future     Standing Expiration Date:   8/22/2024    POCT Urinalysis No Micro (Auto)          Orders Placed This Encounter   Medications    nitrofurantoin, macrocrystal-monohydrate, (MACROBID) 100 MG capsule     Sig: Take 1 capsule by mouth 2 times daily for 5 days     Dispense:  10 capsule     Refill:  0       Return if symptoms worsen or fail to improve. 1. Dysuria    - POCT Urinalysis No Micro (Auto)  - Culture, Urine; Future    2. UTI symptoms  Encouraged increase in fluids and rest. Ibuprofen and tylenol as needed. Discussed otc comfort care. - nitrofurantoin, macrocrystal-monohydrate, (MACROBID) 100 MG capsule; Take 1 capsule by mouth 2 times daily for 5 days  Dispense: 10 capsule; Refill: 0      Reviewed with the patient: current clinicalstatus, medications, activities and diet. Side effects, adverse effects of the medication prescribedtoday, as well as treatment plan/ rationale and result expectations have been discussedwith the patient who expresses understanding and desires to proceed. Close follow upto evaluate treatment results and for coordination of care. I have reviewedthe patient's medical history in detail and updated the computerized patient record.     Vangie Seip, APRN - CNP

## 2023-08-23 LAB — BACTERIA UR CULT: NORMAL

## 2023-09-25 ENCOUNTER — OFFICE VISIT (OUTPATIENT)
Dept: FAMILY MEDICINE CLINIC | Age: 58
End: 2023-09-25
Payer: COMMERCIAL

## 2023-09-25 VITALS
SYSTOLIC BLOOD PRESSURE: 136 MMHG | BODY MASS INDEX: 27.42 KG/M2 | WEIGHT: 136 LBS | HEIGHT: 59 IN | DIASTOLIC BLOOD PRESSURE: 74 MMHG | HEART RATE: 86 BPM

## 2023-09-25 DIAGNOSIS — E78.2 MIXED HYPERLIPIDEMIA: ICD-10-CM

## 2023-09-25 DIAGNOSIS — K21.9 GASTROESOPHAGEAL REFLUX DISEASE, UNSPECIFIED WHETHER ESOPHAGITIS PRESENT: Primary | ICD-10-CM

## 2023-09-25 DIAGNOSIS — Z12.39 SCREENING BREAST EXAMINATION: ICD-10-CM

## 2023-09-25 DIAGNOSIS — E03.9 ACQUIRED HYPOTHYROIDISM: ICD-10-CM

## 2023-09-25 DIAGNOSIS — F51.01 PRIMARY INSOMNIA: ICD-10-CM

## 2023-09-25 PROCEDURE — 99214 OFFICE O/P EST MOD 30 MIN: CPT | Performed by: NURSE PRACTITIONER

## 2023-09-25 RX ORDER — ALPRAZOLAM 1 MG/1
1 TABLET ORAL NIGHTLY PRN
Qty: 90 TABLET | Refills: 0 | Status: SHIPPED | OUTPATIENT
Start: 2023-10-14 | End: 2024-01-12

## 2023-09-26 ASSESSMENT — ENCOUNTER SYMPTOMS
GLOBUS SENSATION: 0
HEARTBURN: 0
CHOKING: 0
NAUSEA: 0
BACK PAIN: 0
WATER BRASH: 0
BELCHING: 0
STRIDOR: 0
VISUAL CHANGE: 0
VOMITING: 0
SWOLLEN GLANDS: 0
WHEEZING: 0
ABDOMINAL PAIN: 0
COUGH: 0
HOARSE VOICE: 0
CHANGE IN BOWEL HABIT: 0
SORE THROAT: 0

## 2023-09-26 NOTE — PROGRESS NOTES
Negative for abdominal pain, anorexia, change in bowel habit, dysphagia, heartburn, melena, nausea and vomiting. Genitourinary:  Negative for frequency and urgency. Musculoskeletal:  Positive for myalgias. Negative for arthralgias, back pain, joint swelling, muscle weakness and neck pain. Skin:  Negative for rash. Neurological:  Negative for vertigo, weakness, numbness and headaches. Physical Exam  Constitutional:       General: She is not in acute distress. Appearance: She is well-developed. HENT:      Head: Normocephalic and atraumatic. Right Ear: Tympanic membrane and external ear normal.      Left Ear: Tympanic membrane and external ear normal.      Nose: Nose normal.   Eyes:      Conjunctiva/sclera: Conjunctivae normal.      Pupils: Pupils are equal, round, and reactive to light. Neck:      Thyroid: Thyromegaly present. No thyroid mass or thyroid tenderness. Vascular: No JVD. Cardiovascular:      Rate and Rhythm: Normal rate and regular rhythm. Pulses: Normal pulses. Heart sounds: Normal heart sounds. No murmur heard. Pulmonary:      Effort: Pulmonary effort is normal. No respiratory distress. Breath sounds: Normal breath sounds. No wheezing or rales. Abdominal:      General: Bowel sounds are normal. There is no distension. Palpations: Abdomen is soft. There is no mass. Tenderness: There is no abdominal tenderness. Musculoskeletal:      Cervical back: Normal range of motion and neck supple. Lymphadenopathy:      Cervical: No cervical adenopathy. Skin:     General: Skin is warm and dry. Capillary Refill: Capillary refill takes less than 2 seconds. Neurological:      Mental Status: She is alert and oriented to person, place, and time.        On this date 09/26/23 I have spent 30 minutes reviewing previous notes, test results and face to face with the patient discussing the diagnosis and importance of compliance with the treatment plan as

## 2023-09-29 DIAGNOSIS — E03.9 ACQUIRED HYPOTHYROIDISM: ICD-10-CM

## 2023-09-29 DIAGNOSIS — K21.9 GASTROESOPHAGEAL REFLUX DISEASE, UNSPECIFIED WHETHER ESOPHAGITIS PRESENT: ICD-10-CM

## 2023-09-29 DIAGNOSIS — E78.2 MIXED HYPERLIPIDEMIA: ICD-10-CM

## 2023-09-29 LAB
ALBUMIN SERPL-MCNC: 3.9 G/DL (ref 3.5–4.6)
ALP SERPL-CCNC: 122 U/L (ref 40–130)
ALT SERPL-CCNC: 20 U/L (ref 0–33)
ANION GAP SERPL CALCULATED.3IONS-SCNC: 9 MEQ/L (ref 9–15)
AST SERPL-CCNC: 24 U/L (ref 0–35)
BASOPHILS # BLD: 0 K/UL (ref 0–0.2)
BASOPHILS NFR BLD: 0.7 %
BILIRUB SERPL-MCNC: 0.3 MG/DL (ref 0.2–0.7)
BUN SERPL-MCNC: 14 MG/DL (ref 6–20)
CALCIUM SERPL-MCNC: 9.6 MG/DL (ref 8.5–9.9)
CHLORIDE SERPL-SCNC: 103 MEQ/L (ref 95–107)
CHOLEST SERPL-MCNC: 211 MG/DL (ref 0–199)
CO2 SERPL-SCNC: 30 MEQ/L (ref 20–31)
CREAT SERPL-MCNC: 0.73 MG/DL (ref 0.5–0.9)
EOSINOPHIL # BLD: 0.1 K/UL (ref 0–0.7)
EOSINOPHIL NFR BLD: 2.4 %
ERYTHROCYTE [DISTWIDTH] IN BLOOD BY AUTOMATED COUNT: 13.3 % (ref 11.5–14.5)
GLOBULIN SER CALC-MCNC: 3 G/DL (ref 2.3–3.5)
GLUCOSE SERPL-MCNC: 94 MG/DL (ref 70–99)
HCT VFR BLD AUTO: 42.1 % (ref 37–47)
HDLC SERPL-MCNC: 68 MG/DL (ref 40–59)
HGB BLD-MCNC: 13.2 G/DL (ref 12–16)
LDLC SERPL CALC-MCNC: 125 MG/DL (ref 0–129)
LYMPHOCYTES # BLD: 2.2 K/UL (ref 1–4.8)
LYMPHOCYTES NFR BLD: 37.8 %
MCH RBC QN AUTO: 28.6 PG (ref 27–31.3)
MCHC RBC AUTO-ENTMCNC: 31.4 % (ref 33–37)
MCV RBC AUTO: 91.3 FL (ref 79.4–94.8)
MONOCYTES # BLD: 0.6 K/UL (ref 0.2–0.8)
MONOCYTES NFR BLD: 9.9 %
NEUTROPHILS # BLD: 2.9 K/UL (ref 1.4–6.5)
NEUTS SEG NFR BLD: 48.9 %
PLATELET # BLD AUTO: 262 K/UL (ref 130–400)
POTASSIUM SERPL-SCNC: 4.6 MEQ/L (ref 3.4–4.9)
PROT SERPL-MCNC: 6.9 G/DL (ref 6.3–8)
RBC # BLD AUTO: 4.61 M/UL (ref 4.2–5.4)
SODIUM SERPL-SCNC: 142 MEQ/L (ref 135–144)
TRIGL SERPL-MCNC: 92 MG/DL (ref 0–150)
TSH SERPL-MCNC: 2.37 UIU/ML (ref 0.44–3.86)
WBC # BLD AUTO: 5.8 K/UL (ref 4.8–10.8)

## 2024-01-10 DIAGNOSIS — F51.01 PRIMARY INSOMNIA: ICD-10-CM

## 2024-01-12 RX ORDER — ALPRAZOLAM 1 MG/1
1 TABLET ORAL NIGHTLY PRN
Qty: 90 TABLET | Refills: 0 | OUTPATIENT
Start: 2024-01-12 | End: 2024-04-11

## 2024-01-12 NOTE — TELEPHONE ENCOUNTER
Needs an appointment  You cn add her to my provider fill time one day to accommodate getting her an appointment,  It can be virtual if she prefers

## 2024-01-13 ASSESSMENT — PATIENT HEALTH QUESTIONNAIRE - PHQ9
SUM OF ALL RESPONSES TO PHQ9 QUESTIONS 1 & 2: 0
2. FEELING DOWN, DEPRESSED OR HOPELESS: 0
1. LITTLE INTEREST OR PLEASURE IN DOING THINGS: NOT AT ALL
SUM OF ALL RESPONSES TO PHQ QUESTIONS 1-9: 0
SUM OF ALL RESPONSES TO PHQ QUESTIONS 1-9: 0
1. LITTLE INTEREST OR PLEASURE IN DOING THINGS: 0
SUM OF ALL RESPONSES TO PHQ QUESTIONS 1-9: 0
SUM OF ALL RESPONSES TO PHQ9 QUESTIONS 1 & 2: 0
SUM OF ALL RESPONSES TO PHQ QUESTIONS 1-9: 0
2. FEELING DOWN, DEPRESSED OR HOPELESS: NOT AT ALL

## 2024-01-16 ENCOUNTER — OFFICE VISIT (OUTPATIENT)
Dept: FAMILY MEDICINE CLINIC | Age: 59
End: 2024-01-16
Payer: COMMERCIAL

## 2024-01-16 VITALS
WEIGHT: 137 LBS | HEIGHT: 59 IN | DIASTOLIC BLOOD PRESSURE: 80 MMHG | SYSTOLIC BLOOD PRESSURE: 128 MMHG | BODY MASS INDEX: 27.62 KG/M2

## 2024-01-16 DIAGNOSIS — E03.9 ACQUIRED HYPOTHYROIDISM: Primary | ICD-10-CM

## 2024-01-16 DIAGNOSIS — F51.01 PRIMARY INSOMNIA: ICD-10-CM

## 2024-01-16 DIAGNOSIS — E78.2 MIXED HYPERLIPIDEMIA: ICD-10-CM

## 2024-01-16 DIAGNOSIS — Z12.31 SCREENING MAMMOGRAM FOR BREAST CANCER: ICD-10-CM

## 2024-01-16 DIAGNOSIS — Z23 FLU VACCINE NEED: ICD-10-CM

## 2024-01-16 DIAGNOSIS — K21.9 GASTROESOPHAGEAL REFLUX DISEASE, UNSPECIFIED WHETHER ESOPHAGITIS PRESENT: ICD-10-CM

## 2024-01-16 PROCEDURE — 90674 CCIIV4 VAC NO PRSV 0.5 ML IM: CPT | Performed by: NURSE PRACTITIONER

## 2024-01-16 PROCEDURE — 90471 IMMUNIZATION ADMIN: CPT | Performed by: NURSE PRACTITIONER

## 2024-01-16 PROCEDURE — 99214 OFFICE O/P EST MOD 30 MIN: CPT | Performed by: NURSE PRACTITIONER

## 2024-01-16 RX ORDER — ALPRAZOLAM 1 MG/1
1 TABLET ORAL NIGHTLY PRN
Qty: 90 TABLET | Refills: 0 | Status: SHIPPED | OUTPATIENT
Start: 2024-01-16 | End: 2024-02-15

## 2024-01-23 ASSESSMENT — ENCOUNTER SYMPTOMS
HOARSE VOICE: 0
BELCHING: 0
HEARTBURN: 0
ABDOMINAL PAIN: 0
SWOLLEN GLANDS: 0
WHEEZING: 0
VOMITING: 0
VISUAL CHANGE: 0
CHANGE IN BOWEL HABIT: 0
GLOBUS SENSATION: 0
CHOKING: 0
STRIDOR: 0
NAUSEA: 0
BACK PAIN: 0
SORE THROAT: 0
COUGH: 0

## 2024-01-24 NOTE — PROGRESS NOTES
Negative for chills, diaphoresis, fatigue, fever and weight loss.   HENT:  Negative for congestion, hoarse voice and sore throat.    Respiratory:  Negative for cough, choking and wheezing.    Cardiovascular:  Negative for chest pain.   Gastrointestinal:  Negative for abdominal pain, anorexia, change in bowel habit, dysphagia, heartburn, melena, nausea and vomiting.   Genitourinary:  Negative for frequency and urgency.   Musculoskeletal:  Positive for myalgias. Negative for arthralgias, back pain, joint swelling, muscle weakness and neck pain.   Skin:  Negative for rash.   Neurological:  Negative for vertigo, weakness, numbness and headaches.       Physical Exam  Constitutional:       General: She is not in acute distress.     Appearance: She is well-developed.   HENT:      Head: Normocephalic and atraumatic.      Right Ear: Tympanic membrane and external ear normal.      Left Ear: Tympanic membrane and external ear normal.      Nose: Nose normal.   Eyes:      Conjunctiva/sclera: Conjunctivae normal.      Pupils: Pupils are equal, round, and reactive to light.   Neck:      Thyroid: Thyromegaly present. No thyroid mass or thyroid tenderness.      Vascular: No JVD.   Cardiovascular:      Rate and Rhythm: Normal rate and regular rhythm.      Pulses: Normal pulses.      Heart sounds: Normal heart sounds. No murmur heard.  Pulmonary:      Effort: Pulmonary effort is normal. No respiratory distress.      Breath sounds: Normal breath sounds. No wheezing or rales.   Abdominal:      General: Bowel sounds are normal. There is no distension.      Palpations: Abdomen is soft. There is no mass.      Tenderness: There is no abdominal tenderness.   Musculoskeletal:      Cervical back: Normal range of motion and neck supple.   Lymphadenopathy:      Cervical: No cervical adenopathy.   Skin:     General: Skin is warm and dry.      Capillary Refill: Capillary refill takes less than 2 seconds.   Neurological:      Mental Status: She is

## 2024-01-30 NOTE — PROGRESS NOTES
History of Present Illness:  Presents for evaluation of right ring trigger.  The symptoms have been present for months. The patient denies any inciting trauma. The pain is localized to the A1 pulley of the affected finger. It is described as moderate. The pain/locking occurs intermittantly and is more severe overnight and in the morning.     Additionally she has some pain to the right ring DIP.  This is worse with motion.  States this is also been present for a few months.    Review of Systems   GENERAL: Negative for malaise, significant weight loss, fever  MUSCULOSKELETAL: see HPI  NEURO:  Negative    The patient's past medical history, family history, social history, and review of systems were reviewed. History is otherwise negative except as stated in the HPI.    Physical Examination:  General: Alert and oriented to person, place, and time.  No acute distress and breathing comfortably: Pleasant and cooperative with examination.  HEENT: Head is normocephalic and atraumatic.  Neck: Supple, no visible swelling.  Cardiovascular: No palpable tachycardia  Lungs: No audible wheezing or labored breathing  Abdomen: Nondistended.  On musculoskeletal examination, the elbow and wrist have full, symmetric range of motion without obvious tenderness to palpation. Strength is full. Sensation and motor function are intact in the radial, ulnar, and median nerve distribution. There is no thenar or intrinsic atrophy with appropriate strength. There is obvious triggering of the right ring with tenderness over the corresponding A1 pulley.  Additionally she has some tenderness to the right ring DIP particularly dorsally, . the adjacent fingers are unaffected. There is intact flexor and extensor tendon function. The patient can make a full composite fist but has pain while doing so. The hand itself is warm and well perfused. The skin is intact throughout. The contralateral hand and wrist are normal to inspection, range of motion,  stability, and strength.    Imaging:  N/A    Hand / UE Inj/Asp: R ring A1 for trigger finger on 2/5/2024 8:11 AM  Indications: pain  Details: 24 G needle, volar approach  Medications: 5 mg triamcinolone acetonide 10 mg/mL; 0.5 mL lidocaine 10 mg/mL (1 %)  Procedure, treatment alternatives, risks and benefits explained, specific risks discussed. Consent was given by the patient. Immediately prior to procedure a time out was called to verify the correct patient, procedure, equipment, support staff and site/side marked as required.           Assessment:  Patient with a right ring trigger finger.  Additionally some DIP arthritis.      Plan:  Right ring trigger finger injection. I had a long discussion with the patient regarding the diagnosis of trigger finger and the risks/benefits/expected outcomes of various treatment options.  At this point, the patient elected non-operative treatment consisting of a corticosteroid injection. I reviewed the specific risks of injection, which include, but are not limited to, infection, bleeding, pain, steroid flare, glycemic alteration, subcutaneous fat atrophy, skin hypopigmentation, soft tissue damage, and incomplete symptom relief. The patient consented to the injection. Then, using sterile technique, I injected 1mL of Kenalog 40 into the area of the flexor sheath at the level of the A1 pulley. The injection site was dressed, and the patient tolerated the injection well. Finally, I emphasized patience, as any benefit may take some time to manifest.     With regard to the DIP pain and arthritis I recommended Voltaren as needed.  Will see how she is doing with this in a month.  Consider a DIP injection at that point if not improving.        Follow up: 1 month      Misty Watson MD  Orthopaedic Surgeon

## 2024-02-05 ENCOUNTER — OFFICE VISIT (OUTPATIENT)
Dept: ORTHOPEDIC SURGERY | Facility: CLINIC | Age: 59
End: 2024-02-05
Payer: COMMERCIAL

## 2024-02-05 DIAGNOSIS — M79.641 PAIN OF RIGHT HAND: ICD-10-CM

## 2024-02-05 PROCEDURE — 99204 OFFICE O/P NEW MOD 45 MIN: CPT | Performed by: STUDENT IN AN ORGANIZED HEALTH CARE EDUCATION/TRAINING PROGRAM

## 2024-02-05 PROCEDURE — 2500000004 HC RX 250 GENERAL PHARMACY W/ HCPCS (ALT 636 FOR OP/ED): Performed by: STUDENT IN AN ORGANIZED HEALTH CARE EDUCATION/TRAINING PROGRAM

## 2024-02-05 PROCEDURE — 99214 OFFICE O/P EST MOD 30 MIN: CPT | Mod: 25 | Performed by: STUDENT IN AN ORGANIZED HEALTH CARE EDUCATION/TRAINING PROGRAM

## 2024-02-05 PROCEDURE — 2500000005 HC RX 250 GENERAL PHARMACY W/O HCPCS: Performed by: STUDENT IN AN ORGANIZED HEALTH CARE EDUCATION/TRAINING PROGRAM

## 2024-02-05 PROCEDURE — 20550 NJX 1 TENDON SHEATH/LIGAMENT: CPT | Mod: RT | Performed by: STUDENT IN AN ORGANIZED HEALTH CARE EDUCATION/TRAINING PROGRAM

## 2024-02-05 RX ORDER — DICLOFENAC SODIUM 10 MG/G
GEL TOPICAL
Qty: 150 G | Refills: 2 | Status: SHIPPED | OUTPATIENT
Start: 2024-02-05

## 2024-02-05 RX ORDER — LIDOCAINE HYDROCHLORIDE 10 MG/ML
0.5 INJECTION INFILTRATION; PERINEURAL
Status: COMPLETED | OUTPATIENT
Start: 2024-02-05 | End: 2024-02-05

## 2024-02-05 RX ADMIN — LIDOCAINE HYDROCHLORIDE 0.5 ML: 10 INJECTION, SOLUTION INFILTRATION; PERINEURAL at 08:11

## 2024-02-05 RX ADMIN — TRIAMCINOLONE ACETONIDE 5 MG: 10 INJECTION, SUSPENSION INTRA-ARTICULAR; INTRALESIONAL at 08:11

## 2024-02-26 ENCOUNTER — HOSPITAL ENCOUNTER (OUTPATIENT)
Dept: RADIOLOGY | Facility: CLINIC | Age: 59
Discharge: HOME | End: 2024-02-26
Payer: COMMERCIAL

## 2024-02-26 DIAGNOSIS — Z12.31 ENCOUNTER FOR SCREENING MAMMOGRAM FOR MALIGNANT NEOPLASM OF BREAST: ICD-10-CM

## 2024-02-26 LAB — MAMMOGRAPHY, EXTERNAL: NORMAL

## 2024-02-26 PROCEDURE — 77063 BREAST TOMOSYNTHESIS BI: CPT | Performed by: RADIOLOGY

## 2024-02-26 PROCEDURE — 77067 SCR MAMMO BI INCL CAD: CPT

## 2024-02-26 PROCEDURE — 77067 SCR MAMMO BI INCL CAD: CPT | Performed by: RADIOLOGY

## 2024-03-01 ENCOUNTER — APPOINTMENT (OUTPATIENT)
Dept: ORTHOPEDIC SURGERY | Facility: CLINIC | Age: 59
End: 2024-03-01
Payer: COMMERCIAL

## 2024-03-04 ENCOUNTER — OFFICE VISIT (OUTPATIENT)
Dept: ORTHOPEDIC SURGERY | Facility: CLINIC | Age: 59
End: 2024-03-04
Payer: COMMERCIAL

## 2024-03-04 DIAGNOSIS — M79.641 PAIN OF RIGHT HAND: Primary | ICD-10-CM

## 2024-03-04 PROCEDURE — 2500000005 HC RX 250 GENERAL PHARMACY W/O HCPCS: Performed by: STUDENT IN AN ORGANIZED HEALTH CARE EDUCATION/TRAINING PROGRAM

## 2024-03-04 PROCEDURE — 20600 DRAIN/INJ JOINT/BURSA W/O US: CPT | Mod: RT | Performed by: STUDENT IN AN ORGANIZED HEALTH CARE EDUCATION/TRAINING PROGRAM

## 2024-03-04 PROCEDURE — 99214 OFFICE O/P EST MOD 30 MIN: CPT | Performed by: STUDENT IN AN ORGANIZED HEALTH CARE EDUCATION/TRAINING PROGRAM

## 2024-03-04 PROCEDURE — 2500000004 HC RX 250 GENERAL PHARMACY W/ HCPCS (ALT 636 FOR OP/ED): Performed by: STUDENT IN AN ORGANIZED HEALTH CARE EDUCATION/TRAINING PROGRAM

## 2024-03-04 RX ORDER — LIDOCAINE HYDROCHLORIDE 10 MG/ML
0.5 INJECTION INFILTRATION; PERINEURAL
Status: COMPLETED | OUTPATIENT
Start: 2024-03-04 | End: 2024-03-04

## 2024-03-04 RX ADMIN — TRIAMCINOLONE ACETONIDE 5 MG: 10 INJECTION, SUSPENSION INTRA-ARTICULAR; INTRALESIONAL at 11:43

## 2024-03-04 RX ADMIN — LIDOCAINE HYDROCHLORIDE 0.5 ML: 10 INJECTION, SOLUTION INFILTRATION; PERINEURAL at 11:43

## 2024-03-04 NOTE — PROGRESS NOTES
History of Present Illness  Patient returns today for evaluation of right ring trigger finger.  This has completely and proved from injection on February 5.  She does still have pain to the right ring DIP.  This is consistent with arthritis     Physical Examination:  Right upper extremity:  The patient appears to be their stated age, is in no apparent distress, and is oriented x3. The patients mood and affect are appropriate. The patients gait is normal. The examination of the limb in question was performed in comparison to the contralateral limb.    On musculoskeletal examination, tenderness palpation over the ring finger DIP.  No erythema warmth or signs of infection    Sensation and motor function are intact in the radial, and median nerve distribution. There is no obvious thenar atrophy, and thenar strength is 5/5. There is no intrinsic atrophy, and intrinsic strength is 5/5.  The patient can make a full composite fist. The hand itself is warm and well perfused. The skin is intact throughout. The contralateral hand/wrist are normal to inspection, range of motion, stability, and strength.    Hand / UE Inj/Asp: R ring DIP for osteoarthritis on 3/4/2024 11:43 AM  Indications: pain  Details: 24 G needle, volar approach  Medications: 5 mg triamcinolone acetonide 10 mg/mL; 0.5 mL lidocaine 10 mg/mL (1 %)  Procedure, treatment alternatives, risks and benefits explained, specific risks discussed. Consent was given by the patient. Immediately prior to procedure a time out was called to verify the correct patient, procedure, equipment, support staff and site/side marked as required.             Assessment:  Patient with right ring DIP arthritis pain.  Injection provided today.    Plan:   Injection.  I explained the risks and benefits of an injection. Specifically, I reviewed the risks of injection, which include, but are not limited to, infection, bleeding, nerve injury, pain, steroid flare, glycemic alteration,  subcutaneous fat atrophy, skin hypopigmentation, soft tissue damage, and incomplete symptom relief. At this time, the patient would like to proceed with an injection. After obtaining consent, I injected a 1mL combination of Kenalog and 1% lidocaine into right ring DIP, sterile technique. The injection site was dressed, and the patient tolerated the injection well. I am hopeful that this injection will serve diagnostic, prognostic, and therapeutic purposes. Finally, I have emphasized patience, as any benefit may take several weeks to manifest. Depending on the success of the injection, I will see them back 2 months    Needs right hand x-rays at follow-up    Misty Watson MD

## 2024-03-05 ENCOUNTER — HOSPITAL ENCOUNTER (OUTPATIENT)
Dept: RADIOLOGY | Facility: EXTERNAL LOCATION | Age: 59
Discharge: HOME | End: 2024-03-05

## 2024-03-05 DIAGNOSIS — Z12.31 ENCOUNTER FOR SCREENING MAMMOGRAM FOR MALIGNANT NEOPLASM OF BREAST: ICD-10-CM

## 2024-03-19 ENCOUNTER — APPOINTMENT (OUTPATIENT)
Dept: ORTHOPEDIC SURGERY | Facility: CLINIC | Age: 59
End: 2024-03-19
Payer: COMMERCIAL

## 2024-04-03 DIAGNOSIS — K21.9 GASTROESOPHAGEAL REFLUX DISEASE, UNSPECIFIED WHETHER ESOPHAGITIS PRESENT: ICD-10-CM

## 2024-04-03 DIAGNOSIS — Z12.31 SCREENING MAMMOGRAM FOR BREAST CANCER: ICD-10-CM

## 2024-04-03 DIAGNOSIS — E78.2 MIXED HYPERLIPIDEMIA: ICD-10-CM

## 2024-04-03 DIAGNOSIS — F51.01 PRIMARY INSOMNIA: ICD-10-CM

## 2024-04-03 DIAGNOSIS — E03.9 ACQUIRED HYPOTHYROIDISM: ICD-10-CM

## 2024-04-03 LAB
ALBUMIN SERPL-MCNC: 4.2 G/DL (ref 3.5–4.6)
ALP SERPL-CCNC: 128 U/L (ref 40–130)
ALT SERPL-CCNC: 20 U/L (ref 0–33)
ANION GAP SERPL CALCULATED.3IONS-SCNC: 9 MEQ/L (ref 9–15)
AST SERPL-CCNC: 25 U/L (ref 0–35)
BASOPHILS # BLD: 0 K/UL (ref 0–0.2)
BASOPHILS NFR BLD: 0.6 %
BILIRUB SERPL-MCNC: <0.2 MG/DL (ref 0.2–0.7)
BUN SERPL-MCNC: 16 MG/DL (ref 6–20)
CALCIUM SERPL-MCNC: 9.7 MG/DL (ref 8.5–9.9)
CHLORIDE SERPL-SCNC: 106 MEQ/L (ref 95–107)
CHOLEST SERPL-MCNC: 206 MG/DL (ref 0–199)
CO2 SERPL-SCNC: 28 MEQ/L (ref 20–31)
CREAT SERPL-MCNC: 0.75 MG/DL (ref 0.5–0.9)
EOSINOPHIL # BLD: 0.1 K/UL (ref 0–0.7)
EOSINOPHIL NFR BLD: 2.3 %
ERYTHROCYTE [DISTWIDTH] IN BLOOD BY AUTOMATED COUNT: 12.8 % (ref 11.5–14.5)
GLOBULIN SER CALC-MCNC: 2.9 G/DL (ref 2.3–3.5)
GLUCOSE SERPL-MCNC: 96 MG/DL (ref 70–99)
HCT VFR BLD AUTO: 41.4 % (ref 37–47)
HDLC SERPL-MCNC: 67 MG/DL (ref 40–59)
HGB BLD-MCNC: 13.6 G/DL (ref 12–16)
LDLC SERPL CALC-MCNC: 121 MG/DL (ref 0–129)
LYMPHOCYTES # BLD: 2.6 K/UL (ref 1–4.8)
LYMPHOCYTES NFR BLD: 41.3 %
MCH RBC QN AUTO: 29.2 PG (ref 27–31.3)
MCHC RBC AUTO-ENTMCNC: 32.9 % (ref 33–37)
MCV RBC AUTO: 89 FL (ref 79.4–94.8)
MONOCYTES # BLD: 0.7 K/UL (ref 0.2–0.8)
MONOCYTES NFR BLD: 10.6 %
NEUTROPHILS # BLD: 2.8 K/UL (ref 1.4–6.5)
NEUTS SEG NFR BLD: 45 %
PLATELET # BLD AUTO: 265 K/UL (ref 130–400)
POTASSIUM SERPL-SCNC: 3.9 MEQ/L (ref 3.4–4.9)
PROT SERPL-MCNC: 7.1 G/DL (ref 6.3–8)
RBC # BLD AUTO: 4.65 M/UL (ref 4.2–5.4)
SODIUM SERPL-SCNC: 143 MEQ/L (ref 135–144)
TRIGL SERPL-MCNC: 92 MG/DL (ref 0–150)
TSH SERPL-MCNC: 3.81 UIU/ML (ref 0.44–3.86)
WBC # BLD AUTO: 6.2 K/UL (ref 4.8–10.8)

## 2024-04-06 LAB
6MAM UR QL: NOT DETECTED
7-AMINOCLONAZEPAM: NOT DETECTED
ALPHA-OH-ALPRAZOLAM: PRESENT
ALPHA-OH-MIDAZOLAM, URINE: NOT DETECTED
ALPRAZOLAM: PRESENT
AMPHET UR QL SCN: NOT DETECTED
BARBITURATES: NEGATIVE
BENZOYLECGONINE: NEGATIVE
BUPRENORPHINE: NOT DETECTED
CARISOPRODOL UR QL: NEGATIVE
CLONAZEPAM UR QL: NOT DETECTED
CODEINE: NOT DETECTED
CREAT UR-MCNC: 194.8 MG/DL (ref 20–400)
DIAZEPAM: NOT DETECTED
ETHYL GLUCURONIDE: NEGATIVE
FENTANYL UR QL: NOT DETECTED
GABAPENTIN: NOT DETECTED
HYDROCODONE UR QL: NOT DETECTED
HYDROMORPHONE: NOT DETECTED
LORAZEPAM UR QL: NOT DETECTED
MARIJUANA METABOLITE: NEGATIVE
MDA: NOT DETECTED
MDEA: NOT DETECTED
MDMA UR QL: NOT DETECTED
MEPERIDINE: NOT DETECTED
METHADONE: NEGATIVE
METHAMPHETAMINE: NOT DETECTED
METHYLPHENIDATE: NOT DETECTED
MIDAZOLAM UR QL SCN: NOT DETECTED
MORPHINE: NOT DETECTED
NALOXONE: NOT DETECTED
NORBUPRENORPHINE, FREE: NOT DETECTED
NORDIAZEPAM: NOT DETECTED
NORFENTANYL: NOT DETECTED
NORHYDROCODONE, URINE: NOT DETECTED
NOROXYCODONE: NOT DETECTED
NOROXYMORPHONE, URINE: NOT DETECTED
OXAZEPAM UR QL: NOT DETECTED
OXYCODONE UR QL: NOT DETECTED
OXYMORPHONE UR QL: NOT DETECTED
PAIN MANAGEMENT DRUG PANEL: NORMAL
PATHOLOGY STUDY: NORMAL
PCP: NEGATIVE
PHENTERMINE: NOT DETECTED
PREGABALIN: NOT DETECTED
TAPENTADOL, URINE: NOT DETECTED
TAPENTADOL-O-SULFATE, URINE: NOT DETECTED
TEMAZEPAM: NOT DETECTED
TRAMADOL: NEGATIVE
ZOLPIDEM: NOT DETECTED

## 2024-04-15 DIAGNOSIS — F51.01 PRIMARY INSOMNIA: ICD-10-CM

## 2024-04-15 RX ORDER — ALPRAZOLAM 1 MG/1
1 TABLET ORAL NIGHTLY PRN
Qty: 90 TABLET | Refills: 0 | OUTPATIENT
Start: 2024-04-15 | End: 2024-07-14

## 2024-04-15 NOTE — TELEPHONE ENCOUNTER
Future Appointments    Encounter Information   Provider Department Appt Notes   4/16/2024 Esther Elder APRN - CNP [facility] 3 month follow up     Past Visits    Date Provider Specialty Visit Type Primary Dx   01/16/2024 Esther Elder APRN - CNP Family Medicine Office Visit Acquired hypothyroidism

## 2024-04-16 ENCOUNTER — OFFICE VISIT (OUTPATIENT)
Dept: FAMILY MEDICINE CLINIC | Age: 59
End: 2024-04-16
Payer: COMMERCIAL

## 2024-04-16 VITALS
HEIGHT: 59 IN | BODY MASS INDEX: 28.63 KG/M2 | SYSTOLIC BLOOD PRESSURE: 128 MMHG | DIASTOLIC BLOOD PRESSURE: 84 MMHG | WEIGHT: 142 LBS

## 2024-04-16 DIAGNOSIS — F41.9 ANXIETY: ICD-10-CM

## 2024-04-16 DIAGNOSIS — G47.00 INSOMNIA, UNSPECIFIED TYPE: ICD-10-CM

## 2024-04-16 PROCEDURE — 99214 OFFICE O/P EST MOD 30 MIN: CPT | Performed by: NURSE PRACTITIONER

## 2024-04-16 RX ORDER — ALPRAZOLAM 1 MG/1
1 TABLET, EXTENDED RELEASE ORAL NIGHTLY
Qty: 90 TABLET | Refills: 0 | Status: SHIPPED | OUTPATIENT
Start: 2024-04-16 | End: 2024-07-15

## 2024-04-19 ENCOUNTER — TELEPHONE (OUTPATIENT)
Dept: FAMILY MEDICINE CLINIC | Age: 59
End: 2024-04-19

## 2024-04-23 ASSESSMENT — ENCOUNTER SYMPTOMS
SORE THROAT: 0
BELCHING: 0
NAUSEA: 0
VISUAL CHANGE: 0
SWOLLEN GLANDS: 0
BACK PAIN: 0
CHOKING: 0
HEARTBURN: 0
ABDOMINAL PAIN: 0
WATER BRASH: 0
GLOBUS SENSATION: 0
WHEEZING: 0
COUGH: 0
VOMITING: 0
HOARSE VOICE: 0
CHANGE IN BOWEL HABIT: 0
STRIDOR: 0

## 2024-04-24 NOTE — PROGRESS NOTES
Negative for vertigo, weakness, numbness and headaches.       Physical Exam  Constitutional:       General: She is not in acute distress.     Appearance: She is well-developed.   HENT:      Head: Normocephalic and atraumatic.      Right Ear: Tympanic membrane and external ear normal.      Left Ear: Tympanic membrane and external ear normal.      Nose: Nose normal.   Eyes:      Conjunctiva/sclera: Conjunctivae normal.      Pupils: Pupils are equal, round, and reactive to light.   Neck:      Thyroid: Thyromegaly present. No thyroid mass or thyroid tenderness.      Vascular: No JVD.   Cardiovascular:      Rate and Rhythm: Normal rate and regular rhythm.      Pulses: Normal pulses.      Heart sounds: Normal heart sounds. No murmur heard.  Pulmonary:      Effort: Pulmonary effort is normal. No respiratory distress.      Breath sounds: Normal breath sounds. No wheezing or rales.   Abdominal:      General: Bowel sounds are normal. There is no distension.      Palpations: Abdomen is soft. There is no mass.      Tenderness: There is no abdominal tenderness.   Musculoskeletal:      Cervical back: Normal range of motion and neck supple.   Lymphadenopathy:      Cervical: No cervical adenopathy.   Skin:     General: Skin is warm and dry.      Capillary Refill: Capillary refill takes less than 2 seconds.   Neurological:      Mental Status: She is alert and oriented to person, place, and time.       On this date 04/23/24 I have spent 30 minutes reviewing previous notes, test results and face to face with the patient discussing the diagnosis and importance of compliance with the treatment plan as well as documenting on the day of the visit.      An electronic signature was used to authenticate this note.    --Esther Elder, VALENTIN - AUBREY

## 2024-04-25 DIAGNOSIS — F51.01 PRIMARY INSOMNIA: ICD-10-CM

## 2024-04-25 RX ORDER — ALPRAZOLAM 1 MG/1
1 TABLET ORAL NIGHTLY PRN
Qty: 90 TABLET | Refills: 0 | OUTPATIENT
Start: 2024-04-25 | End: 2024-07-24

## 2024-04-30 NOTE — PROGRESS NOTES
CLINICAL PHARMACY NOTE: MEDS TO [address] Select Patient?: Yes  Total # of Prescriptions Filled: 1   The following medications were delivered to the patient:  · Hydrocodone/acet.  5/325mg tab  Total # of Interventions Completed: 0  Time Spent (min): 30    Additional Documentation:  Patient picked up in pharmacy oral

## 2024-05-10 ENCOUNTER — APPOINTMENT (OUTPATIENT)
Dept: ORTHOPEDIC SURGERY | Facility: CLINIC | Age: 59
End: 2024-05-10
Payer: COMMERCIAL

## 2024-06-07 RX ORDER — LEVOTHYROXINE SODIUM 0.05 MG/1
50 TABLET ORAL DAILY
Qty: 30 TABLET | Refills: 0 | Status: SHIPPED | OUTPATIENT
Start: 2024-06-07

## 2024-06-21 ENCOUNTER — OFFICE VISIT (OUTPATIENT)
Dept: FAMILY MEDICINE CLINIC | Age: 59
End: 2024-06-21
Payer: COMMERCIAL

## 2024-06-21 VITALS
DIASTOLIC BLOOD PRESSURE: 74 MMHG | TEMPERATURE: 97.8 F | BODY MASS INDEX: 28.63 KG/M2 | OXYGEN SATURATION: 97 % | SYSTOLIC BLOOD PRESSURE: 126 MMHG | HEIGHT: 59 IN | WEIGHT: 142 LBS | HEART RATE: 95 BPM

## 2024-06-21 DIAGNOSIS — L03.114 CELLULITIS OF LEFT UPPER EXTREMITY: Primary | ICD-10-CM

## 2024-06-21 DIAGNOSIS — L30.9 DERMATITIS: ICD-10-CM

## 2024-06-21 PROCEDURE — 99213 OFFICE O/P EST LOW 20 MIN: CPT | Performed by: NURSE PRACTITIONER

## 2024-06-21 RX ORDER — DOXYCYCLINE HYCLATE 100 MG
100 TABLET ORAL 2 TIMES DAILY
Qty: 14 TABLET | Refills: 0 | Status: SHIPPED | OUTPATIENT
Start: 2024-06-21 | End: 2024-06-28

## 2024-06-21 SDOH — ECONOMIC STABILITY: FOOD INSECURITY: WITHIN THE PAST 12 MONTHS, THE FOOD YOU BOUGHT JUST DIDN'T LAST AND YOU DIDN'T HAVE MONEY TO GET MORE.: NEVER TRUE

## 2024-06-21 SDOH — ECONOMIC STABILITY: INCOME INSECURITY: HOW HARD IS IT FOR YOU TO PAY FOR THE VERY BASICS LIKE FOOD, HOUSING, MEDICAL CARE, AND HEATING?: NOT HARD AT ALL

## 2024-06-21 SDOH — ECONOMIC STABILITY: FOOD INSECURITY: WITHIN THE PAST 12 MONTHS, YOU WORRIED THAT YOUR FOOD WOULD RUN OUT BEFORE YOU GOT MONEY TO BUY MORE.: NEVER TRUE

## 2024-06-21 ASSESSMENT — ENCOUNTER SYMPTOMS: COLOR CHANGE: 1

## 2024-06-21 NOTE — PROGRESS NOTES
Subjective:      Patient ID: Kathy Izquierdo is a 59 y.o. female who presents today for:  Chief Complaint   Patient presents with    Insect Bite     Pt presents today with a possible spider bite on left arm. Red rash with 2/3 dots. Itching and discomfort. Warm to touch        HPI  Patient is here with left upper arm tenderness.   Says it is also itchy.   It is red and swollen.   Denies any fever or chills.  She thinks it is a bite.   Says she noticed it yesterday.   Past Medical History:   Diagnosis Date    Arthritis     Asthma     Chronic fatigue 4/25/2017    Dizziness 4/25/2017    Hypertension     past trx x 1 yr -- off meds > 4 yrs    Hypothyroidism     meds > 3 yrs -- intermittently trx    Lightheadedness 4/25/2017    SOB (shortness of breath) 4/24/2017    Weakness 4/25/2017     Past Surgical History:   Procedure Laterality Date    FINGER TRIGGER RELEASE Left 11/2/2020    LEFT TRIGGER THUMB RELEASE. HAND TRAY performed by Maximino Aguilar MD at [facility]    FINGER TRIGGER RELEASE Right 7/19/2021    A1 PULLEY RELEASE OF THE RIGHT THUMB performed by Maximino Aguilar MD at [facility]    HYSTERECTOMY (CERVIX STATUS UNKNOWN)  2007    preseve ovaries    PARTIAL HYSTERECTOMY (CERVIX NOT REMOVED)  2009     Social History     Socioeconomic History    Marital status: Single     Spouse name: Not on file    Number of children: Not on file    Years of education: Not on file    Highest education level: Not on file   Occupational History    Not on file   Tobacco Use    Smoking status: Former     Current packs/day: 0.00     Average packs/day: 0.1 packs/day for 13.0 years (1.3 ttl pk-yrs)     Types: Cigarettes     Start date: 10/14/2000     Quit date: 10/14/2013     Years since quitting: 10.6    Smokeless tobacco: Never   Vaping Use    Vaping Use: Never used   Substance and Sexual Activity    Alcohol use: No    Drug use: No    Sexual activity: Yes   Other Topics Concern    Not on file   Social History Narrative    Not on file

## 2024-07-05 ENCOUNTER — TELEPHONE (OUTPATIENT)
Dept: FAMILY MEDICINE CLINIC | Age: 59
End: 2024-07-05

## 2024-07-08 RX ORDER — LEVOTHYROXINE SODIUM 0.05 MG/1
50 TABLET ORAL DAILY
Qty: 30 TABLET | Refills: 0 | Status: SHIPPED | OUTPATIENT
Start: 2024-07-08

## 2024-07-10 RX ORDER — LEVOTHYROXINE SODIUM 0.05 MG/1
50 TABLET ORAL DAILY
Qty: 90 TABLET | Refills: 0 | Status: SHIPPED | OUTPATIENT
Start: 2024-07-10

## 2024-07-16 ENCOUNTER — OFFICE VISIT (OUTPATIENT)
Dept: FAMILY MEDICINE CLINIC | Age: 59
End: 2024-07-16
Payer: COMMERCIAL

## 2024-07-16 VITALS
DIASTOLIC BLOOD PRESSURE: 82 MMHG | BODY MASS INDEX: 28.83 KG/M2 | WEIGHT: 143 LBS | SYSTOLIC BLOOD PRESSURE: 130 MMHG | HEIGHT: 59 IN

## 2024-07-16 DIAGNOSIS — E78.2 MIXED HYPERLIPIDEMIA: ICD-10-CM

## 2024-07-16 DIAGNOSIS — K21.9 GASTROESOPHAGEAL REFLUX DISEASE, UNSPECIFIED WHETHER ESOPHAGITIS PRESENT: ICD-10-CM

## 2024-07-16 DIAGNOSIS — E03.9 ACQUIRED HYPOTHYROIDISM: ICD-10-CM

## 2024-07-16 DIAGNOSIS — F51.01 PRIMARY INSOMNIA: Primary | ICD-10-CM

## 2024-07-16 PROCEDURE — 99214 OFFICE O/P EST MOD 30 MIN: CPT | Performed by: NURSE PRACTITIONER

## 2024-07-16 RX ORDER — ALPRAZOLAM 1 MG/1
1 TABLET ORAL NIGHTLY PRN
Qty: 90 TABLET | Refills: 0 | Status: SHIPPED | OUTPATIENT
Start: 2024-07-16 | End: 2024-10-14

## 2024-07-16 RX ORDER — DOXEPIN HYDROCHLORIDE 10 MG/1
CAPSULE ORAL
Qty: 90 CAPSULE | Refills: 3 | Status: SHIPPED | OUTPATIENT
Start: 2024-07-16

## 2024-07-22 ASSESSMENT — ENCOUNTER SYMPTOMS
HEARTBURN: 0
SWOLLEN GLANDS: 0
BACK PAIN: 0
HOARSE VOICE: 0
BELCHING: 0
WATER BRASH: 0
NAUSEA: 0
WHEEZING: 0
ABDOMINAL PAIN: 0
VOMITING: 0
VISUAL CHANGE: 0
GLOBUS SENSATION: 0
COUGH: 0
STRIDOR: 0
CHOKING: 0
SORE THROAT: 0
CHANGE IN BOWEL HABIT: 0

## 2024-07-23 NOTE — PROGRESS NOTES
minutes reviewing previous notes, test results and face to face with the patient discussing the diagnosis and importance of compliance with the treatment plan as well as documenting on the day of the visit.      An electronic signature was used to authenticate this note.    --Esther Elder, VALENTIN - CNP

## 2024-08-01 ENCOUNTER — HOSPITAL ENCOUNTER (OUTPATIENT)
Dept: RADIOLOGY | Facility: EXTERNAL LOCATION | Age: 59
Discharge: HOME | End: 2024-08-01

## 2024-08-01 DIAGNOSIS — M25.571 ACUTE RIGHT ANKLE PAIN: ICD-10-CM

## 2024-08-02 ENCOUNTER — OFFICE VISIT (OUTPATIENT)
Dept: ORTHOPEDIC SURGERY | Facility: CLINIC | Age: 59
End: 2024-08-02
Payer: COMMERCIAL

## 2024-08-02 DIAGNOSIS — S82.831A OTHER CLOSED FRACTURE OF DISTAL END OF RIGHT FIBULA, INITIAL ENCOUNTER: ICD-10-CM

## 2024-08-02 PROCEDURE — 27786 TREATMENT OF ANKLE FRACTURE: CPT | Performed by: STUDENT IN AN ORGANIZED HEALTH CARE EDUCATION/TRAINING PROGRAM

## 2024-08-02 PROCEDURE — 99214 OFFICE O/P EST MOD 30 MIN: CPT | Mod: 57 | Performed by: STUDENT IN AN ORGANIZED HEALTH CARE EDUCATION/TRAINING PROGRAM

## 2024-08-02 NOTE — PROGRESS NOTES
Acute Injury Established Patient Visit    HPI: Miroslava is a 59 y.o.female who presents today with new complaints of right ankle injury.  She states that she had an inversion type injury yesterday.  She stepped onto the concrete and heard a crack when she rolled her ankle.  She notes swelling and bruising over the lateral ankle.  She denies any numbness tingling.  She denies any pain about the knee.    Plan: For this right [place] a distal fibula fracture, we will place her in a walking boot, weightbearing as tolerated, give her a work note to return on Wednesday, and continue meloxicam from home for anti-inflammatory effect in addition to her ice, rest, and elevation.  Follow-up in 3 to 4 weeks with repeat x-rays of the right ankle.    Assessment:   Problem List Items Addressed This Visit    None  Visit Diagnoses       Other closed fracture of distal end of right fibula, initial encounter        Relevant Orders    Walking Boot Tall            Diagnostics: Reviewed      Procedure:  Procedures    Physical Exam:  GENERAL:  No obvious acute distress.  NEURO:  Distally neurovascularly intact.  Sensation intact to light touch.  Extremity: Right ankle exam shows:  Skin is intact;  No erythema or warmth;  Swelling and bruising over the lateral ankle;  No clinical signs of infection;  Tender over the lateral malleolus;  No pain over the medial malleolus;  No pain over the ATF, CF or PTF ligaments;  No pain over the deltoid ligament;  No pain over the Achilles tendon;  Negative Ngo's test;  Negative squeeze test;  Negative anterior drawer test;  Negative talar tilt test;  No pain over the anterior process of the talus;  No pain over the talar dome;  No pain over the base of the fifth metatarsal bone;  No pain over the calcaneus;  No pain over the plantar aponeurosis;  No pain of the midfoot; and  Neurovascularly intact.      Orders Placed This Encounter    Walking Boot Tall      At the conclusion of the visit there  were no further questions by the patient/family regarding their plan of care.  Patient was instructed to call or return with any issues, questions, or concerns regarding their injury and/or treatment plan described above.     08/02/24 at 4:51 PM - Jose Hunter,     Office: [phone]    This note was prepared using voice recognition software.  The details of this note are correct and have been reviewed, and corrected to the best of my ability.  Some grammatical errors may persist related to the Dragon software.

## 2024-08-02 NOTE — LETTER
August 2, 2024     Patient: Miroslava Holbrook   YOB: 1965   Date of Visit: 8/2/2024       To Whom It May Concern:    It is my medical opinion that Miroslava Holbrook may return to work on 8/7/2024 .    If you have any questions or concerns, please don't hesitate to call.         Sincerely,        Jose Hunter,     CC: No Recipients

## 2024-08-30 ENCOUNTER — OFFICE VISIT (OUTPATIENT)
Dept: ORTHOPEDIC SURGERY | Facility: CLINIC | Age: 59
End: 2024-08-30
Payer: COMMERCIAL

## 2024-08-30 ENCOUNTER — HOSPITAL ENCOUNTER (OUTPATIENT)
Dept: RADIOLOGY | Facility: CLINIC | Age: 59
Discharge: HOME | End: 2024-08-30
Payer: COMMERCIAL

## 2024-08-30 DIAGNOSIS — S82.831A OTHER CLOSED FRACTURE OF DISTAL END OF RIGHT FIBULA, INITIAL ENCOUNTER: ICD-10-CM

## 2024-08-30 PROCEDURE — 73610 X-RAY EXAM OF ANKLE: CPT | Mod: RT

## 2024-08-30 PROCEDURE — L1902 AFO ANKLE GAUNTLET PRE OTS: HCPCS | Performed by: STUDENT IN AN ORGANIZED HEALTH CARE EDUCATION/TRAINING PROGRAM

## 2024-08-30 PROCEDURE — 99211 OFF/OP EST MAY X REQ PHY/QHP: CPT | Performed by: STUDENT IN AN ORGANIZED HEALTH CARE EDUCATION/TRAINING PROGRAM

## 2024-08-30 NOTE — PROGRESS NOTES
Acute Injury Established Patient Visit    HPI: Miroslava is a 59 y.o.female who presents today for follow-up of her right [place] a distal fibula fracture.  She is doing better overall.  She states she is 60% better.  She has been in the boot all the time.  She denies any interval falls or injuries.  Swelling and bruising have improved.  She does have some peroneal tendinitis symptoms developing since being in the boot.    On 8/2/2024, she presented with new complaints of right ankle injury.  She states that she had an inversion type injury yesterday.  She stepped onto the concrete and heard a crack when she rolled her ankle.  She notes swelling and bruising over the lateral ankle.  She denies any numbness tingling.  She denies any pain about the knee.    Plan: Today, on 8/30/2024, we will have her start to transition out of her boot into the lace up ankle brace over the next week or so.  She could continue her vitamin D and calcium.  Follow-up in 2 weeks.  No repeat x-rays necessary unless she is not doing better.    On 8-2-24, for this right [place] a distal fibula fracture, we will place her in a walking boot, weightbearing as tolerated, give her a work note to return on Wednesday, and continue meloxicam from home for anti-inflammatory effect in addition to her ice, rest, and elevation.  Follow-up in 3 to 4 weeks with repeat x-rays of the right ankle.    Assessment:   Problem List Items Addressed This Visit    None  Visit Diagnoses       Other closed fracture of distal end of right fibula, initial encounter        Relevant Orders    XR ankle right 3+ views    Ankle Brace, Lace Up or A60            Diagnostics: Reviewed      Procedure:  Procedures    Physical Exam:  GENERAL:  No obvious acute distress.  NEURO:  Distally neurovascularly intact.  Sensation intact to light touch.  Extremity: Right ankle exam shows:  Skin is intact;  No erythema or warmth;  Swelling and bruising over the lateral ankle have vastly  improved;  No clinical signs of infection;  Still mildly tender over the lateral malleolus;  No pain over the medial malleolus;  No pain over the ATF, CF or PTF ligaments;  No pain over the deltoid ligament;  No pain over the Achilles tendon;  Negative Ngo's test;  Negative squeeze test;  Negative anterior drawer test;  Negative talar tilt test;  No pain over the anterior process of the talus;  No pain over the talar dome;  No pain over the base of the fifth metatarsal bone;  No pain over the calcaneus;  No pain over the plantar aponeurosis;  No pain of the midfoot; and  Neurovascularly intact.      Orders Placed This Encounter    Ankle Brace, Lace Up or A60    XR ankle right 3+ views      At the conclusion of the visit there were no further questions by the patient/family regarding their plan of care.  Patient was instructed to call or return with any issues, questions, or concerns regarding their injury and/or treatment plan described above.     08/30/24 at 12:39 PM - Jose Hunter,     Office: [phone]    This note was prepared using voice recognition software.  The details of this note are correct and have been reviewed, and corrected to the best of my ability.  Some grammatical errors may persist related to the Dragon software.

## 2024-09-03 ENCOUNTER — APPOINTMENT (OUTPATIENT)
Dept: ORTHOPEDIC SURGERY | Facility: CLINIC | Age: 59
End: 2024-09-03
Payer: COMMERCIAL

## 2024-09-06 DIAGNOSIS — F41.9 ANXIETY: ICD-10-CM

## 2024-09-06 RX ORDER — DULOXETIN HYDROCHLORIDE 60 MG/1
CAPSULE, DELAYED RELEASE ORAL DAILY
Qty: 90 CAPSULE | Refills: 3 | Status: SHIPPED | OUTPATIENT
Start: 2024-09-06

## 2024-09-06 NOTE — TELEPHONE ENCOUNTER
Future Appointments    Encounter Information   Provider Department Appt Notes   10/17/2024 Esther Elder APRN - CNP [facility] 3 Month Follow Up     Past Visits    Date Provider Specialty Visit Type Primary Dx   07/16/2024 Esther Elder APRN - CNP Family Medicine Office Visit Primary insomnia

## 2024-09-12 ENCOUNTER — OFFICE VISIT (OUTPATIENT)
Dept: ORTHOPEDIC SURGERY | Facility: CLINIC | Age: 59
End: 2024-09-12
Payer: COMMERCIAL

## 2024-09-12 DIAGNOSIS — S82.831A OTHER CLOSED FRACTURE OF DISTAL END OF RIGHT FIBULA, INITIAL ENCOUNTER: Primary | ICD-10-CM

## 2024-09-12 PROCEDURE — 99024 POSTOP FOLLOW-UP VISIT: CPT | Performed by: STUDENT IN AN ORGANIZED HEALTH CARE EDUCATION/TRAINING PROGRAM

## 2024-09-12 NOTE — PROGRESS NOTES
Acute Injury Established Patient Visit    HPI: Miroslava is a 59 y.o.female who presents today for follow-up of her right Reyes a distal fibula fracture.  She states that overall she is still doing better.  She still has some pain into the lateral ankle and some swelling.  She denies any interval falls or injuries.  She states that a couple of days ago she had to go back into the boot due to the pain.  She has been trying to transition into the lace up ankle brace.  She continues to take her vitamin D and calcium.    On 8/30/2024, she presented for follow-up of her right Reyes a distal fibula fracture.  She is doing better overall.  She states she is 60% better.  She has been in the boot all the time.  She denies any interval falls or injuries.  Swelling and bruising have improved.  She does have some peroneal tendinitis symptoms developing since being in the boot.    On 8/2/2024, she presented with new complaints of right ankle injury.  She states that she had an inversion type injury yesterday.  She stepped onto the concrete and heard a crack when she rolled her ankle.  She notes swelling and bruising over the lateral ankle.  She denies any numbness tingling.  She denies any pain about the knee.    Plan: Today, we will have her return to the boot as needed over the next several days to a week to settle down some of her swelling and pain, but can then begin to transition back into the lace up ankle brace, and begin to return to activities as tolerated.  She is 6 weeks out today.  Reviewed x-rays from last time which showed good interval healing with no change in alignment.  No repeat x-rays necessary today.  We will have her follow-up in 2 weeks to reevaluate.  No repeat x-rays at that time either unless she continues to have increasing pain.    On 8/30/2024, we will have her start to transition out of her boot into the lace up ankle brace over the next week or so.  She could continue her vitamin D and calcium.   Follow-up in 2 weeks.  No repeat x-rays necessary unless she is not doing better.    On 8-2-24, for this right Elsa a distal fibula fracture, we will place her in a walking boot, weightbearing as tolerated, give her a work note to return on Wednesday, and continue meloxicam from home for anti-inflammatory effect in addition to her ice, rest, and elevation.  Follow-up in 3 to 4 weeks with repeat x-rays of the right ankle.    Assessment:   Problem List Items Addressed This Visit    None  Visit Diagnoses       Other closed fracture of distal end of right fibula, initial encounter    -  Primary              Diagnostics: Reviewed      Procedure:  Procedures    Physical Exam:  GENERAL:  No obvious acute distress.  NEURO:  Distally neurovascularly intact.  Sensation intact to light touch.  Extremity: Right ankle exam shows:  Skin is intact;  No erythema or warmth;  Swelling and bruising over the lateral ankle have vastly improved;  No clinical signs of infection;  TENDER over the lateral malleolus;  No pain over the medial malleolus;  TENDER over the ATF, but not the CF or PTF ligaments;  No pain over the deltoid ligament;  No pain over the Achilles tendon;  Negative Ngo's test;  Negative squeeze test;  Negative anterior drawer test;  Negative talar tilt test;  No pain over the anterior process of the talus;  No pain over the talar dome;  No pain over the base of the fifth metatarsal bone;  No pain over the calcaneus;  No pain over the plantar aponeurosis;  No pain of the midfoot; and  Neurovascularly intact.      No orders of the defined types were placed in this encounter.     At the conclusion of the visit there were no further questions by the patient/family regarding their plan of care.  Patient was instructed to call or return with any issues, questions, or concerns regarding their injury and/or treatment plan described above.     09/12/24 at 1:28 PM - Jose Hunter DO    Office: [phone]    This  note was prepared using voice recognition software.  The details of this note are correct and have been reviewed, and corrected to the best of my ability.  Some grammatical errors may persist related to the Dragon software.

## 2024-09-13 ENCOUNTER — TELEPHONE (OUTPATIENT)
Dept: FAMILY MEDICINE CLINIC | Age: 59
End: 2024-09-13

## 2024-09-13 ENCOUNTER — APPOINTMENT (OUTPATIENT)
Dept: ORTHOPEDIC SURGERY | Facility: CLINIC | Age: 59
End: 2024-09-13
Payer: COMMERCIAL

## 2024-10-01 ENCOUNTER — APPOINTMENT (OUTPATIENT)
Dept: ORTHOPEDIC SURGERY | Facility: CLINIC | Age: 59
End: 2024-10-01
Payer: COMMERCIAL

## 2024-10-01 DIAGNOSIS — M76.71 PERONEAL TENDINITIS OF RIGHT LOWER EXTREMITY: ICD-10-CM

## 2024-10-01 DIAGNOSIS — S82.831A OTHER CLOSED FRACTURE OF DISTAL END OF RIGHT FIBULA, INITIAL ENCOUNTER: Primary | ICD-10-CM

## 2024-10-01 PROCEDURE — 99213 OFFICE O/P EST LOW 20 MIN: CPT | Performed by: STUDENT IN AN ORGANIZED HEALTH CARE EDUCATION/TRAINING PROGRAM

## 2024-10-01 NOTE — PROGRESS NOTES
Acute Injury Established Patient Visit    HPI: Miroslava is a 59 y.o.female who presents today for follow-up of her right Reyes a distal fibula fracture.  She is been weaning out of the boot and the lace up ankle brace.  She states that she is feeling about 80% better.  She does still have some pain into the peroneal tendons.  She has some tenderness overlying the distal fibula when she touches it.  She denies any interval falls or injuries.  She still has some swelling, but no bruising.    On 9/12/2024, she presented for follow-up of her right Reyes a distal fibula fracture.  She states that overall she is still doing better.  She still has some pain into the lateral ankle and some swelling.  She denies any interval falls or injuries.  She states that a couple of days ago she had to go back into the boot due to the pain.  She has been trying to transition into the lace up ankle brace.  She continues to take her vitamin D and calcium.    On 8/30/2024, she presented for follow-up of her right Reyes a distal fibula fracture.  She is doing better overall.  She states she is 60% better.  She has been in the boot all the time.  She denies any interval falls or injuries.  Swelling and bruising have improved.  She does have some peroneal tendinitis symptoms developing since being in the boot.    On 8/2/2024, she presented with new complaints of right ankle injury.  She states that she had an inversion type injury yesterday.  She stepped onto the concrete and heard a crack when she rolled her ankle.  She notes swelling and bruising over the lateral ankle.  She denies any numbness tingling.  She denies any pain about the knee.    Plan: Today, on 10/1/2024, we will have her continue to wean out of her lace up ankle brace, start some physical therapy for her peroneal tendinitis, and continue with icing and rest as needed.  She should continue to improve going forward here as she has made some great improvement over the past 3  weeks or so.  Follow-up in 3 to 4 weeks.    On 9/12/2024, we will have her return to the boot as needed over the next several days to a week to settle down some of her swelling and pain, but can then begin to transition back into the lace up ankle brace, and begin to return to activities as tolerated.  She is 6 weeks out today.  Reviewed x-rays from last time which showed good interval healing with no change in alignment.  No repeat x-rays necessary today.  We will have her follow-up in 2 weeks to reevaluate.  No repeat x-rays at that time either unless she continues to have increasing pain.    On 8/30/2024, we will have her start to transition out of her boot into the lace up ankle brace over the next week or so.  She could continue her vitamin D and calcium.  Follow-up in 2 weeks.  No repeat x-rays necessary unless she is not doing better.    On 8-2-24, for this right [place] a distal fibula fracture, we will place her in a walking boot, weightbearing as tolerated, give her a work note to return on Wednesday, and continue meloxicam from home for anti-inflammatory effect in addition to her ice, rest, and elevation.  Follow-up in 3 to 4 weeks with repeat x-rays of the right ankle.    Assessment:   Problem List Items Addressed This Visit    None  Visit Diagnoses       Other closed fracture of distal end of right fibula, initial encounter    -  Primary    Peroneal tendinitis of right lower extremity                    Diagnostics: Reviewed      Procedure:  Procedures    Physical Exam:  GENERAL:  No obvious acute distress.  NEURO:  Distally neurovascularly intact.  Sensation intact to light touch.  Extremity: Right ankle exam shows:  Skin is intact;  No erythema or warmth;  Minimal swelling and no bruising over the lateral ankle;  No clinical signs of infection;  Mildly TENDER over the lateral malleolus;  TENDER over the distal peroneal tendons posterior to the lateral malleolus;  No pain over the medial malleolus;  No  pain over the ATF, CF or PTF ligaments;  No pain over the deltoid ligament;  No pain over the Achilles tendon;  Negative Ngo's test;  Negative squeeze test;  Negative anterior drawer test;  Negative talar tilt test;  No pain over the anterior process of the talus;  No pain over the talar dome;  No pain over the base of the fifth metatarsal bone;  No pain over the calcaneus;  No pain over the plantar aponeurosis;  No pain of the midfoot; and  Neurovascularly intact.      No orders of the defined types were placed in this encounter.     At the conclusion of the visit there were no further questions by the patient/family regarding their plan of care.  Patient was instructed to call or return with any issues, questions, or concerns regarding their injury and/or treatment plan described above.     10/01/24 at 10:19 AM - Jose Hunter DO    Office: [phone]    This note was prepared using voice recognition software.  The details of this note are correct and have been reviewed, and corrected to the best of my ability.  Some grammatical errors may persist related to the Dragon software.

## 2024-10-04 ENCOUNTER — EVALUATION (OUTPATIENT)
Dept: PHYSICAL THERAPY | Facility: CLINIC | Age: 59
End: 2024-10-04
Payer: COMMERCIAL

## 2024-10-04 DIAGNOSIS — M76.71 PERONEAL TENDINITIS OF LOWER LEG, RIGHT: Primary | ICD-10-CM

## 2024-10-04 PROCEDURE — 97110 THERAPEUTIC EXERCISES: CPT | Mod: GP

## 2024-10-04 PROCEDURE — 97161 PT EVAL LOW COMPLEX 20 MIN: CPT | Mod: GP

## 2024-10-04 ASSESSMENT — ENCOUNTER SYMPTOMS
DEPRESSION: 0
LOSS OF SENSATION IN FEET: 0
OCCASIONAL FEELINGS OF UNSTEADINESS: 1

## 2024-10-04 NOTE — PROGRESS NOTES
Physical Therapy Evaluation    Patient Name: Miroslava Holbrook  MRN: [id number]  Time Calculation  Start Time: 0832  Stop Time: 0910  Time Calculation (min): 38 min  PT Evaluation Time Entry  PT Evaluation (Low) Time Entry: 10  PT Therapeutic Procedures Time Entry  Therapeutic Exercise Time Entry: 28                   Current Problem  1. Peroneal tendinitis of lower leg, right  Follow Up In Physical Therapy        Insurance    Insurance reviewed   Visit number: 1  Approved number of visits: TBD  [employer]- waiting on clearance to see # of visits       Subjective   General:  Pt reports to the clinic with complaint of R ankle pain. Sx started on 8/1/2024 when she was watering flowers and stepped on edge of sidewalk and heard cracks. XR on 8/30 showed healing R Reyes a distal fibular fracture. She did fall a few times when walking with crutches back in August. Dr. Hunter did have her in a boot then progressed her to an ankle brace, but she has not worn it in 2 weeks d/t swelling.     Occupation:      Lifestyle: walking, biking     Living Environment: live alone, no stairs, 3 JUJU 2 HR       Precautions:    Pain:  Description of Symptoms:    Pain: throbbing and burning   Location: lateral malleoli and peroneal  Severity: best/currently: 7/10   At worst: 9/10   Intensity Variation: increases with walking and donning shoes, STS   Duration of Pain: constant   Aggravating Factors:  increases with walking and donning shoes, STS, descending stairs   Relieving Factors: tylenol, compression, ice   Associated Symptoms: swelling around ankle, no numbness/tingling   Functional Limitations: can't workout, in constant pain when at work, gaining weight     Reviewed medical screening form with pt and medical screening assessed    Imaging:   XR on 8/30 showed healing R Reyes a distal fibular fracture    Objective   Foot/Ankle Musculoskeletal Exam  Gait    Antalgic: right    Palpation    Right      Crepitus: none        Tenderness:  present          Sinus tarsi: moderate          Anterior syndesmosis: moderate          Anterior medial joint line: moderate          Anterior lateral joint line: moderate          Distal fibula: moderate          Medial malleolus: moderate      Range of Motion    Right      Right foot/ankle range of motion is normal and full.      Range of motion additional comments: All motions painful     Strength    Right      Tibialis anterior: 4+/5. Tibialis anterior is affected by pain.       Extensor hallucis longus: 4/5. Extensor hallucis longus is affected by pain.       Flexor hallucis longus: 4/5. Flexor hallucis longus is affected by pain.       Gastroc/soleus: 5/5.       Peroneals: 4-/5. Peroneals are affected by pain.     Left      Left foot/ankle strength is normal.     Special Tests    Right      Single heel rise: positive and with pain        Double heel rise: positive and with pain          Outcome Measures:  Other Measures  Lower Extremity Funtional Score (LEFS): 18     Treatment:     EDUCATION/HEP:  Access Code: [email or web address]  URL: [email or web address]  Date: 10/04/2024  Prepared by: Rina Uribe    Exercises  - Long Sitting Ankle Eversion with Resistance  - 1 x daily - 4 x weekly - 2 sets - 10 reps  - Long Sitting Ankle Plantar Flexion with Resistance  - 1 x daily - 4 x weekly - 2 sets - 10 reps  - Long Sitting Ankle Dorsiflexion with Anchored Resistance  - 1 x daily - 4 x weekly - 2 sets - 10 reps  - Long Sitting Ankle Inversion with Resistance  - 1 x daily - 4 x weekly - 2 sets - 10 reps  - Seated Arch Lifts  - 1 x daily - 4 x weekly - 2 sets - 10 reps  - Seated Toe Raise  - 1 x daily - 4 x weekly - 2 sets - 10 reps  - Seated Heel Raise  - 1 x daily - 4 x weekly - 2 sets - 10 reps    Assessment:  Pt reports to the clinic with complaint of R ankle pain. Sx started on 8/1/2024 when she was watering flowers and stepped on edge of sidewalk and heard cracks. XR on 8/30 showed healing R Reyes  a distal fibular fracture. She did fall a few times when walking with crutches back in August. Dr. Hunter did have her in a boot then progressed her to an ankle brace, but she has not worn it in 2 weeks d/t swelling. She was able to tolerate all exercises with some increase in discomfort. Pt was educated on HEP     Clinical Presentation: Stable    Level of Complexity: Low    Goals:  Pt will be independent with advanced HEP   Pt will increase R ankle strength to 5/5 to improve stability with all functional mobility  Pt will demo R LE SLS >/=10 sec without UE assist on compliant surface for functional carryover into dynamic balance  Pt will negotiate flight of stairs mod I reciprocally without increased ankle pain  Pt will ambulate community distance independent over varying surfaces/inclines without increased R ankle pain or instability over varying surfaces/inclines   Pt will increase LEFS score by at least 9 points (MCID) to indicate significant improvement in function.      Plan  1x/week for 4 visits     Skilled therapeutic intervention to address the above mentioned physical and functional impairments and limitations including, but not limited to: patient education, therapeutic exercise, therapeutic activity, manual therapy, body mechanics training, dry needling, blood flow restriction training, instrumented soft tissue mobilization, manual soft tissue mobilization, gait retraining, biofeedback, cryotherapy, electrical stimulation, home program development, hot pack, taping, neuromuscular re-education, self-care/home management, and vasopneumatic compression.

## 2024-10-05 DIAGNOSIS — K21.9 GASTROESOPHAGEAL REFLUX DISEASE: ICD-10-CM

## 2024-10-09 ENCOUNTER — TREATMENT (OUTPATIENT)
Dept: PHYSICAL THERAPY | Facility: CLINIC | Age: 59
End: 2024-10-09
Payer: COMMERCIAL

## 2024-10-09 DIAGNOSIS — M76.71 PERONEAL TENDINITIS OF LOWER LEG, RIGHT: ICD-10-CM

## 2024-10-09 PROCEDURE — 97110 THERAPEUTIC EXERCISES: CPT | Mod: GP,CQ

## 2024-10-09 PROCEDURE — 97140 MANUAL THERAPY 1/> REGIONS: CPT | Mod: GP,CQ

## 2024-10-09 ASSESSMENT — PAIN - FUNCTIONAL ASSESSMENT: PAIN_FUNCTIONAL_ASSESSMENT: 0-10

## 2024-10-09 NOTE — PROGRESS NOTES
"Physical Therapy Treatment    Patient Name: Miroslava Holbrook  MRN: [id number]  Today's Date: 10/9/2024  Time Calculation  Start Time: 0115  Stop Time: 0155  Time Calculation (min): 40 min  PT Therapeutic Procedures Time Entry  Manual Therapy Time Entry: 23  Therapeutic Exercise Time Entry: 15       Current Problem  1. Peroneal tendinitis of lower leg, right  Follow Up In Physical Therapy          Subjective   General   Pt still with some soreness lateral ankle as well as medial line.   Insurance   [employer]  Visits 20  Visit 2  Precautions     Pain  Pain Assessment: 0-10    Objective   Treatments:  Seated HR/TR x20  Towel wipers x20  Towel scrunches x20  Ankle 4-way   SLS 10\" x10  Wobble board f/l x20    Manual  IASTM  Grade 1 Talor mobs  PROM  RockTape   Assessment   Almost full resolution familiar medial ankle pain but still with lateral joint line pain, was reduced. Reduction familiar pain with improved mobility end of manual. Applied Rocktape for pain reduction. Added several ROM and intrinsic/extrinsic strengthening ex's to progress strength and stability. Limited in tolerance overall, but with some improvement over eval.   Plan:  Cont to progress strength and endurance     OP EDUCATION:       Goals:     "

## 2024-10-17 ENCOUNTER — OFFICE VISIT (OUTPATIENT)
Dept: FAMILY MEDICINE CLINIC | Age: 59
End: 2024-10-17
Payer: COMMERCIAL

## 2024-10-17 VITALS
DIASTOLIC BLOOD PRESSURE: 78 MMHG | BODY MASS INDEX: 29.84 KG/M2 | HEART RATE: 102 BPM | HEIGHT: 59 IN | SYSTOLIC BLOOD PRESSURE: 128 MMHG | WEIGHT: 148 LBS

## 2024-10-17 DIAGNOSIS — G47.00 INSOMNIA, UNSPECIFIED TYPE: Primary | ICD-10-CM

## 2024-10-17 DIAGNOSIS — F51.01 PRIMARY INSOMNIA: ICD-10-CM

## 2024-10-17 DIAGNOSIS — K21.9 GASTROESOPHAGEAL REFLUX DISEASE, UNSPECIFIED WHETHER ESOPHAGITIS PRESENT: ICD-10-CM

## 2024-10-17 DIAGNOSIS — Z23 FLU VACCINE NEED: ICD-10-CM

## 2024-10-17 DIAGNOSIS — F41.9 ANXIETY: ICD-10-CM

## 2024-10-17 DIAGNOSIS — E03.9 ACQUIRED HYPOTHYROIDISM: ICD-10-CM

## 2024-10-17 PROCEDURE — 90661 CCIIV3 VAC ABX FR 0.5 ML IM: CPT | Performed by: NURSE PRACTITIONER

## 2024-10-17 PROCEDURE — 99214 OFFICE O/P EST MOD 30 MIN: CPT | Performed by: NURSE PRACTITIONER

## 2024-10-17 PROCEDURE — 90471 IMMUNIZATION ADMIN: CPT | Performed by: NURSE PRACTITIONER

## 2024-10-17 RX ORDER — ALPRAZOLAM 1 MG/1
1 TABLET ORAL NIGHTLY PRN
Qty: 90 TABLET | Refills: 0 | Status: SHIPPED | OUTPATIENT
Start: 2024-10-17 | End: 2025-01-15

## 2024-10-17 RX ORDER — ALPRAZOLAM 1 MG/1
1 TABLET, EXTENDED RELEASE ORAL NIGHTLY
Qty: 90 TABLET | Refills: 0 | Status: CANCELLED | OUTPATIENT
Start: 2024-10-17 | End: 2025-01-15

## 2024-10-18 ENCOUNTER — TREATMENT (OUTPATIENT)
Dept: PHYSICAL THERAPY | Facility: CLINIC | Age: 59
End: 2024-10-18
Payer: COMMERCIAL

## 2024-10-18 DIAGNOSIS — M76.71 PERONEAL TENDINITIS OF LOWER LEG, RIGHT: ICD-10-CM

## 2024-10-18 PROCEDURE — 97110 THERAPEUTIC EXERCISES: CPT | Mod: GP

## 2024-10-18 PROCEDURE — 97140 MANUAL THERAPY 1/> REGIONS: CPT | Mod: GP

## 2024-10-18 NOTE — PROGRESS NOTES
Physical Therapy Treatment    Patient Name: Miroslava Holbrook  MRN: [id number]  Time Calculation  Start Time: 1015  Stop Time: 1053  Time Calculation (min): 38 min     PT Therapeutic Procedures Time Entry  Manual Therapy Time Entry: 10  Therapeutic Exercise Time Entry: 28                   Current Problem  1. Peroneal tendinitis of lower leg, right  Follow Up In Physical Therapy      Insurance   [employer]  Visits 20  Visit 2/4    Subjective   General  Pt presents with continuing RLE Sx. She has some increased pain today due to walking a lot yesterday.     Pain  5/10    Objective     Treatments:  4 way ankle 2 x 10, green TB   Lunge for ankle DF with posterior strap mobilization   Seated HR/TR 3 x 10   Towel wipers x20  Towel scrunches x20  Doming 2 x 10   Tib/fib rotation with towel x 10  Wobble board f/l x20     Manual  IASTM  Grade 1 Talor mobs  PROM  RockTape     Assessment   Pt presents with continuing RLE Sx. She has some increased pain today due to walking a lot yesterday. Pt has pain in joint line with PF and mobilizations, but overall able to decrease pain after manual.     Plan   Continue current POC, progress as tolerated

## 2024-10-22 ENCOUNTER — HOSPITAL ENCOUNTER (OUTPATIENT)
Dept: RADIOLOGY | Facility: HOSPITAL | Age: 59
Discharge: HOME | End: 2024-10-22
Payer: COMMERCIAL

## 2024-10-22 ENCOUNTER — APPOINTMENT (OUTPATIENT)
Dept: ORTHOPEDIC SURGERY | Facility: CLINIC | Age: 59
End: 2024-10-22
Payer: COMMERCIAL

## 2024-10-22 DIAGNOSIS — M76.71 PERONEAL TENDINITIS OF RIGHT LOWER EXTREMITY: ICD-10-CM

## 2024-10-22 DIAGNOSIS — S82.831A OTHER CLOSED FRACTURE OF DISTAL END OF RIGHT FIBULA, INITIAL ENCOUNTER: ICD-10-CM

## 2024-10-22 DIAGNOSIS — M25.571 ACUTE RIGHT ANKLE PAIN: Primary | ICD-10-CM

## 2024-10-22 DIAGNOSIS — M25.571 ACUTE RIGHT ANKLE PAIN: ICD-10-CM

## 2024-10-22 PROCEDURE — 73590 X-RAY EXAM OF LOWER LEG: CPT | Mod: RT

## 2024-10-22 PROCEDURE — 99024 POSTOP FOLLOW-UP VISIT: CPT | Performed by: STUDENT IN AN ORGANIZED HEALTH CARE EDUCATION/TRAINING PROGRAM

## 2024-10-22 PROCEDURE — 73590 X-RAY EXAM OF LOWER LEG: CPT | Mod: RIGHT SIDE | Performed by: RADIOLOGY

## 2024-10-22 NOTE — PROGRESS NOTES
Acute Injury Established Patient Visit    HPI: Miroslava is a 59 y.o.female who presents today for follow-up of her right Reyes a distal fibula fracture and peroneal tendinitis.  She has been doing some physical therapy.  She states that she is still having some pain laterally that radiates up towards the knee.  She states that she is getting better, but the pain is still there.  She denies any interval falls or injuries.    On 10/1/2024, she presented for follow-up of her right Reyes a distal fibula fracture.  She is been weaning out of the boot and the lace up ankle brace.  She states that she is feeling about 80% better.  She does still have some pain into the peroneal tendons.  She has some tenderness overlying the distal fibula when she touches it.  She denies any interval falls or injuries.  She still has some swelling, but no bruising.    On 9/12/2024, she presented for follow-up of her right Reyes a distal fibula fracture.  She states that overall she is still doing better.  She still has some pain into the lateral ankle and some swelling.  She denies any interval falls or injuries.  She states that a couple of days ago she had to go back into the boot due to the pain.  She has been trying to transition into the lace up ankle brace.  She continues to take her vitamin D and calcium.    On 8/30/2024, she presented for follow-up of her right Reyes a distal fibula fracture.  She is doing better overall.  She states she is 60% better.  She has been in the boot all the time.  She denies any interval falls or injuries.  Swelling and bruising have improved.  She does have some peroneal tendinitis symptoms developing since being in the boot.    On 8/2/2024, she presented with new complaints of right ankle injury.  She states that she had an inversion type injury yesterday.  She stepped onto the concrete and heard a crack when she rolled her ankle.  She notes swelling and bruising over the lateral ankle.  She denies  any numbness tingling.  She denies any pain about the knee.    Plan: Today, on 10/22/2024, we will have her return to her lace up ankle brace for some further support given x-ray evidence that there is incomplete healing of her distal fibula fracture.  There is no new obvious fracture or dislocation.  We will have her start a metabolic workup with her primary care physician.  She states that she does not smoke.  She has been taking her vitamin D and calcium.  Do not think she is a candidate for a bone stimulator at this point, but this may become an option in the future.  Follow-up in 3 weeks with repeat x-rays of the right ankle.  She is not better at that time, we will review her metabolic workup, consider a bone stimulator, and possible further imaging.    On 10/1/2024, we will have her continue to wean out of her lace up ankle brace, start some physical therapy for her peroneal tendinitis, and continue with icing and rest as needed.  She should continue to improve going forward here as she has made some great improvement over the past 3 weeks or so.  Follow-up in 3 to 4 weeks.    On 9/12/2024, we will have her return to the boot as needed over the next several days to a week to settle down some of her swelling and pain, but can then begin to transition back into the lace up ankle brace, and begin to return to activities as tolerated.  She is 6 weeks out today.  Reviewed x-rays from last time which showed good interval healing with no change in alignment.  No repeat x-rays necessary today.  We will have her follow-up in 2 weeks to reevaluate.  No repeat x-rays at that time either unless she continues to have increasing pain.    On 8/30/2024, we will have her start to transition out of her boot into the lace up ankle brace over the next week or so.  She could continue her vitamin D and calcium.  Follow-up in 2 weeks.  No repeat x-rays necessary unless she is not doing better.    On 8-2-24, for this right [place]  distal fibula fracture, we will place her in a walking boot, weightbearing as tolerated, give her a work note to return on Wednesday, and continue meloxicam from home for anti-inflammatory effect in addition to her ice, rest, and elevation.  Follow-up in 3 to 4 weeks with repeat x-rays of the right ankle.    Assessment:   Problem List Items Addressed This Visit    None  Visit Diagnoses       Acute right ankle pain    -  Primary    Relevant Orders    XR tibia fibula right 2 views    Peroneal tendinitis of right lower extremity        Other closed fracture of distal end of right fibula, initial encounter                      Diagnostics: Reviewed      Procedure:  Procedures    Physical Exam:  GENERAL:  No obvious acute distress.  NEURO:  Distally neurovascularly intact.  Sensation intact to light touch.  Extremity: Right ankle exam shows:  Skin is intact;  No erythema or warmth;  Minimal swelling and no bruising over the lateral ankle;  No clinical signs of infection;  Still mildly TENDER over the lateral malleolus;   over the distal peroneal tendons posterior to the lateral malleolus;  No pain over the medial malleolus;  No pain over the ATF, CF or PTF ligaments;  No pain over the deltoid ligament;  No pain over the Achilles tendon;  Negative Ngo's test;  Negative squeeze test;  Negative anterior drawer test;  Negative talar tilt test;  No pain over the anterior process of the talus;  No pain over the talar dome;  No pain over the base of the fifth metatarsal bone;  No pain over the calcaneus;  No pain over the plantar aponeurosis;  No pain of the midfoot; and  Neurovascularly intact.      Orders Placed This Encounter    XR tibia fibula right 2 views      At the conclusion of the visit there were no further questions by the patient/family regarding their plan of care.  Patient was instructed to call or return with any issues, questions, or concerns regarding their injury and/or treatment plan  described above.     10/22/24 at 11:42 AM - Jose Hunter, DO    Office: [phone]    This note was prepared using voice recognition software.  The details of this note are correct and have been reviewed, and corrected to the best of my ability.  Some grammatical errors may persist related to the Dragon software.

## 2024-10-23 ASSESSMENT — ENCOUNTER SYMPTOMS
VOMITING: 0
STRIDOR: 0
COUGH: 0
GLOBUS SENSATION: 0
WATER BRASH: 0
WHEEZING: 0
BELCHING: 0
CHOKING: 0
NAUSEA: 0
VISUAL CHANGE: 0
BACK PAIN: 0
CHANGE IN BOWEL HABIT: 0
ABDOMINAL PAIN: 0
HOARSE VOICE: 0
SORE THROAT: 0
HEARTBURN: 0
SWOLLEN GLANDS: 0

## 2024-10-24 NOTE — PROGRESS NOTES
Kathy Izquierdo (:  1965) is a 59 y.o. female,Established patient, here for evaluation of the following chief complaint(s):  3 Month Follow-Up, Hypothyroidism, and Insomnia      ASSESSMENT/PLAN:  1. Insomnia, unspecified type  -     ALPRAZolam (XANAX) 1 MG tablet; Take 1 tablet by mouth nightly as needed for Sleep for up to 90 days. Max Daily Amount: 1 mg, Disp-90 tablet, R-0Normal  2. Gastroesophageal reflux disease, unspecified whether esophagitis present  -     omeprazole (PRILOSEC) 20 MG delayed release capsule; TAKE ONE CAPSULE ONE TIME DAILY, Disp-90 capsule, R-3Normal  3. Anxiety  4. Flu vaccine need  5. Primary insomnia  6. Acquired hypothyroidism      Return in about 3 months (around 2025) for physical.    SUBJECTIVE/OBJECTIVE:  RLS:  controlled    Hypothyroidism: Patient presents for evaluation of thyroid function. Symptoms consist of denies fatigue, weight changes, heat/cold intolerance, bowel/skin changes or CVS symptoms. The symptoms are none.  The problem has been controlled.  Previous thyroid studies include TSH. The hypothyroidism is due to hypothyroidism and Hashimoto's disease    Gastroesophageal Reflux  She reports no abdominal pain, no belching, no chest pain, no choking, no coughing, no dysphagia, no early satiety, no globus sensation, no heartburn, no hoarse voice, no nausea, no sore throat, no stridor, no tooth decay, no water brash or no wheezing. This is a new (over the past 3 months) problem. The current episode started more than 1 month ago. The problem occurs constantly (worse at night). The problem has been unchanged. The heartburn duration is several minutes. The heartburn is located in the abdomen and substernum. The heartburn is of moderate intensity. The heartburn wakes her from sleep. The heartburn does not limit her activity. The heartburn changes (worse with laying laying flat ) with position. The symptoms are aggravated by lying down, caffeine and certain foods.

## 2024-10-25 ENCOUNTER — TREATMENT (OUTPATIENT)
Dept: PHYSICAL THERAPY | Facility: CLINIC | Age: 59
End: 2024-10-25
Payer: COMMERCIAL

## 2024-10-25 DIAGNOSIS — M76.71 PERONEAL TENDINITIS OF LOWER LEG, RIGHT: Primary | ICD-10-CM

## 2024-10-25 PROCEDURE — 97110 THERAPEUTIC EXERCISES: CPT | Mod: GP

## 2024-10-25 PROCEDURE — 97164 PT RE-EVAL EST PLAN CARE: CPT | Mod: GP

## 2024-10-25 NOTE — PROGRESS NOTES
Physical Therapy Treatment    Patient Name: Miroslava Holbrook  MRN: [id number]  Time Calculation  Start Time: 0740  Stop Time: 0818  Time Calculation (min): 38 min  PT Evaluation Time Entry  PT Re-Evaluation Time Entry: 10  PT Therapeutic Procedures Time Entry  Therapeutic Exercise Time Entry: 28                   Current Problem  1. Peroneal tendinitis of lower leg, right          Insurance   [employer]  Visits 20  Visit 3/4    Subjective   General  Pt presents to the clinic with c/o R ankle pain. Sx started on 8/1/2024 when she was watering flowers and stepped on edge of sidewalk and heard cracks. XR on 8/30 showed healing R Reyes a distal fibular fracture with Dr. Hunter on 10/22/24. He has advised her to begin wearing her lace up brace again.     Precautions  Fracture is not healing at repeat XR     Imaging  XR on 10/22/24: No change in the appearance of the distal fibular fracture when compared with the study of 08/30/2024.    Pain  0/10 current/best   7/10 at the end of day     Objective   Gait    Antalgic: right in lateral joint line      Palpation    Right      Crepitus: none        Tenderness: present          Sinus tarsi:         Anterior syndesmosis:         Anterior medial joint line:         Anterior lateral joint line: moderate          Distal fibula: moderate          Medial malleolus:      Range of Motion    Right      Right foot/ankle range of motion is normal and full.      Range of motion additional comments: All motions painful      Strength    Right      Tibialis anterior: 5/5. Affected by pain       Extensor hallucis longus: 5/5.       Flexor hallucis longus: 5/5.       Gastroc/soleus: 5/5.       Peroneals: 5/5. Peroneals are affected by pain.     Left      Left foot/ankle strength is normal.      Special Tests    Right      Single heel rise: positive and with pain        Double heel rise: positive and with pain       Outcome Measures:  Other Measures  Lower Extremity Funtional Score (LEFS):  18    Treatments:  Manual:  IASTM  Grade 1 Talor mobs-holding mobilizations d/t recent XR   PROM  RockTape    4 way ankle 3 x 12, green TB   Doming 3 x 12   Great toe extension 2 x 12   Seated HR/TR 3 x 10   Wobble board f/l x20   Gastroc/soleus  stretch 2 x 30s   SLS and WB on blue foam 5 x 10s       Assessment   Pt presents to the clinic with c/o R ankle pain. Sx started on 8/1/2024 when she was watering flowers and stepped on edge of sidewalk and heard cracks. XR on 8/30 showed healing R Reyes a distal fibular fracture with Dr. Hunter on 10/22/24. He has advised her to begin wearing her lace up brace again. Ankle mobilizations were held at today's visit d/t recent follow up XR on 10/22/24 with Dr. Hunter showing no change in the appearance of the distal fibular fracture when compared with the study of 08/30/2024. He would also like Miroslava to start wearing her lace up brace again. Pt overall strength has improved since last visit but she is still TTP in lateral joint and painful in same area during walking, PF, and eversion. She does need some cuing to keep knee from moving during rocker board and 4 way ankle. Pt will decrease treatment to 1x a week, every other week to allow for healing of distal fibula fx at this time. Pt stands to benefit from skilled PT in order to increase ankle stability,balance, and endurance in order to return to PLOF.     Plan   Continue current POC, progress as tolerated

## 2024-11-01 ENCOUNTER — APPOINTMENT (OUTPATIENT)
Dept: ORTHOPEDIC SURGERY | Facility: CLINIC | Age: 59
End: 2024-11-01
Payer: COMMERCIAL

## 2024-11-06 NOTE — TELEPHONE ENCOUNTER
Pharmacy requesting medication refill. Please approve or deny this request.    Rx requested:  Requested Prescriptions     Pending Prescriptions Disp Refills    levothyroxine (SYNTHROID) 50 MCG tablet [Pharmacy Med Name: LEVOTHYROXINE 50 MCG TABLET] 90 tablet 0     Sig: TAKE 1 TABLET BY MOUTH EVERY DAY         Last Office Visit:   10/17/24    Next Visit Date:  Future Appointments   Date Time Provider Department Center   1/20/2025  2:30 PM Esther Elder, APRN - CNP [facility] DE[facility]

## 2024-11-08 ENCOUNTER — TREATMENT (OUTPATIENT)
Dept: PHYSICAL THERAPY | Facility: CLINIC | Age: 59
End: 2024-11-08
Payer: COMMERCIAL

## 2024-11-08 DIAGNOSIS — M76.71 PERONEAL TENDINITIS OF LOWER LEG, RIGHT: ICD-10-CM

## 2024-11-08 PROCEDURE — 97140 MANUAL THERAPY 1/> REGIONS: CPT | Mod: GP,CQ

## 2024-11-08 PROCEDURE — 97110 THERAPEUTIC EXERCISES: CPT | Mod: GP,CQ

## 2024-11-08 RX ORDER — LEVOTHYROXINE SODIUM 50 UG/1
50 TABLET ORAL DAILY
Qty: 90 TABLET | Refills: 0 | Status: SHIPPED | OUTPATIENT
Start: 2024-11-08

## 2024-11-08 ASSESSMENT — PAIN SCALES - GENERAL: PAINLEVEL_OUTOF10: 2

## 2024-11-08 ASSESSMENT — PAIN - FUNCTIONAL ASSESSMENT: PAIN_FUNCTIONAL_ASSESSMENT: 0-10

## 2024-11-08 ASSESSMENT — PAIN DESCRIPTION - DESCRIPTORS: DESCRIPTORS: ACHING;SORE

## 2024-11-08 NOTE — PROGRESS NOTES
Physical Therapy Treatment    Patient Name: Miroslava Holbrook  MRN: [id number]  Today's Date: 11/8/2024  Time Calculation  Start Time: 0745  Stop Time: 0825  Time Calculation (min): 40 min  PT Therapeutic Procedures Time Entry  Manual Therapy Time Entry: 23  Therapeutic Exercise Time Entry: 15       Current Problem  1. Peroneal tendinitis of lower leg, right  Follow Up In Physical Therapy          Subjective   General   Pt without too much change familiar symptoms to this point.   Insurance   [employer]  Visits 20  Visit 3/4  Precautions   Fibular fx, non-healing  Pain  Pain Assessment: 0-10  0-10 (Numeric) Pain Score: 2  Pain Type: Chronic pain  Pain Location: Ankle  Pain Orientation: Right, Inner  Pain Descriptors: Aching, Sore    Objective   Treatments:  HR/TR 1/2 roll 2x10  Towel wipers 2x30  Towel scrunches 2x30  Ankle 4-way GTB x20  Retro step ups 6in 2x10     Manual  IASTM  PROM  RockTape     Assessment   Pt ambulates into clinic with lace up brace, mild antalgia. Reduction familiar tightness and discomfort medial/lateral ankle and arch foot STM as well as improved mobility ankle. Noted tissue tension plantar surface as well along peroneal. Added retro step ups and standing HR/TR without issue for improved mobility and strength.   Plan:  Cont to progress strength and endurance     OP EDUCATION:       Goals:

## 2024-11-12 ENCOUNTER — APPOINTMENT (OUTPATIENT)
Dept: ORTHOPEDIC SURGERY | Facility: CLINIC | Age: 59
End: 2024-11-12
Payer: COMMERCIAL

## 2024-11-12 RX ORDER — MELOXICAM 15 MG/1
TABLET ORAL
Qty: 90 TABLET | Refills: 3 | Status: SHIPPED | OUTPATIENT
Start: 2024-11-12

## 2024-11-12 NOTE — TELEPHONE ENCOUNTER
requesting medication refill. Please approve or deny this request.    Rx requested:  Requested Prescriptions     Pending Prescriptions Disp Refills    meloxicam (MOBIC) 15 MG tablet [Pharmacy Med Name: MELOXICAM 15 MG TABLET] 90 tablet 3     Sig: TAKE 1 TABLET BY MOUTH EVERY DAY AFTER A MEAL UNTIL RELIEF         Last Office Visit:   10/17/2024      Next Visit Date:  Future Appointments   Date Time Provider Department Center   1/20/2025  2:30 PM Esther Elder, APRN - CNP [facility] DE[facility]

## 2024-12-05 ENCOUNTER — APPOINTMENT (OUTPATIENT)
Dept: ORTHOPEDIC SURGERY | Facility: CLINIC | Age: 59
End: 2024-12-05
Payer: COMMERCIAL

## 2024-12-05 DIAGNOSIS — M79.641 PAIN OF RIGHT HAND: Primary | ICD-10-CM

## 2024-12-05 RX ORDER — DICLOFENAC SODIUM 10 MG/G
4 GEL TOPICAL 4 TIMES DAILY
Qty: 50 G | Refills: 0 | Status: SHIPPED | OUTPATIENT
Start: 2024-12-05

## 2024-12-05 RX ORDER — LIDOCAINE HYDROCHLORIDE 10 MG/ML
0.5 INJECTION, SOLUTION INFILTRATION; PERINEURAL
Status: COMPLETED | OUTPATIENT
Start: 2024-12-05 | End: 2024-12-05

## 2024-12-05 NOTE — PROGRESS NOTES
History of Present Illness  Patient returns today for evaluation of right ring trigger finger and right ring DIP arthritis.  History of cortisone injection into trigger finger in February and DIP in March with improvement in pain    Physical Examination:  Right upper extremity:  The patient appears to be their stated age, is in no apparent distress, and is oriented x3. The patients mood and affect are appropriate. The patients gait is normal. The examination of the limb in question was performed in comparison to the contralateral limb.    On musculoskeletal examination, tenderness palpation over the ring finger DIP.  No erythema warmth or signs of infection.  tenderness palpation over the right ring A1 pulley.      Sensation and motor function are intact in the radial, and median nerve distribution. There is no obvious thenar atrophy, and thenar strength is 5/5. There is no intrinsic atrophy, and intrinsic strength is 5/5.  The patient can make a full composite fist. The hand itself is warm and well perfused. The skin is intact throughout. The contralateral hand/wrist are normal to inspection, range of motion, stability, and strength.    Hand / UE Inj/Asp: R ring DIP for osteoarthritis on 12/5/2024 11:10 AM  Indications: pain  Details: 24 G needle, volar approach  Medications: 5 mg triamcinolone acetonide 10 mg/mL; 0.5 mL lidocaine 10 mg/mL (1 %)  Procedure, treatment alternatives, risks and benefits explained, specific risks discussed. Consent was given by the patient. Immediately prior to procedure a time out was called to verify the correct patient, procedure, equipment, support staff and site/side marked as required.       Hand / UE Inj/Asp: R ring A1 for trigger finger on 12/5/2024 11:11 AM  Indications: pain  Details: 24 G needle, volar approach  Medications: 5 mg triamcinolone acetonide 10 mg/mL; 0.5 mL lidocaine 10 mg/mL (1 %)  Procedure, treatment alternatives, risks and benefits explained, specific  risks discussed. Consent was given by the patient. Immediately prior to procedure a time out was called to verify the correct patient, procedure, equipment, support staff and site/side marked as required.             Assessment:  Patient with right ring DIP arthritis pain.  Additionally recurrent right ring trigger finger    Plan:   Injection.  I explained the risks and benefits of an injection. Specifically, I reviewed the risks of injection, which include, but are not limited to, infection, bleeding, nerve injury, pain, steroid flare, glycemic alteration, subcutaneous fat atrophy, skin hypopigmentation, soft tissue damage, and incomplete symptom relief. At this time, the patient would like to proceed with an injection. After obtaining consent, I injected a 1mL combination of Kenalog and 1% lidocaine into right ring DIP and r ring A1 pulley, sterile technique. The injection site was dressed, and the patient tolerated the injection well. I am hopeful that this injection will serve diagnostic, prognostic, and therapeutic purposes. Finally, I have emphasized patience, as any benefit may take several weeks to manifest. Depending on the success of the injection, I will see them back  3 months        Complaining of chronic left shoulder pain.  Referral to J for further evaluation    Misty Watson MD

## 2024-12-06 ENCOUNTER — TREATMENT (OUTPATIENT)
Dept: PHYSICAL THERAPY | Facility: CLINIC | Age: 59
End: 2024-12-06
Payer: COMMERCIAL

## 2024-12-06 DIAGNOSIS — M76.71 PERONEAL TENDINITIS OF LOWER LEG, RIGHT: ICD-10-CM

## 2024-12-06 PROCEDURE — 97110 THERAPEUTIC EXERCISES: CPT | Mod: GP,CQ

## 2024-12-06 PROCEDURE — 97140 MANUAL THERAPY 1/> REGIONS: CPT | Mod: GP,CQ

## 2024-12-06 ASSESSMENT — PAIN - FUNCTIONAL ASSESSMENT: PAIN_FUNCTIONAL_ASSESSMENT: 0-10

## 2024-12-06 ASSESSMENT — PAIN SCALES - GENERAL: PAINLEVEL_OUTOF10: 3

## 2024-12-06 NOTE — PROGRESS NOTES
Physical Therapy Treatment    Patient Name: Miroslava Holbrook  MRN: [id number]  Today's Date: 12/6/2024  Time Calculation  Start Time: 0745  Stop Time: 0825  Time Calculation (min): 40 min  PT Therapeutic Procedures Time Entry  Manual Therapy Time Entry: 23  Therapeutic Exercise Time Entry: 15       Current Problem  1. Peroneal tendinitis of lower leg, right  Follow Up In Physical Therapy          Subjective   General   Pt states her ankle feels pretty good.    Insurance   [employer]  Visits 20  Visit 4/4    Precautions   Fibular fx, non-healing    Pain  Pain Assessment: 0-10  0-10 (Numeric) Pain Score: 3  Pain Type: Chronic pain  Pain Location: Ankle  Pain Orientation: Right    Objective   Treatments:  HR/TR 1/2 roll 20 reps e  Towel wipers 20 reps e  Towel scrunches 2x30- NT  Ankle 4-way GTB x20  Retro step ups 6in, Ascending with R/L, 20 reps e  R SLS, compliant foam oval, 10''x10     Manual  STM  PROM  RockTape - NT    Assessment   Began treatment with STM and PROM to the R ankle for improved joint and soft tissue mobility. Focused the remainder of treatment on strengthening the R ankle in all planes to help tolerance to functional activity. Continue to progress strength in the R ankle for ease of everyday activity.    Plan:  Cont to progress strength and endurance       Session Completed and Documented By: Gabino Mejia    OP EDUCATION:       Goals:

## 2024-12-20 ENCOUNTER — APPOINTMENT (OUTPATIENT)
Dept: PHYSICAL THERAPY | Facility: CLINIC | Age: 59
End: 2024-12-20
Payer: COMMERCIAL

## 2025-01-03 ENCOUNTER — APPOINTMENT (OUTPATIENT)
Dept: ORTHOPEDIC SURGERY | Facility: CLINIC | Age: 60
End: 2025-01-03
Payer: COMMERCIAL

## 2025-01-03 ENCOUNTER — HOSPITAL ENCOUNTER (OUTPATIENT)
Dept: RADIOLOGY | Facility: HOSPITAL | Age: 60
Discharge: HOME | End: 2025-01-03
Payer: COMMERCIAL

## 2025-01-03 DIAGNOSIS — E78.2 MIXED HYPERLIPIDEMIA: Primary | ICD-10-CM

## 2025-01-03 DIAGNOSIS — E03.9 ACQUIRED HYPOTHYROIDISM: ICD-10-CM

## 2025-01-03 DIAGNOSIS — M25.512 LEFT SHOULDER PAIN, UNSPECIFIED CHRONICITY: ICD-10-CM

## 2025-01-03 PROCEDURE — 73030 X-RAY EXAM OF SHOULDER: CPT | Mod: LT

## 2025-01-03 RX ORDER — LIDOCAINE HYDROCHLORIDE 10 MG/ML
4 INJECTION, SOLUTION INFILTRATION; PERINEURAL
Status: COMPLETED | OUTPATIENT
Start: 2025-01-03 | End: 2025-01-03

## 2025-01-03 RX ORDER — TRIAMCINOLONE ACETONIDE 40 MG/ML
40 INJECTION, SUSPENSION INTRA-ARTICULAR; INTRAMUSCULAR
Status: COMPLETED | OUTPATIENT
Start: 2025-01-03 | End: 2025-01-03

## 2025-01-03 RX ADMIN — TRIAMCINOLONE ACETONIDE 40 MG: 40 INJECTION, SUSPENSION INTRA-ARTICULAR; INTRAMUSCULAR at 11:12

## 2025-01-03 RX ADMIN — LIDOCAINE HYDROCHLORIDE 4 ML: 10 INJECTION, SOLUTION INFILTRATION; PERINEURAL at 11:12

## 2025-01-03 NOTE — PROGRESS NOTES
Chief Complaint   Patient presents with    Left Shoulder - Pain, New Patient Visit     Ref by EO  Xray today       HPI  59-year-old female presents today for evaluation of her left shoulder endorses longstanding left shoulder pain no history of trauma.  Difficult time with activities given the shoulder pain.  Pain starts about the shoulder region and then does radiate down the lateral arm.  Some days worse than others.  Has been quite constant lately.  Presents today for orthopedic evaluation treatment has not attempted any injections in the past.    No past medical history on file.    No past surgical history on file.     No Known Allergies     Physical exam    General: Alert and oriented to place, person, and time.  No acute distress and breathing comfortably; pleasant and cooperative with the examination.  HEENT: Head is normocephalic and atraumatic.  Neck: Supple, no visible swelling.  Cardiovascular: Good perfusion to the affected extremity.  Lungs: No audible wheezing or labored breathing.  Abdomen: Nondistended  HEME/Lymph : No visible abnormalities bilateral lower extremity    Extremity:  Left shoulder:     Skin healthy to gross inspection  No ecchymosis, no swelling, no gross atrophy  No tenderness to palpation over acromioclavicular joint  No tenderness to palpation over biceps tendon  No tenderness to palpation over the cervical spine      ROM:  Full forward flexion  Full external rotation  IR to thoracic spine, symmetric to contralateral side  Strength:  4-/5 Resisted elevation  5/5 Resisted external rotation  Negative lift off test   Negative Spurling´s test  Positive Neer and Hawking´s test  Neurovascular exam normal distally    Diagnostics:  X-rays collected in clinic today my interpretation as follows no acute osseous abnormality no fracture dislocation appreciated maintained joint space within the glenohumeral reticulation, downsloping acromion.  Small calcification about the superior aspect of  the glenoid    Procedure:  L Inj/Asp: L subacromial bursa on 1/3/2025 11:12 AM  Indications: pain  Details: 22 G needle, posterior approach  Medications: 40 mg triamcinolone acetonide 40 mg/mL; 4 mL lidocaine 10 mg/mL (1 %)  Consent was given by the patient. Immediately prior to procedure a time out was called to verify the correct patient, procedure, equipment, support staff and site/side marked as required. Patient was prepped and draped in the usual sterile fashion.           Assessment:  59-year-old female with left shoulder impingement, rotator cuff tendinitis    Treatment plan:  The natural history of the condition and its associated treatment alternatives including surgical and nonsurgical options were discussed with the patient at length.  Will proceed with a subacromial injection today  Discussed activities to avoid as well as importance of using pain as a guide  If fails to prove may benefit from an MRI  Wrist benefits alternative treatment discussed with her in detail using shared deformed tissue making wishes to proceed with a subacromial injection.  Wishes to avoid any type of surgery for as long as feasibly possible  All of the patient's questions were answered.

## 2025-01-04 DIAGNOSIS — M79.641 PAIN OF RIGHT HAND: ICD-10-CM

## 2025-01-06 ENCOUNTER — TELEPHONE (OUTPATIENT)
Age: 60
End: 2025-01-06

## 2025-01-06 DIAGNOSIS — S01.20XA OPEN WOUND OF NOSE, UNSPECIFIED OPEN WOUND TYPE, INITIAL ENCOUNTER: Primary | ICD-10-CM

## 2025-01-06 RX ORDER — MUPIROCIN 20 MG/G
OINTMENT TOPICAL 2 TIMES DAILY
Qty: 15 G | Refills: 0 | Status: SHIPPED | OUTPATIENT
Start: 2025-01-06 | End: 2025-01-13

## 2025-01-06 ASSESSMENT — PATIENT HEALTH QUESTIONNAIRE - PHQ9
SUM OF ALL RESPONSES TO PHQ9 QUESTIONS 1 & 2: 0
SUM OF ALL RESPONSES TO PHQ QUESTIONS 1-9: 0
2. FEELING DOWN, DEPRESSED OR HOPELESS: NOT AT ALL
SUM OF ALL RESPONSES TO PHQ QUESTIONS 1-9: 0
SUM OF ALL RESPONSES TO PHQ9 QUESTIONS 1 & 2: 0
1. LITTLE INTEREST OR PLEASURE IN DOING THINGS: NOT AT ALL
SUM OF ALL RESPONSES TO PHQ QUESTIONS 1-9: 0
2. FEELING DOWN, DEPRESSED OR HOPELESS: NOT AT ALL
1. LITTLE INTEREST OR PLEASURE IN DOING THINGS: NOT AT ALL
SUM OF ALL RESPONSES TO PHQ QUESTIONS 1-9: 0

## 2025-01-06 NOTE — TELEPHONE ENCOUNTER
----- Message from Sotero MÁRQUEZ sent at 1/3/2025  3:01 PM EST -----  Regarding: [facility] Appointment Request  [facility] Appointment Request    Patient needs appointment for [facility] Appointment Type: Existing Condition Follow Up.    Patient Requested Dates(s): January 8 or 10 or 15 or 17  Patient Requested Time: anytime  Provider Name: Esther Elder APRN - CNP    Reason for Appointment Request: Established Patient - Available appointments did not meet patient need  --------------------------------------------------------------------------------------------------------------------------    Relationship to Patient: Self     Call Back Information: OK to leave message on voicemail  Preferred Call Back Number: Phone [phone]    3 months follow up

## 2025-01-07 ENCOUNTER — OFFICE VISIT (OUTPATIENT)
Age: 60
End: 2025-01-07
Payer: COMMERCIAL

## 2025-01-07 VITALS
SYSTOLIC BLOOD PRESSURE: 126 MMHG | DIASTOLIC BLOOD PRESSURE: 78 MMHG | HEIGHT: 59 IN | WEIGHT: 147 LBS | BODY MASS INDEX: 29.64 KG/M2

## 2025-01-07 DIAGNOSIS — J34.0 NASAL SEPTUM ULCERATION: ICD-10-CM

## 2025-01-07 DIAGNOSIS — J06.9 VIRAL URI: ICD-10-CM

## 2025-01-07 DIAGNOSIS — G47.00 INSOMNIA, UNSPECIFIED TYPE: ICD-10-CM

## 2025-01-07 DIAGNOSIS — N95.1 VASOMOTOR SYMPTOMS DUE TO MENOPAUSE: Primary | ICD-10-CM

## 2025-01-07 PROCEDURE — 99214 OFFICE O/P EST MOD 30 MIN: CPT | Performed by: NURSE PRACTITIONER

## 2025-01-07 RX ORDER — DEXTROMETHORPHAN HYDROBROMIDE AND PROMETHAZINE HYDROCHLORIDE 15; 6.25 MG/5ML; MG/5ML
5 SYRUP ORAL 4 TIMES DAILY PRN
Qty: 180 ML | Refills: 0 | Status: SHIPPED | OUTPATIENT
Start: 2025-01-07 | End: 2025-01-14

## 2025-01-07 RX ORDER — ALPRAZOLAM 1 MG/1
1 TABLET ORAL NIGHTLY PRN
Qty: 90 TABLET | Refills: 0 | Status: SHIPPED | OUTPATIENT
Start: 2025-01-07 | End: 2025-04-07

## 2025-01-07 RX ORDER — VENLAFAXINE HYDROCHLORIDE 37.5 MG/1
37.5 CAPSULE, EXTENDED RELEASE ORAL DAILY
Qty: 30 CAPSULE | Refills: 3 | Status: SHIPPED | OUTPATIENT
Start: 2025-01-07

## 2025-01-09 RX ORDER — DICLOFENAC SODIUM 10 MG/G
GEL TOPICAL
Qty: 100 G | Refills: 1 | Status: SHIPPED | OUTPATIENT
Start: 2025-01-09

## 2025-01-13 DIAGNOSIS — G47.00 INSOMNIA, UNSPECIFIED TYPE: ICD-10-CM

## 2025-01-13 DIAGNOSIS — E78.2 MIXED HYPERLIPIDEMIA: ICD-10-CM

## 2025-01-13 DIAGNOSIS — E03.9 ACQUIRED HYPOTHYROIDISM: ICD-10-CM

## 2025-01-13 LAB
ALBUMIN SERPL-MCNC: 4 G/DL (ref 3.5–4.6)
ALP SERPL-CCNC: 131 U/L (ref 40–130)
ALT SERPL-CCNC: 18 U/L (ref 0–33)
ANION GAP SERPL CALCULATED.3IONS-SCNC: 8 MEQ/L (ref 9–15)
AST SERPL-CCNC: 19 U/L (ref 0–35)
BASOPHILS # BLD: 0 K/UL (ref 0–0.2)
BASOPHILS NFR BLD: 0.4 %
BILIRUB SERPL-MCNC: 0.3 MG/DL (ref 0.2–0.7)
BUN SERPL-MCNC: 14 MG/DL (ref 6–20)
CALCIUM SERPL-MCNC: 9.2 MG/DL (ref 8.5–9.9)
CHLORIDE SERPL-SCNC: 103 MEQ/L (ref 95–107)
CHOLEST SERPL-MCNC: 189 MG/DL (ref 0–199)
CO2 SERPL-SCNC: 29 MEQ/L (ref 20–31)
CREAT SERPL-MCNC: 0.8 MG/DL (ref 0.5–0.9)
EOSINOPHIL # BLD: 0.1 K/UL (ref 0–0.7)
EOSINOPHIL NFR BLD: 0.9 %
ERYTHROCYTE [DISTWIDTH] IN BLOOD BY AUTOMATED COUNT: 13.2 % (ref 11.5–14.5)
GLOBULIN SER CALC-MCNC: 2.8 G/DL (ref 2.3–3.5)
GLUCOSE SERPL-MCNC: 88 MG/DL (ref 70–99)
HCT VFR BLD AUTO: 43 % (ref 37–47)
HDLC SERPL-MCNC: 67 MG/DL (ref 40–59)
HGB BLD-MCNC: 13.8 G/DL (ref 12–16)
LDLC SERPL CALC-MCNC: 98 MG/DL (ref 0–129)
LYMPHOCYTES # BLD: 1.7 K/UL (ref 1–4.8)
LYMPHOCYTES NFR BLD: 21.2 %
MCH RBC QN AUTO: 29.4 PG (ref 27–31.3)
MCHC RBC AUTO-ENTMCNC: 32.1 % (ref 33–37)
MCV RBC AUTO: 91.5 FL (ref 79.4–94.8)
MONOCYTES # BLD: 0.9 K/UL (ref 0.2–0.8)
MONOCYTES NFR BLD: 11.2 %
NEUTROPHILS # BLD: 5.4 K/UL (ref 1.4–6.5)
NEUTS SEG NFR BLD: 66.1 %
PLATELET # BLD AUTO: 285 K/UL (ref 130–400)
POTASSIUM SERPL-SCNC: 4.2 MEQ/L (ref 3.4–4.9)
PROT SERPL-MCNC: 6.8 G/DL (ref 6.3–8)
RBC # BLD AUTO: 4.7 M/UL (ref 4.2–5.4)
SODIUM SERPL-SCNC: 140 MEQ/L (ref 135–144)
TRIGL SERPL-MCNC: 120 MG/DL (ref 0–150)
TSH SERPL-MCNC: 3.45 UIU/ML (ref 0.44–3.86)
WBC # BLD AUTO: 8.2 K/UL (ref 4.8–10.8)

## 2025-01-16 LAB
6MAM UR QL: NOT DETECTED
7-AMINOCLONAZEPAM: NOT DETECTED
ALPHA-OH-ALPRAZOLAM: PRESENT
ALPHA-OH-MIDAZOLAM, URINE: NOT DETECTED
ALPRAZOLAM: PRESENT
AMPHET UR QL SCN: NOT DETECTED
BARBITURATES: NEGATIVE
BENZOYLECGONINE: NEGATIVE
BUPRENORPHINE: NOT DETECTED
CARISOPRODOL UR QL: NEGATIVE
CLONAZEPAM UR QL: NOT DETECTED
CODEINE: NOT DETECTED
CREAT UR-MCNC: 100.6 MG/DL (ref 20–400)
DIAZEPAM: NOT DETECTED
ETHYL GLUCURONIDE: NEGATIVE
FENTANYL UR QL: NOT DETECTED
GABAPENTIN: NOT DETECTED
HYDROCODONE UR QL: NOT DETECTED
HYDROMORPHONE: NOT DETECTED
LORAZEPAM UR QL: NOT DETECTED
MARIJUANA METABOLITE: NEGATIVE
MDA: NOT DETECTED
MDEA: NOT DETECTED
MDMA UR QL: NOT DETECTED
MEPERIDINE: NOT DETECTED
METHADONE: NEGATIVE
METHAMPHETAMINE: NOT DETECTED
METHYLPHENIDATE: NOT DETECTED
MIDAZOLAM UR QL SCN: NOT DETECTED
MORPHINE: NOT DETECTED
NALOXONE: NOT DETECTED
NORBUPRENORPHINE, FREE: NOT DETECTED
NORDIAZEPAM: NOT DETECTED
NORFENTANYL: NOT DETECTED
NORHYDROCODONE, URINE: NOT DETECTED
NOROXYCODONE: NOT DETECTED
NOROXYMORPHONE, URINE: NOT DETECTED
OXAZEPAM UR QL: NOT DETECTED
OXYCODONE UR QL: NOT DETECTED
OXYMORPHONE UR QL: NOT DETECTED
PAIN MANAGEMENT DRUG PANEL: NORMAL
PATHOLOGY STUDY: NORMAL
PCP: NEGATIVE
PHENTERMINE: NOT DETECTED
PREGABALIN: NOT DETECTED
TAPENTADOL, URINE: NOT DETECTED
TAPENTADOL-O-SULFATE, URINE: NOT DETECTED
TEMAZEPAM: NOT DETECTED
TRAMADOL: NEGATIVE
ZOLPIDEM: NOT DETECTED

## 2025-02-07 RX ORDER — LEVOTHYROXINE SODIUM 50 UG/1
50 TABLET ORAL DAILY
Qty: 90 TABLET | Refills: 0 | Status: SHIPPED | OUTPATIENT
Start: 2025-02-07

## 2025-02-07 NOTE — TELEPHONE ENCOUNTER
Please approve or deny request. Thank you!    Rx requested:  Requested Prescriptions     Pending Prescriptions Disp Refills    levothyroxine (SYNTHROID) 50 MCG tablet [Pharmacy Med Name: LEVOTHYROXINE 50 MCG TABLET] 90 tablet 0     Sig: TAKE 1 TABLET BY MOUTH EVERY DAY         Last Office Visit:   1/7/2025      Next Visit Date:  No future appointments.

## 2025-02-18 RX ORDER — ALBUTEROL SULFATE 90 UG/1
2 INHALANT RESPIRATORY (INHALATION) 4 TIMES DAILY PRN
Qty: 18 EACH | Refills: 1 | Status: SHIPPED | OUTPATIENT
Start: 2025-02-18

## 2025-03-07 ENCOUNTER — APPOINTMENT (OUTPATIENT)
Dept: ORTHOPEDIC SURGERY | Facility: CLINIC | Age: 60
End: 2025-03-07
Payer: COMMERCIAL

## 2025-04-02 ENCOUNTER — APPOINTMENT (OUTPATIENT)
Dept: ORTHOPEDIC SURGERY | Facility: CLINIC | Age: 60
End: 2025-04-02
Payer: COMMERCIAL

## 2025-04-02 DIAGNOSIS — M75.102 TEAR OF LEFT ROTATOR CUFF, UNSPECIFIED TEAR EXTENT, UNSPECIFIED WHETHER TRAUMATIC: Primary | ICD-10-CM

## 2025-04-02 PROCEDURE — 99213 OFFICE O/P EST LOW 20 MIN: CPT | Performed by: STUDENT IN AN ORGANIZED HEALTH CARE EDUCATION/TRAINING PROGRAM

## 2025-04-02 PROCEDURE — 99214 OFFICE O/P EST MOD 30 MIN: CPT | Performed by: STUDENT IN AN ORGANIZED HEALTH CARE EDUCATION/TRAINING PROGRAM

## 2025-04-02 NOTE — PROGRESS NOTES
Chief Complaint   Patient presents with    Left Shoulder - Follow-up     S/P CSI 1/3/2025       HPI  59-year-old female presents today for evaluation of her left shoulder endorses longstanding left shoulder pain no history of trauma.  Difficult time with activities given the shoulder pain.  Pain starts about the shoulder region and then does radiate down the lateral arm.  Some days worse than others.  Has been quite constant lately.  Last visit 1/3/2025 we did proceed with a cortisone injection.  Patient states that she did get significant relief however was short-lived only lasting about 2 to 3 weeks.  Has had complete return of symptoms.  Is growing more aggravated given this persistent pain that she has been experiencing for the last 2 years.    No past medical history on file.    No past surgical history on file.     No Known Allergies     Physical exam    General: Alert and oriented to place, person, and time.  No acute distress and breathing comfortably; pleasant and cooperative with the examination.  HEENT: Head is normocephalic and atraumatic.  Neck: Supple, no visible swelling.  Cardiovascular: Good perfusion to the affected extremity.  Lungs: No audible wheezing or labored breathing.  Abdomen: Nondistended  HEME/Lymph : No visible abnormalities bilateral lower extremity    Extremity:  Left shoulder:     Skin healthy to gross inspection  No ecchymosis, no swelling, no gross atrophy  No tenderness to palpation over acromioclavicular joint  No tenderness to palpation over biceps tendon  No tenderness to palpation over the cervical spine      ROM:  Full forward flexion  Full external rotation  IR to thoracic spine, symmetric to contralateral side  Strength:  4-/5 Resisted elevation  5/5 Resisted external rotation  Negative lift off test   Negative Spurling´s test  Positive Neer and Hawking´s test  Neurovascular exam normal distally    Diagnostics:  Narrative & Impression   Interpreted By:  Titi  Alta,   STUDY:  Left shoulder, 3 views.      INDICATION:  Signs/Symptoms:pain.      COMPARISON:  None.      ACCESSION NUMBER(S):  [id number]      ORDERING CLINICIAN:  CHRISTEN STEWART      FINDINGS:  No acute fracture or malalignment.  No significant degenerative changes.  Focal nonspecific calcification noted in the superior aspect of the  glenoid which may represent labral calcification or calcific  tendinosis.      IMPRESSION:  1. As above.      MACRO:      None.      Signed by: Alta Walls 1/5/2025 11:34 AM  Dictation workstation:   [id number]       Procedure:  Procedures    Assessment:  59-year-old female with left shoulder impingement, rotator cuff tendinitis    Treatment plan:  The natural history of the condition and its associated treatment alternatives including surgical and nonsurgical options were discussed with the patient at length.  Treatment modalities including physical therapy, oral anti-inflammatories, cortisone injection, ultimately shoulder arthroscopy as well as risk benefits contraindications alternatives these were all discussed.  Discussed activities to avoid as well as importance of using pain as a guide  Given that relief provided by cortisone injection was short-lived do believe it is medically indicated to obtain an MRI for further evaluation of her shoulder.  Patient will follow-up after MRI is obtained to discuss findings and treatment options  All of the patient's questions were answered.    Anam Morrison PA-C    In a face to face encounter, I evaluated the patient and performed a physical examination, discussed pertinent diagnostic studies if indicated and discussed diagnosis and management strategies with both the patient and physician assistant / nurse practitioner.  I reviewed the PA/NP's note and agree with the documented findings and plan of care.     52-year-old female presents today for repeat follow-up for her left shoulder.  Has had mild improvement with injection for  about 2 to 3 weeks time has had return of pain as of late endorses some clicking catching popping which is causing her quite a bit of pain.  On examination she has mild weakness with rotator cuff testing, pain to palpation of the proximal biceps tendon.  Given constellation of findings we will obtain an MRI due to concerns for a traumatic rotator cuff tear.    The history and clinical exam are consistent with intraarticular pathology and therefore MRI is medically indicated to evaluate the soft tissues of the joint. Follow up in one week or after completion of the MRI to advance management accordingly  The patient understands and agrees with the plan.  Nicko Watson III, MD

## 2025-04-11 DIAGNOSIS — G47.00 INSOMNIA, UNSPECIFIED TYPE: ICD-10-CM

## 2025-04-16 RX ORDER — ALPRAZOLAM 1 MG/1
1 TABLET ORAL NIGHTLY PRN
Qty: 90 TABLET | Refills: 0 | OUTPATIENT
Start: 2025-04-16 | End: 2025-07-15

## 2025-04-17 ENCOUNTER — HOSPITAL ENCOUNTER (OUTPATIENT)
Dept: RADIOLOGY | Facility: HOSPITAL | Age: 60
Discharge: HOME | End: 2025-04-17
Payer: COMMERCIAL

## 2025-04-17 DIAGNOSIS — M75.102 TEAR OF LEFT ROTATOR CUFF, UNSPECIFIED TEAR EXTENT, UNSPECIFIED WHETHER TRAUMATIC: ICD-10-CM

## 2025-04-17 PROCEDURE — 73221 MRI JOINT UPR EXTREM W/O DYE: CPT | Mod: LT

## 2025-04-18 ENCOUNTER — OFFICE VISIT (OUTPATIENT)
Age: 60
End: 2025-04-18
Payer: COMMERCIAL

## 2025-04-18 VITALS
TEMPERATURE: 97.5 F | SYSTOLIC BLOOD PRESSURE: 138 MMHG | OXYGEN SATURATION: 98 % | BODY MASS INDEX: 29.64 KG/M2 | DIASTOLIC BLOOD PRESSURE: 80 MMHG | HEIGHT: 59 IN | RESPIRATION RATE: 13 BRPM | WEIGHT: 147 LBS | HEART RATE: 80 BPM

## 2025-04-18 DIAGNOSIS — F41.9 ANXIETY: Primary | ICD-10-CM

## 2025-04-18 PROCEDURE — 99214 OFFICE O/P EST MOD 30 MIN: CPT | Performed by: STUDENT IN AN ORGANIZED HEALTH CARE EDUCATION/TRAINING PROGRAM

## 2025-04-18 RX ORDER — HYDROCODONE BITARTRATE AND ACETAMINOPHEN 5; 325 MG/1; MG/1
TABLET ORAL
COMMUNITY
Start: 2024-08-01

## 2025-04-18 RX ORDER — ALPRAZOLAM 1 MG/1
1 TABLET ORAL NIGHTLY PRN
Qty: 30 TABLET | Refills: 0 | Status: SHIPPED | OUTPATIENT
Start: 2025-04-18 | End: 2025-05-18

## 2025-04-18 RX ORDER — MECLIZINE HYDROCHLORIDE 25 MG/1
TABLET ORAL
Qty: 30 TABLET | Refills: 2 | OUTPATIENT
Start: 2025-04-18

## 2025-04-18 RX ORDER — BUSPIRONE HYDROCHLORIDE 7.5 MG/1
7.5 TABLET ORAL 2 TIMES DAILY
Qty: 60 TABLET | Refills: 0 | Status: SHIPPED | OUTPATIENT
Start: 2025-04-18 | End: 2025-05-18

## 2025-04-18 RX ORDER — MECLIZINE HYDROCHLORIDE 25 MG/1
TABLET ORAL
Qty: 30 TABLET | Refills: 2 | Status: SHIPPED | OUTPATIENT
Start: 2025-04-18

## 2025-04-18 SDOH — ECONOMIC STABILITY: FOOD INSECURITY: WITHIN THE PAST 12 MONTHS, THE FOOD YOU BOUGHT JUST DIDN'T LAST AND YOU DIDN'T HAVE MONEY TO GET MORE.: NEVER TRUE

## 2025-04-18 SDOH — ECONOMIC STABILITY: FOOD INSECURITY: WITHIN THE PAST 12 MONTHS, YOU WORRIED THAT YOUR FOOD WOULD RUN OUT BEFORE YOU GOT MONEY TO BUY MORE.: NEVER TRUE

## 2025-04-18 NOTE — PROGRESS NOTES
TIMES A DAY AS NEEDED FOR DIZZINESS 30 tablet 2    HYDROcodone-acetaminophen (NORCO) 5-325 MG per tablet Take by mouth.      busPIRone (BUSPAR) 7.5 MG tablet Take 1 tablet by mouth 2 times daily 60 tablet 0    ALPRAZolam (XANAX) 1 MG tablet Take 1 tablet by mouth nightly as needed for Sleep for up to 30 days. Max Daily Amount: 1 mg 30 tablet 0    albuterol sulfate HFA (PROVENTIL;VENTOLIN;PROAIR) 108 (90 Base) MCG/ACT inhaler INHALE 2 PUFFS INTO THE LUNGS 4 TIMES DAILY AS NEEDED FOR WHEEZING. 18 each 1    levothyroxine (SYNTHROID) 50 MCG tablet TAKE 1 TABLET BY MOUTH EVERY DAY 90 tablet 0    meloxicam (MOBIC) 15 MG tablet TAKE 1 TABLET BY MOUTH EVERY DAY AFTER A MEAL UNTIL RELIEF 90 tablet 3    omeprazole (PRILOSEC) 20 MG delayed release capsule TAKE ONE CAPSULE ONE TIME DAILY 90 capsule 3    DULoxetine (CYMBALTA) 60 MG extended release capsule TAKE 1 CAPSULE BY MOUTH EVERY DAY 90 capsule 3    doxepin (SINEQUAN) 10 MG capsule TAKE 1 CAPSULE BY MOUTH NIGHTLY 90 capsule 3    fluocinonide (LIDEX) 0.05 % cream Apply topically 2 times daily. 60 g 0    oxybutynin (DITROPAN XL) 15 MG extended release tablet Take 1 tablet by mouth daily 90 tablet 3    montelukast (SINGULAIR) 10 MG tablet Take 1 tablet by mouth nightly 90 tablet 3     No current facility-administered medications for this visit.     Allergies, PMH, Surgical Hx, Family Hx, and Social Hx reviewed and updated.    Objective    Vitals:    04/18/25 1351   BP: 138/80   BP Site: Right Upper Arm   Patient Position: Sitting   BP Cuff Size: Medium Adult   Pulse: 80   Resp: 13   Temp: 97.5 °F (36.4 °C)   TempSrc: Temporal   SpO2: 98%   Weight: 66.7 kg (147 lb)   Height: 1.499 m (4' 11.02\")       Physical Exam  Vitals reviewed.   Constitutional:       General: She is not in acute distress.  HENT:      Head: Normocephalic.   Pulmonary:      Effort: Pulmonary effort is normal. No respiratory distress.   Musculoskeletal:      Cervical back: Normal range of motion.

## 2025-04-22 ENCOUNTER — HOSPITAL ENCOUNTER (OUTPATIENT)
Dept: RADIOLOGY | Facility: HOSPITAL | Age: 60
Discharge: HOME | End: 2025-04-22
Payer: COMMERCIAL

## 2025-04-22 ENCOUNTER — OFFICE VISIT (OUTPATIENT)
Dept: ORTHOPEDIC SURGERY | Facility: CLINIC | Age: 60
End: 2025-04-22
Payer: COMMERCIAL

## 2025-04-22 DIAGNOSIS — M79.672 LEFT FOOT PAIN: ICD-10-CM

## 2025-04-22 DIAGNOSIS — M76.812 ANTERIOR TIBIALIS TENDINITIS OF LEFT LEG: ICD-10-CM

## 2025-04-22 DIAGNOSIS — S93.602A SPRAIN OF LEFT FOOT, INITIAL ENCOUNTER: Primary | ICD-10-CM

## 2025-04-22 PROCEDURE — 73630 X-RAY EXAM OF FOOT: CPT | Mod: LT

## 2025-04-22 PROCEDURE — L4361 PNEUMA/VAC WALK BOOT PRE OTS: HCPCS | Performed by: STUDENT IN AN ORGANIZED HEALTH CARE EDUCATION/TRAINING PROGRAM

## 2025-04-22 PROCEDURE — 73630 X-RAY EXAM OF FOOT: CPT | Mod: LEFT SIDE | Performed by: RADIOLOGY

## 2025-04-22 PROCEDURE — 99213 OFFICE O/P EST LOW 20 MIN: CPT | Performed by: STUDENT IN AN ORGANIZED HEALTH CARE EDUCATION/TRAINING PROGRAM

## 2025-04-22 NOTE — PROGRESS NOTES
Acute Injury Established Patient Visit    HPI: Miroslava is a 60 y.o.female who presents today with new complaints of left foot injury.  Last night, she was playing with her dog, and all of a sudden felt the pop or pull in her anterior ankle and dorsum of her foot.  She notices some swelling over this region.  She denies any bruising.  She denies any numbness and tingling.  It is a sharp pain that is worse with weightbearing.  She has tried some Tylenol, ice, elevation.    Plan: For this left foot sprain and possible strain of her tibialis anterior and EHL tendon, will settle things down in a short boot to be worn when she is weightbearing, but coming out for rest, skin care, showering, and sleep, starting meloxicam from home, and continue with conservative treatment measures as discussed.  Additionally, discussed conservative treatment measures including rest, ice, elevation, compression, and over-the-counter analgesia as needed and as appropriate.  Risks of NSAID use, steroid use, and muscle relaxers discussed in depth and considered in light of medical comorbidities.  Patient, and parent/guardian as applicable, understand agree with plan.  Follow-up: 2 weeks  X-rays on follow-up: Left foot and left ankle      Assessment:   Problem List Items Addressed This Visit    None  Visit Diagnoses         Sprain of left foot, initial encounter    -  Primary    Relevant Orders    Walking boot      Left foot pain        Relevant Orders    XR foot left 3+ views      Anterior tibialis tendinitis of left leg                Diagnostics: Reviewed all relevant imaging including x-ray, MRI, CT, and US.      Procedure:  Procedures    Physical Exam:  GENERAL:  No obvious acute distress.  NEURO:  Distally neurovascularly intact.  Sensation intact to light touch.  Extremity: Left foot examination:  Skin is intact;  No erythema or warmth;  No obvious deformity;  No clinical signs of infection;  No pain over the base of the fifth  metatarsal bone;  TENDER and mildly swollen in the midfoot more medially, but overlying the tendons of the tibialis anterior muscle and EHL;  No pain over the metatarsal bones;  No pain over the cuboid bone;  No pain over the calcaneus;  No pain over the plantar aponeurosis;  No pain over the proximal phalanx of the right great toe;  No pain over distal phalanx of the right great toe; and  Neurovascularly intact.    Orders Placed This Encounter    Walking boot    XR foot left 3+ views      At the conclusion of the visit there were no further questions by the patient/family regarding their plan of care.  Patient was instructed to call or return with any issues, questions, or concerns regarding their injury and/or treatment plan described above.     04/22/25 at 11:32 AM - Jose Hunter,     Office: [phone]    This note was prepared using voice recognition software.  The details of this note are correct and have been reviewed, and corrected to the best of my ability.  Some grammatical errors may persist related to the Dragon software.

## 2025-04-25 ENCOUNTER — APPOINTMENT (OUTPATIENT)
Dept: ORTHOPEDIC SURGERY | Facility: CLINIC | Age: 60
End: 2025-04-25
Payer: COMMERCIAL

## 2025-04-25 DIAGNOSIS — S43.432A SUPERIOR LABRUM ANTERIOR-TO-POSTERIOR (SLAP) TEAR OF LEFT SHOULDER: Primary | ICD-10-CM

## 2025-04-25 ASSESSMENT — PAIN SCALES - GENERAL: PAINLEVEL_OUTOF10: 7

## 2025-04-25 ASSESSMENT — PAIN - FUNCTIONAL ASSESSMENT: PAIN_FUNCTIONAL_ASSESSMENT: 0-10

## 2025-04-25 NOTE — PROGRESS NOTES
Chief Complaint   Patient presents with    Left Shoulder - Follow-up     MRI REVIEW 4/17/25  S/P CSI 1/3/2025       HPI  59-year-old female presents today for evaluation of her left shoulder endorses longstanding left shoulder pain no history of trauma.  Difficult time with activities given the shoulder pain.  Pain starts about the shoulder region and then does radiate down the lateral arm.  Some days worse than others.  Presents today for discussion of MRI results    We did proceed with a subacromial injection 8 visits prior did give her a couple weeks of relief.  No past medical history on file.    No past surgical history on file.     No Known Allergies     Physical exam    General: Alert and oriented to place, person, and time.  No acute distress and breathing comfortably; pleasant and cooperative with the examination.  HEENT: Head is normocephalic and atraumatic.  Neck: Supple, no visible swelling.  Cardiovascular: Good perfusion to the affected extremity.  Lungs: No audible wheezing or labored breathing.  Abdomen: Nondistended  HEME/Lymph : No visible abnormalities bilateral lower extremity    Extremity:  Left shoulder:     Skin healthy to gross inspection  No ecchymosis, no swelling, no gross atrophy  No tenderness to palpation over acromioclavicular joint  +tenderness to palpation over biceps tendon  No tenderness to palpation over the cervical spine      ROM:  Full forward flexion  Full external rotation  IR to thoracic spine, symmetric to contralateral side  Strength:  4-/5 Resisted elevation  5/5 Resisted external rotation  Negative lift off test   Negative Spurling´s test  Positive Neer and Hawking´s test  Neurovascular exam normal distally    Diagnostics:  XR foot left 3+ views  Result Date: 4/23/2025  Interpreted By:  Carmencita Montemayor, STUDY: XR FOOT LEFT 3+ VIEWS;  4/22/2025 11:12 am   INDICATION: Signs/Symptoms:Pain.   COMPARISON: None.   ACCESSION NUMBER(S): [id number]   ORDERING CLINICIAN:  ARMIDA FELICIA   FINDINGS: No acute fracture or dislocation is seen.   No soft tissue swelling is identified. No erosions or periosteal reaction is seen.   No radiopaque foreign bodies are seen. Calcaneal spur is seen.       Calcaneal spur.     MACRO: None   Signed by: Carmencita Montemayor 4/23/2025 5:52 PM Dictation workstation:   [id number]    MR shoulder left wo IV contrast  Result Date: 4/18/2025  Interpreted By:  Humphrey Guthrie, STUDY: MR SHOULDER LEFT WO IV CONTRAST; ;  4/17/2025 3:27 pm   INDICATION: Signs/Symptoms:pain.   COMPARISON: None.   ACCESSION NUMBER(S): [id number]   ORDERING CLINICIAN: CHRISTEN STEWART   TECHNIQUE: Multiplanar and multisequential MR images of the left shoulder performed.   FINDINGS: There is a small joint effusion. There is mild fluid distention of the subacromial/subdeltoid bursa.   There are mild changes of acromioclavicular arthrosis. There is no widening of the joint space. There is no evidence of acute osseous fracture, bone marrow edema, or osseous mass.   There is trace fluid in the biceps tendon sheath. The extra-articular long head biceps tendon is intact and normally positioned. The intra-articular tendon is intact to level the biceps anchor. There is complex signal in the superior labrum with linear extension laterally into the labral substance compatible with slap tear. This appearance extends posteriorly.   The supraspinatus tendon demonstrates amorphous intermediate intrasubstance signal in the distal tendon with mild contour irregularity along the bursal surface. There is no focal or full-thickness tendon discontinuity or tendon retraction. There is no muscle edema or atrophy.   The infraspinatus tendon demonstrates mild intermediate intrasubstance signal in the distal tendon along the bursal surface. There is no focal or full-thickness tendon discontinuity. There is no muscle edema or atrophy.   The teres minor tendon is intact. The muscle bellies of normal morphology  and signal.   The subscapularis tendon is intact. The muscle bellies of normal morphology and signal.       Distal supraspinatus and infraspinatus tendinopathy with mild contour irregularity along the distal bursal surface. There is no focal or full-thickness tendon tear.   Mild subacromial/subdeltoid bursitis.   Complex tearing of the superior labrum extending posteriorly.   The long head biceps tendon is intact and normally positioned.     MACRO: None   Signed by: Humphrey Guthrie 4/18/2025 1:21 PM Dictation workstation:   [id number]          Procedure:  Procedures    Assessment:  59-year-old female with left shoulder impingement, rotator cuff tendinitis, SLAP tear    Treatment plan:  The natural history of the condition and its associated treatment alternatives including surgical and nonsurgical options were discussed with the patient at length.  Will proceed with an intra-articular injection today  I did review patient's MRI my interpretation as follows: No full-thickness rotator cuff tear there is some fraying of the rotator cuff partake about the distal bursal surface.  Signal about the base of the labrum at the superior labrum consistent with SLAP tear.  Constellation of findings discussed with patient today risk-benefit alternative treatment discussed with her as well using shared informed tissue making she does wish to proceed with a glenohumeral joint injection.  She will follow-up if she fails to get any relief from this.  Discussed that she would likely benefit from shoulder arthroscopy, subacromial decompression, open biceps tenodesis

## 2025-04-28 DIAGNOSIS — F41.9 ANXIETY: ICD-10-CM

## 2025-04-28 RX ORDER — ALPRAZOLAM 1 MG/1
TABLET ORAL
Qty: 90 TABLET | Refills: 0 | OUTPATIENT
Start: 2025-04-28 | End: 2025-07-27

## 2025-05-06 ENCOUNTER — APPOINTMENT (OUTPATIENT)
Dept: ORTHOPEDIC SURGERY | Facility: CLINIC | Age: 60
End: 2025-05-06
Payer: COMMERCIAL

## 2025-05-15 DIAGNOSIS — F41.9 ANXIETY: ICD-10-CM

## 2025-05-15 RX ORDER — ALPRAZOLAM 1 MG/1
1 TABLET ORAL NIGHTLY PRN
Qty: 30 TABLET | Refills: 0 | OUTPATIENT
Start: 2025-05-15 | End: 2025-06-14

## 2025-05-16 ENCOUNTER — TELEPHONE (OUTPATIENT)
Age: 60
End: 2025-05-16

## 2025-05-16 DIAGNOSIS — F41.9 ANXIETY: ICD-10-CM

## 2025-05-16 RX ORDER — LEVOTHYROXINE SODIUM 50 UG/1
50 TABLET ORAL DAILY
Qty: 90 TABLET | Refills: 1 | Status: SHIPPED | OUTPATIENT
Start: 2025-05-16

## 2025-05-16 RX ORDER — ALPRAZOLAM 1 MG/1
1 TABLET ORAL NIGHTLY PRN
Qty: 30 TABLET | Refills: 0 | OUTPATIENT
Start: 2025-05-16 | End: 2025-06-15

## 2025-05-16 RX ORDER — ALPRAZOLAM 1 MG/1
1 TABLET ORAL NIGHTLY PRN
Qty: 7 TABLET | Refills: 0 | Status: SHIPPED | OUTPATIENT
Start: 2025-05-16 | End: 2025-05-23

## 2025-05-16 NOTE — TELEPHONE ENCOUNTER
PT called- needs refill for    Alprazolam (Xaxax.) 1MG    Prev orders were refused due to duplicate (both requests were refused)   Another was refused stating PT needed appt-   PT last appt was 4/18     Please approve RX- PT only has 1 tablet left

## 2025-05-16 NOTE — TELEPHONE ENCOUNTER
She saw Tesfaye.  She will need an appointment with me for refills from me.  Benzo and good morning opate patients are supposed to schedule her 90-day follow-up with me before they leave the office, so they do not have problem getting in..  Schedule her with me as soon as she can get in, provider fill time is okay.  Once she has an appointment if Tesfaye will not give her a refill I will send her over enough to get her through to the appointment

## 2025-05-19 ENCOUNTER — OFFICE VISIT (OUTPATIENT)
Age: 60
End: 2025-05-19

## 2025-05-19 VITALS
BODY MASS INDEX: 29.43 KG/M2 | WEIGHT: 146 LBS | SYSTOLIC BLOOD PRESSURE: 120 MMHG | HEART RATE: 92 BPM | OXYGEN SATURATION: 97 % | DIASTOLIC BLOOD PRESSURE: 70 MMHG | TEMPERATURE: 97.8 F | HEIGHT: 59 IN

## 2025-05-19 DIAGNOSIS — G47.00 INSOMNIA, UNSPECIFIED TYPE: Primary | ICD-10-CM

## 2025-05-19 DIAGNOSIS — Z12.31 ENCOUNTER FOR SCREENING MAMMOGRAM FOR MALIGNANT NEOPLASM OF BREAST: ICD-10-CM

## 2025-05-19 PROCEDURE — 99212 OFFICE O/P EST SF 10 MIN: CPT | Performed by: NURSE PRACTITIONER

## 2025-05-19 PROCEDURE — 99211 OFF/OP EST MAY X REQ PHY/QHP: CPT | Performed by: NURSE PRACTITIONER

## 2025-05-19 RX ORDER — ALPRAZOLAM 1 MG/1
1 TABLET ORAL NIGHTLY PRN
Qty: 90 TABLET | Refills: 0 | Status: SHIPPED | OUTPATIENT
Start: 2025-05-19 | End: 2025-08-17

## 2025-05-19 NOTE — PROGRESS NOTES
utilized during this visit to record, process the conversation to generate a clinical note and to support improvement of the AI technology. The patient (or guardian, if applicable) and other individuals in attendance at the appointment consented to the use of AI, including the recording.      An electronic signature was used to authenticate this note.    --Esther Elder, APRN - CNP

## 2025-05-27 ENCOUNTER — LAB (OUTPATIENT)
Dept: LAB | Facility: HOSPITAL | Age: 60
End: 2025-05-27

## 2025-06-17 ENCOUNTER — APPOINTMENT (OUTPATIENT)
Dept: PRIMARY CARE | Facility: CLINIC | Age: 60
End: 2025-06-17
Payer: COMMERCIAL

## 2025-07-17 RX ORDER — DOXEPIN HYDROCHLORIDE 10 MG/1
CAPSULE ORAL
Qty: 90 CAPSULE | Refills: 3 | OUTPATIENT
Start: 2025-07-17

## 2025-07-17 RX ORDER — DOXEPIN HYDROCHLORIDE 10 MG/1
CAPSULE ORAL
Qty: 90 CAPSULE | Refills: 3 | Status: SHIPPED | OUTPATIENT
Start: 2025-07-17

## 2025-07-31 ENCOUNTER — OFFICE VISIT (OUTPATIENT)
Age: 60
End: 2025-07-31
Payer: COMMERCIAL

## 2025-07-31 VITALS
SYSTOLIC BLOOD PRESSURE: 126 MMHG | BODY MASS INDEX: 29.43 KG/M2 | HEART RATE: 58 BPM | TEMPERATURE: 97 F | OXYGEN SATURATION: 96 % | DIASTOLIC BLOOD PRESSURE: 86 MMHG | WEIGHT: 146 LBS | HEIGHT: 59 IN

## 2025-07-31 DIAGNOSIS — R14.0 BLOATING SYMPTOM: ICD-10-CM

## 2025-07-31 DIAGNOSIS — R68.81 EARLY SATIETY: ICD-10-CM

## 2025-07-31 DIAGNOSIS — R10.13 EPIGASTRIC PAIN: ICD-10-CM

## 2025-07-31 DIAGNOSIS — K21.9 GASTROESOPHAGEAL REFLUX DISEASE, UNSPECIFIED WHETHER ESOPHAGITIS PRESENT: ICD-10-CM

## 2025-07-31 DIAGNOSIS — M25.50 CHRONIC PAIN OF MULTIPLE JOINTS: ICD-10-CM

## 2025-07-31 DIAGNOSIS — G89.29 CHRONIC PAIN OF MULTIPLE JOINTS: ICD-10-CM

## 2025-07-31 DIAGNOSIS — R10.11 RUQ PAIN: Primary | ICD-10-CM

## 2025-07-31 DIAGNOSIS — R10.11 RUQ PAIN: ICD-10-CM

## 2025-07-31 LAB
ALBUMIN SERPL-MCNC: 3.8 G/DL (ref 3.5–4.6)
ALP SERPL-CCNC: 129 U/L (ref 40–130)
ALT SERPL-CCNC: 14 U/L (ref 0–33)
AMYLASE SERPL-CCNC: 82 U/L (ref 22–93)
ANION GAP SERPL CALCULATED.3IONS-SCNC: 19 MEQ/L (ref 9–15)
AST SERPL-CCNC: 28 U/L (ref 0–35)
BASOPHILS # BLD: 0 K/UL (ref 0–0.2)
BASOPHILS NFR BLD: 0.6 %
BILIRUB DIRECT SERPL-MCNC: <0.1 MG/DL (ref 0–0.4)
BILIRUB INDIRECT SERPL-MCNC: NORMAL MG/DL (ref 0–0.6)
BILIRUB SERPL-MCNC: <0.2 MG/DL (ref 0.2–0.7)
BILIRUB UR QL STRIP: NEGATIVE
BUN SERPL-MCNC: 11 MG/DL (ref 6–20)
CALCIUM SERPL-MCNC: 9.2 MG/DL (ref 8.5–9.9)
CHLORIDE SERPL-SCNC: 105 MEQ/L (ref 95–107)
CLARITY UR: CLEAR
CO2 SERPL-SCNC: 15 MEQ/L (ref 20–31)
COLOR UR: YELLOW
CREAT SERPL-MCNC: 0.77 MG/DL (ref 0.5–0.9)
EOSINOPHIL # BLD: 0.3 K/UL (ref 0–0.7)
EOSINOPHIL NFR BLD: 3.9 %
ERYTHROCYTE [DISTWIDTH] IN BLOOD BY AUTOMATED COUNT: 13.1 % (ref 11.5–14.5)
GLOBULIN SER CALC-MCNC: 3.1 G/DL (ref 2.3–3.5)
GLUCOSE SERPL-MCNC: 90 MG/DL (ref 70–99)
GLUCOSE UR STRIP-MCNC: NEGATIVE MG/DL
HCT VFR BLD AUTO: 42.1 % (ref 37–47)
HGB BLD-MCNC: 13.5 G/DL (ref 12–16)
HGB UR QL STRIP: NEGATIVE
KETONES UR STRIP-MCNC: NEGATIVE MG/DL
LEUKOCYTE ESTERASE UR QL STRIP: NEGATIVE
LIPASE SERPL-CCNC: 18 U/L (ref 12–95)
LYMPHOCYTES # BLD: 2.2 K/UL (ref 1–4.8)
LYMPHOCYTES NFR BLD: 35.2 %
MCH RBC QN AUTO: 29.3 PG (ref 27–31.3)
MCHC RBC AUTO-ENTMCNC: 32.1 % (ref 33–37)
MCV RBC AUTO: 91.3 FL (ref 79.4–94.8)
MONOCYTES # BLD: 0.7 K/UL (ref 0.2–0.8)
MONOCYTES NFR BLD: 11.6 %
NEUTROPHILS # BLD: 3.1 K/UL (ref 1.4–6.5)
NEUTS SEG NFR BLD: 48.5 %
NITRITE UR QL STRIP: NEGATIVE
PH UR STRIP: 7 [PH] (ref 5–9)
PLATELET # BLD AUTO: 235 K/UL (ref 130–400)
POTASSIUM SERPL-SCNC: 4.8 MEQ/L (ref 3.4–4.9)
PROT SERPL-MCNC: 6.9 G/DL (ref 6.3–8)
PROT UR STRIP-MCNC: NEGATIVE MG/DL
RBC # BLD AUTO: 4.61 M/UL (ref 4.2–5.4)
SODIUM SERPL-SCNC: 139 MEQ/L (ref 135–144)
SP GR UR STRIP: 1.01 (ref 1–1.03)
TSH REFLEX: 2.42 UIU/ML (ref 0.44–3.86)
URATE SERPL-MCNC: 3 MG/DL (ref 2.4–5.7)
URINE REFLEX TO CULTURE: NORMAL
UROBILINOGEN UR STRIP-ACNC: 0.2 E.U./DL
WBC # BLD AUTO: 6.4 K/UL (ref 4.8–10.8)

## 2025-07-31 PROCEDURE — 99214 OFFICE O/P EST MOD 30 MIN: CPT | Performed by: NURSE PRACTITIONER

## 2025-07-31 RX ORDER — SUCRALFATE 1 G/1
1 TABLET ORAL 4 TIMES DAILY
Qty: 120 TABLET | Refills: 0 | Status: SHIPPED | OUTPATIENT
Start: 2025-07-31

## 2025-08-05 LAB
ANA PAT SER IF-IMP: ABNORMAL
ANA PAT SER IF-IMP: ABNORMAL
CYTOPLASMIC AB PATTERN SER IF-IMP: ABNORMAL
CYTOPLASMIC AB TITR SER IF: ABNORMAL {TITER}
NUCLEAR IGG SER QL IF: DETECTED
NUCLEAR IGG TITR SER IF: ABNORMAL {TITER}

## 2025-08-08 ENCOUNTER — HOSPITAL ENCOUNTER (OUTPATIENT)
Dept: ULTRASOUND IMAGING | Age: 60
Discharge: HOME | End: 2025-08-10
Payer: COMMERCIAL

## 2025-08-08 DIAGNOSIS — R10.11 RUQ PAIN: ICD-10-CM

## 2025-08-08 DIAGNOSIS — R14.0 BLOATING SYMPTOM: ICD-10-CM

## 2025-08-08 DIAGNOSIS — R68.81 EARLY SATIETY: ICD-10-CM

## 2025-08-08 LAB
C3 SERPL-MCNC: 162 MG/DL (ref 90–180)
C4 SERPL-MCNC: 29 MG/DL (ref 10–40)
CARBAMYLATED PROTEIN (CARP) ANTIBODY, IGG: 17 UNITS (ref 0–19)
CCP IGA+IGG SERPL IA-ACNC: 76 UNITS (ref 0–19)
ENA RNP IGG SER IA-ACNC: 10 UNITS (ref 0–19)
NUCLEAR IGG SER QL IA: NORMAL
RHEUMATOID FACT SER NEPH-ACNC: <10 IU/ML (ref 0–14)

## 2025-08-08 PROCEDURE — 76856 US EXAM PELVIC COMPLETE: CPT

## 2025-08-08 PROCEDURE — 76705 ECHO EXAM OF ABDOMEN: CPT

## 2025-08-18 DIAGNOSIS — G47.00 INSOMNIA, UNSPECIFIED TYPE: ICD-10-CM

## 2025-08-18 LAB
REASON FOR REJECTION: NORMAL
REJECTED TEST: NORMAL

## 2025-08-19 RX ORDER — ALPRAZOLAM 1 MG/1
1 TABLET ORAL NIGHTLY PRN
Qty: 90 TABLET | Refills: 0 | Status: SHIPPED | OUTPATIENT
Start: 2025-08-19 | End: 2025-11-17

## 2025-08-21 LAB — DSDNA AB TITR SER CLIF: NORMAL IU (ref 0–24)

## 2025-08-22 ENCOUNTER — OFFICE VISIT (OUTPATIENT)
Age: 60
End: 2025-08-22
Payer: COMMERCIAL

## 2025-08-22 ENCOUNTER — OFFICE VISIT (OUTPATIENT)
Dept: GASTROENTEROLOGY | Age: 60
End: 2025-08-22
Payer: COMMERCIAL

## 2025-08-22 VITALS
BODY MASS INDEX: 29.89 KG/M2 | DIASTOLIC BLOOD PRESSURE: 76 MMHG | OXYGEN SATURATION: 97 % | SYSTOLIC BLOOD PRESSURE: 124 MMHG | HEART RATE: 78 BPM | WEIGHT: 148 LBS

## 2025-08-22 VITALS
SYSTOLIC BLOOD PRESSURE: 126 MMHG | WEIGHT: 146 LBS | BODY MASS INDEX: 29.43 KG/M2 | HEIGHT: 59 IN | DIASTOLIC BLOOD PRESSURE: 78 MMHG

## 2025-08-22 DIAGNOSIS — K74.3 PRIMARY BILIARY CHOLANGITIS (HCC): Primary | ICD-10-CM

## 2025-08-22 DIAGNOSIS — R79.89 ABNORMAL LFTS: Primary | ICD-10-CM

## 2025-08-22 DIAGNOSIS — G93.32 CHRONIC FATIGUE DISORDER: ICD-10-CM

## 2025-08-22 DIAGNOSIS — K76.89 AUTOIMMUNE DISEASE OF LIVER: ICD-10-CM

## 2025-08-22 DIAGNOSIS — R79.89 ABNORMAL LFTS: ICD-10-CM

## 2025-08-22 DIAGNOSIS — G47.00 INSOMNIA, UNSPECIFIED TYPE: ICD-10-CM

## 2025-08-22 DIAGNOSIS — N26.1 ATROPHY OF RIGHT KIDNEY: ICD-10-CM

## 2025-08-22 DIAGNOSIS — K76.0 HEPATIC STEATOSIS: ICD-10-CM

## 2025-08-22 LAB
ALBUMIN SERPL-MCNC: 4.3 G/DL (ref 3.5–4.6)
ALP SERPL-CCNC: 132 U/L (ref 40–130)
ALT SERPL-CCNC: 23 U/L (ref 0–33)
ANION GAP SERPL CALCULATED.3IONS-SCNC: 10 MEQ/L (ref 9–15)
AST SERPL-CCNC: 29 U/L (ref 0–35)
BILIRUB SERPL-MCNC: <0.2 MG/DL (ref 0.2–0.7)
BUN SERPL-MCNC: 13 MG/DL (ref 6–20)
CALCIUM SERPL-MCNC: 9.5 MG/DL (ref 8.5–9.9)
CHLORIDE SERPL-SCNC: 100 MEQ/L (ref 95–107)
CO2 SERPL-SCNC: 30 MEQ/L (ref 20–31)
CREAT SERPL-MCNC: 0.78 MG/DL (ref 0.5–0.9)
GLOBULIN SER CALC-MCNC: 2.4 G/DL (ref 2.3–3.5)
GLUCOSE SERPL-MCNC: 108 MG/DL (ref 70–99)
POTASSIUM SERPL-SCNC: 4.1 MEQ/L (ref 3.4–4.9)
PROT SERPL-MCNC: 6.7 G/DL (ref 6.3–8)
SODIUM SERPL-SCNC: 140 MEQ/L (ref 135–144)

## 2025-08-22 PROCEDURE — 99204 OFFICE O/P NEW MOD 45 MIN: CPT | Performed by: INTERNAL MEDICINE

## 2025-08-22 PROCEDURE — 99214 OFFICE O/P EST MOD 30 MIN: CPT | Performed by: NURSE PRACTITIONER

## 2025-08-22 RX ORDER — MODAFINIL 100 MG/1
100 TABLET ORAL DAILY
Qty: 30 TABLET | Refills: 2 | Status: SHIPPED | OUTPATIENT
Start: 2025-08-22 | End: 2025-09-21

## 2025-08-22 RX ORDER — MULTIVIT WITH MINERALS/LUTEIN
400 TABLET ORAL DAILY
Qty: 90 CAPSULE | Refills: 3 | Status: SHIPPED | OUTPATIENT
Start: 2025-08-22

## 2025-08-22 RX ORDER — DULOXETIN HYDROCHLORIDE 20 MG/1
40 CAPSULE, DELAYED RELEASE ORAL DAILY
Qty: 180 CAPSULE | Refills: 1 | Status: SHIPPED | OUTPATIENT
Start: 2025-08-22

## 2025-08-22 RX ORDER — ERGOCALCIFEROL 1.25 MG/1
50000 CAPSULE, LIQUID FILLED ORAL WEEKLY
Qty: 12 CAPSULE | Refills: 3 | Status: SHIPPED | OUTPATIENT
Start: 2025-08-22

## 2025-08-22 RX ORDER — VITAMIN A 3000 MCG
3 CAPSULE ORAL DAILY
Qty: 90 CAPSULE | Refills: 3 | Status: SHIPPED | OUTPATIENT
Start: 2025-08-22

## 2025-08-22 RX ORDER — RAMELTEON 8 MG/1
8 TABLET ORAL NIGHTLY PRN
Qty: 90 TABLET | Refills: 0 | Status: SHIPPED | OUTPATIENT
Start: 2025-08-22 | End: 2025-11-20

## 2025-08-22 RX ORDER — PHYTONADIONE 5 MG/1
2.5 TABLET ORAL DAILY
Qty: 45 TABLET | Refills: 3 | Status: SHIPPED | OUTPATIENT
Start: 2025-08-22

## 2025-08-23 LAB
HAV IGM SER IA-ACNC: NONREACTIVE
HEP B S AGB SURF AG: NONREACTIVE
HEPATITIS B CORE IGM ANTIBODY: NONREACTIVE
HEPATITIS C ANTIBODY: NONREACTIVE

## 2025-08-23 ASSESSMENT — ENCOUNTER SYMPTOMS
COLOR CHANGE: 0
TROUBLE SWALLOWING: 0
VOMITING: 0
EYE PAIN: 0
WHEEZING: 0
CHEST TIGHTNESS: 0
ABDOMINAL DISTENTION: 0
NAUSEA: 0
PHOTOPHOBIA: 0
EYE REDNESS: 0
ABDOMINAL PAIN: 0
CONSTIPATION: 0
SHORTNESS OF BREATH: 0
BLOOD IN STOOL: 0
DIARRHEA: 0
RECTAL PAIN: 0
VOICE CHANGE: 0

## 2025-08-25 LAB
IGA SERPL-MCNC: 148 MG/DL (ref 68–408)
IGG SERPL-MCNC: 925 MG/DL (ref 768–1632)
IGM SERPL-MCNC: 79 MG/DL (ref 35–263)

## 2025-08-28 ENCOUNTER — ANCILLARY PROCEDURE (OUTPATIENT)
Dept: ENDOSCOPY | Age: 60
End: 2025-08-28
Payer: COMMERCIAL

## 2025-08-28 DIAGNOSIS — R79.89 ABNORMAL LFTS: ICD-10-CM

## 2025-08-28 PROCEDURE — 91200 LIVER ELASTOGRAPHY: CPT

## (undated) DEVICE — DRESSING GZ W1XL8IN COT XRFRM N ADH OVERWRAP CURAD

## (undated) DEVICE — BANDAGE GZ W2XL75IN ST RAYON POLY CNFRM STRTCH LTWT

## (undated) DEVICE — APPLICATOR MEDICATED 26 CC SOLUTION HI LT ORNG CHLORAPREP

## (undated) DEVICE — COVER LT HNDL BLU PLAS

## (undated) DEVICE — GLOVE ORANGE PI 7   MSG9070

## (undated) DEVICE — HAND II: Brand: MEDLINE INDUSTRIES, INC.

## (undated) DEVICE — 1010 S-DRAPE TOWEL DRAPE 10/BX: Brand: STERI-DRAPE™

## (undated) DEVICE — BANDAGE COMPR W2INXL5YD WHT BGE POLY COT M E WRP WV HK AND

## (undated) DEVICE — GOWN,AURORA,NONREINFORCED,LARGE: Brand: MEDLINE

## (undated) DEVICE — GLOVE ORANGE PI 7 1/2   MSG9075

## (undated) DEVICE — ZIMMER® STERILE DISPOSABLE TOURNIQUET CUFF, DUAL PORT, SINGLE BLADDER, 18 IN. (46 CM)

## (undated) DEVICE — PADDING UNDERCAST W4INXL12FT RAYON POLY SYN NONADHESIVE

## (undated) DEVICE — BANDAGE COMPR W2INXL5YD HI E BGE W/ CLP SURE-WRAP